# Patient Record
Sex: MALE | Race: WHITE | Employment: FULL TIME | URBAN - METROPOLITAN AREA
[De-identification: names, ages, dates, MRNs, and addresses within clinical notes are randomized per-mention and may not be internally consistent; named-entity substitution may affect disease eponyms.]

---

## 2019-01-15 ENCOUNTER — TELEPHONE (OUTPATIENT)
Dept: CARDIOLOGY CLINIC | Age: 59
End: 2019-01-15

## 2019-01-15 ENCOUNTER — OFFICE VISIT (OUTPATIENT)
Dept: CARDIOLOGY CLINIC | Age: 59
End: 2019-01-15
Payer: COMMERCIAL

## 2019-01-15 VITALS — OXYGEN SATURATION: 96 % | HEART RATE: 60 BPM | DIASTOLIC BLOOD PRESSURE: 74 MMHG | SYSTOLIC BLOOD PRESSURE: 112 MMHG

## 2019-01-15 DIAGNOSIS — R07.9 CHEST PAIN, UNSPECIFIED TYPE: Primary | ICD-10-CM

## 2019-01-15 DIAGNOSIS — R06.02 SHORTNESS OF BREATH: ICD-10-CM

## 2019-01-15 PROCEDURE — 93000 ELECTROCARDIOGRAM COMPLETE: CPT | Performed by: INTERNAL MEDICINE

## 2019-01-15 PROCEDURE — G8421 BMI NOT CALCULATED: HCPCS | Performed by: INTERNAL MEDICINE

## 2019-01-15 PROCEDURE — G8427 DOCREV CUR MEDS BY ELIG CLIN: HCPCS | Performed by: INTERNAL MEDICINE

## 2019-01-15 PROCEDURE — G8484 FLU IMMUNIZE NO ADMIN: HCPCS | Performed by: INTERNAL MEDICINE

## 2019-01-15 PROCEDURE — 3017F COLORECTAL CA SCREEN DOC REV: CPT | Performed by: INTERNAL MEDICINE

## 2019-01-15 PROCEDURE — 99245 OFF/OP CONSLTJ NEW/EST HI 55: CPT | Performed by: INTERNAL MEDICINE

## 2019-01-15 RX ORDER — ATORVASTATIN CALCIUM 40 MG/1
40 TABLET, FILM COATED ORAL DAILY
Qty: 90 TABLET | Refills: 3 | Status: SHIPPED | OUTPATIENT
Start: 2019-01-15 | End: 2019-02-11 | Stop reason: SDUPTHER

## 2019-01-15 RX ORDER — METOPROLOL SUCCINATE 25 MG/1
25 TABLET, EXTENDED RELEASE ORAL DAILY
Qty: 90 TABLET | Refills: 3 | Status: SHIPPED | OUTPATIENT
Start: 2019-01-15 | End: 2020-01-27

## 2019-01-15 RX ORDER — ASPIRIN 81 MG/1
81 TABLET ORAL DAILY
Qty: 90 TABLET | Refills: 3 | Status: SHIPPED | OUTPATIENT
Start: 2019-01-15 | End: 2020-01-27

## 2019-01-15 RX ORDER — ATORVASTATIN CALCIUM 20 MG/1
40 TABLET, FILM COATED ORAL DAILY
Qty: 90 TABLET | Refills: 3 | Status: ON HOLD | OUTPATIENT
Start: 2019-01-15 | End: 2019-01-17 | Stop reason: HOSPADM

## 2019-01-15 RX ORDER — ATORVASTATIN CALCIUM 20 MG/1
TABLET, FILM COATED ORAL
Refills: 1 | COMMUNITY
Start: 2018-12-10 | End: 2019-01-15 | Stop reason: SDUPTHER

## 2019-01-15 RX ORDER — GLIMEPIRIDE 4 MG/1
TABLET ORAL DAILY
Refills: 1 | COMMUNITY
Start: 2019-01-08

## 2019-01-15 RX ORDER — LISINOPRIL AND HYDROCHLOROTHIAZIDE 20; 12.5 MG/1; MG/1
TABLET ORAL
Refills: 1 | COMMUNITY
Start: 2019-01-08 | End: 2022-10-19 | Stop reason: ALTCHOICE

## 2019-01-15 RX ORDER — NITROGLYCERIN 0.4 MG/1
0.4 TABLET SUBLINGUAL EVERY 5 MIN PRN
Qty: 25 TABLET | Refills: 3 | Status: SHIPPED | OUTPATIENT
Start: 2019-01-15 | End: 2022-07-28

## 2019-01-17 ENCOUNTER — HOSPITAL ENCOUNTER (OUTPATIENT)
Dept: CARDIAC CATH/INVASIVE PROCEDURES | Age: 59
Discharge: HOME | End: 2019-01-17
Attending: INTERNAL MEDICINE | Admitting: INTERNAL MEDICINE
Payer: COMMERCIAL

## 2019-01-17 VITALS — WEIGHT: 220 LBS | HEIGHT: 72 IN | BODY MASS INDEX: 29.8 KG/M2

## 2019-01-17 DIAGNOSIS — R06.09 DYSPNEA ON EXERTION: ICD-10-CM

## 2019-01-17 DIAGNOSIS — R07.9 CHEST PAIN, UNSPECIFIED TYPE: Primary | ICD-10-CM

## 2019-01-17 DIAGNOSIS — E78.2 MIXED HYPERLIPIDEMIA: ICD-10-CM

## 2019-01-17 LAB
A/G RATIO: 1.7 (ref 1.1–2.2)
ALBUMIN SERPL-MCNC: 4.7 G/DL (ref 3.4–5)
ALP BLD-CCNC: 58 U/L (ref 40–129)
ALT SERPL-CCNC: 21 U/L (ref 10–40)
ANION GAP SERPL CALCULATED.3IONS-SCNC: 10 MMOL/L (ref 3–16)
AST SERPL-CCNC: 17 U/L (ref 15–37)
BILIRUB SERPL-MCNC: 0.6 MG/DL (ref 0–1)
BUN BLDV-MCNC: 21 MG/DL (ref 7–20)
CALCIUM SERPL-MCNC: 9.6 MG/DL (ref 8.3–10.6)
CHLORIDE BLD-SCNC: 100 MMOL/L (ref 99–110)
CHOLESTEROL, TOTAL: 119 MG/DL (ref 0–199)
CO2: 27 MMOL/L (ref 21–32)
CREAT SERPL-MCNC: 1 MG/DL (ref 0.9–1.3)
GFR AFRICAN AMERICAN: >60
GFR NON-AFRICAN AMERICAN: >60
GLOBULIN: 2.7 G/DL
GLUCOSE BLD-MCNC: 119 MG/DL (ref 70–99)
HCT VFR BLD CALC: 41.5 % (ref 40.5–52.5)
HDLC SERPL-MCNC: 31 MG/DL (ref 40–60)
HEMOGLOBIN: 13.7 G/DL (ref 13.5–17.5)
LDL CHOLESTEROL CALCULATED: 71 MG/DL
LEFT VENTRICULAR EJECTION FRACTION HIGH VALUE: 60 %
LEFT VENTRICULAR EJECTION FRACTION MODE: NORMAL
LV EF: 55 %
MCH RBC QN AUTO: 28.7 PG (ref 26–34)
MCHC RBC AUTO-ENTMCNC: 32.9 G/DL (ref 31–36)
MCV RBC AUTO: 87 FL (ref 80–100)
PDW BLD-RTO: 13.6 % (ref 12.4–15.4)
PLATELET # BLD: 212 K/UL (ref 135–450)
PMV BLD AUTO: 8.7 FL (ref 5–10.5)
POTASSIUM SERPL-SCNC: 4.4 MMOL/L (ref 3.5–5.1)
RBC # BLD: 4.77 M/UL (ref 4.2–5.9)
SODIUM BLD-SCNC: 137 MMOL/L (ref 136–145)
TOTAL PROTEIN: 7.4 G/DL (ref 6.4–8.2)
TRIGL SERPL-MCNC: 84 MG/DL (ref 0–150)
TSH REFLEX: 1.32 UIU/ML (ref 0.27–4.2)
VLDLC SERPL CALC-MCNC: 17 MG/DL
WBC # BLD: 5.3 K/UL (ref 4–11)

## 2019-01-17 PROCEDURE — 6360000004 HC RX CONTRAST MEDICATION: Performed by: INTERNAL MEDICINE

## 2019-01-17 PROCEDURE — 84443 ASSAY THYROID STIM HORMONE: CPT

## 2019-01-17 PROCEDURE — 80061 LIPID PANEL: CPT

## 2019-01-17 PROCEDURE — 93005 ELECTROCARDIOGRAM TRACING: CPT | Performed by: INTERNAL MEDICINE

## 2019-01-17 PROCEDURE — 93458 L HRT ARTERY/VENTRICLE ANGIO: CPT

## 2019-01-17 PROCEDURE — 2709999900 HC NON-CHARGEABLE SUPPLY

## 2019-01-17 PROCEDURE — C1894 INTRO/SHEATH, NON-LASER: HCPCS

## 2019-01-17 PROCEDURE — 99152 MOD SED SAME PHYS/QHP 5/>YRS: CPT

## 2019-01-17 PROCEDURE — C1760 CLOSURE DEV, VASC: HCPCS

## 2019-01-17 PROCEDURE — 99152 MOD SED SAME PHYS/QHP 5/>YRS: CPT | Performed by: INTERNAL MEDICINE

## 2019-01-17 PROCEDURE — 6360000002 HC RX W HCPCS

## 2019-01-17 PROCEDURE — 36415 COLL VENOUS BLD VENIPUNCTURE: CPT

## 2019-01-17 PROCEDURE — 80053 COMPREHEN METABOLIC PANEL: CPT

## 2019-01-17 PROCEDURE — 2500000003 HC RX 250 WO HCPCS

## 2019-01-17 PROCEDURE — 83036 HEMOGLOBIN GLYCOSYLATED A1C: CPT

## 2019-01-17 PROCEDURE — C1769 GUIDE WIRE: HCPCS

## 2019-01-17 PROCEDURE — 93458 L HRT ARTERY/VENTRICLE ANGIO: CPT | Performed by: INTERNAL MEDICINE

## 2019-01-17 PROCEDURE — 85027 COMPLETE CBC AUTOMATED: CPT

## 2019-01-17 PROCEDURE — 99024 POSTOP FOLLOW-UP VISIT: CPT | Performed by: INTERNAL MEDICINE

## 2019-01-17 PROCEDURE — 99153 MOD SED SAME PHYS/QHP EA: CPT

## 2019-01-17 RX ADMIN — IOPAMIDOL 88 ML: 755 INJECTION, SOLUTION INTRAVENOUS at 13:50

## 2019-01-18 LAB
EKG ATRIAL RATE: 72 BPM
EKG DIAGNOSIS: NORMAL
EKG P AXIS: 54 DEGREES
EKG P-R INTERVAL: 126 MS
EKG Q-T INTERVAL: 390 MS
EKG QRS DURATION: 96 MS
EKG QTC CALCULATION (BAZETT): 427 MS
EKG R AXIS: 81 DEGREES
EKG T AXIS: 10 DEGREES
EKG VENTRICULAR RATE: 72 BPM
ESTIMATED AVERAGE GLUCOSE: 197.3 MG/DL
HBA1C MFR BLD: 8.5 %

## 2019-02-05 ENCOUNTER — HOSPITAL ENCOUNTER (OUTPATIENT)
Dept: NON INVASIVE DIAGNOSTICS | Age: 59
Discharge: HOME | End: 2019-02-05
Payer: COMMERCIAL

## 2019-02-05 DIAGNOSIS — R06.09 DYSPNEA ON EXERTION: ICD-10-CM

## 2019-02-05 LAB
LEFT VENTRICULAR EJECTION FRACTION HIGH VALUE: 60 %
LEFT VENTRICULAR EJECTION FRACTION MODE: NORMAL
LV EF: 55 %
LV EF: 58 %
LVEF MODALITY: NORMAL

## 2019-02-05 PROCEDURE — 93306 TTE W/DOPPLER COMPLETE: CPT

## 2019-02-11 RX ORDER — ATORVASTATIN CALCIUM 40 MG/1
40 TABLET, FILM COATED ORAL DAILY
Qty: 90 TABLET | Refills: 3 | Status: SHIPPED | OUTPATIENT
Start: 2019-02-11 | End: 2022-07-28

## 2019-03-08 ENCOUNTER — HOSPITAL ENCOUNTER (OUTPATIENT)
Age: 59
Discharge: HOME | End: 2019-03-08
Payer: COMMERCIAL

## 2019-03-08 DIAGNOSIS — E78.2 MIXED HYPERLIPIDEMIA: ICD-10-CM

## 2019-03-08 PROBLEM — E78.5 HYPERLIPIDEMIA: Status: ACTIVE | Noted: 2019-03-08

## 2019-03-08 PROBLEM — Z82.49 FAMILY HISTORY OF CARDIOVASCULAR DISEASE: Status: ACTIVE | Noted: 2019-03-08

## 2019-03-08 LAB
ALBUMIN SERPL-MCNC: 4.6 G/DL (ref 3.4–5)
ALP BLD-CCNC: 59 U/L (ref 40–129)
ALT SERPL-CCNC: 24 U/L (ref 10–40)
AST SERPL-CCNC: 20 U/L (ref 15–37)
BILIRUB SERPL-MCNC: 0.4 MG/DL (ref 0–1)
BILIRUBIN DIRECT: <0.2 MG/DL (ref 0–0.3)
BILIRUBIN, INDIRECT: NORMAL MG/DL (ref 0–1)
CHOLESTEROL, TOTAL: 117 MG/DL (ref 0–199)
HDLC SERPL-MCNC: 34 MG/DL (ref 40–60)
LDL CHOLESTEROL CALCULATED: 67 MG/DL
TOTAL PROTEIN: 7.3 G/DL (ref 6.4–8.2)
TRIGL SERPL-MCNC: 80 MG/DL (ref 0–150)
VLDLC SERPL CALC-MCNC: 16 MG/DL

## 2019-03-08 PROCEDURE — 36415 COLL VENOUS BLD VENIPUNCTURE: CPT

## 2019-03-08 PROCEDURE — 80076 HEPATIC FUNCTION PANEL: CPT

## 2019-03-08 PROCEDURE — 80061 LIPID PANEL: CPT

## 2019-03-12 ENCOUNTER — OFFICE VISIT (OUTPATIENT)
Dept: CARDIOLOGY CLINIC | Age: 59
End: 2019-03-12
Payer: COMMERCIAL

## 2019-03-12 VITALS
WEIGHT: 223.4 LBS | BODY MASS INDEX: 30.26 KG/M2 | OXYGEN SATURATION: 99 % | HEART RATE: 62 BPM | SYSTOLIC BLOOD PRESSURE: 116 MMHG | DIASTOLIC BLOOD PRESSURE: 72 MMHG | HEIGHT: 72 IN

## 2019-03-12 DIAGNOSIS — Z82.49 FAMILY HISTORY OF CARDIOVASCULAR DISEASE: ICD-10-CM

## 2019-03-12 DIAGNOSIS — E78.5 HYPERLIPIDEMIA, UNSPECIFIED HYPERLIPIDEMIA TYPE: Primary | ICD-10-CM

## 2019-03-12 PROCEDURE — G8417 CALC BMI ABV UP PARAM F/U: HCPCS | Performed by: INTERNAL MEDICINE

## 2019-03-12 PROCEDURE — G8484 FLU IMMUNIZE NO ADMIN: HCPCS | Performed by: INTERNAL MEDICINE

## 2019-03-12 PROCEDURE — G8427 DOCREV CUR MEDS BY ELIG CLIN: HCPCS | Performed by: INTERNAL MEDICINE

## 2019-03-12 PROCEDURE — 99214 OFFICE O/P EST MOD 30 MIN: CPT | Performed by: INTERNAL MEDICINE

## 2019-03-12 PROCEDURE — 4004F PT TOBACCO SCREEN RCVD TLK: CPT | Performed by: INTERNAL MEDICINE

## 2019-03-12 PROCEDURE — 3017F COLORECTAL CA SCREEN DOC REV: CPT | Performed by: INTERNAL MEDICINE

## 2019-03-12 PROCEDURE — G8598 ASA/ANTIPLAT THER USED: HCPCS | Performed by: INTERNAL MEDICINE

## 2020-01-27 RX ORDER — ASPIRIN 81 MG/1
TABLET, DELAYED RELEASE ORAL
Qty: 30 TABLET | Refills: 2 | Status: SHIPPED | OUTPATIENT
Start: 2020-01-27 | End: 2020-01-30

## 2020-01-27 RX ORDER — METOPROLOL SUCCINATE 25 MG/1
25 TABLET, EXTENDED RELEASE ORAL DAILY
Qty: 30 TABLET | Refills: 2 | Status: SHIPPED | OUTPATIENT
Start: 2020-01-27 | End: 2020-01-30

## 2020-01-27 NOTE — TELEPHONE ENCOUNTER
RX APPROVAL:      Refill:   Requested Prescriptions     Pending Prescriptions Disp Refills    metoprolol succinate (TOPROL XL) 25 MG extended release tablet [Pharmacy Med Name: Metoprolol Succinate ER Oral Tablet Extended Release 24 Hour 25 MG] 30 tablet 2     Sig: TAKE 1 TABLET BY MOUTH DAILY    SM ASPIRIN ADULT LOW STRENGTH 81 MG EC tablet [Pharmacy Med Name: SM Aspirin Adult Low Strength Oral Tablet Delayed Release 81 MG] 30 tablet 2     Sig: TAKE 1 TABLET BY MOUTH ONE TIME A DAY      Last OV: 3/12/2019 next ov 4/13/20  Last EKG:   Last Labs:   Lab Results   Component Value Date    GLUCOSE 119 01/17/2019    BUN 21 01/17/2019    CREATININE 1.0 01/17/2019    LABGLOM >60 01/17/2019     01/17/2019    K 4.4 01/17/2019     01/17/2019    CO2 27 01/17/2019    CALCIUM 9.6 01/17/2019     Lab Results   Component Value Date     01/17/2019     01/17/2019    CO2 27 01/17/2019    ANIONGAP 10 01/17/2019    GLUCOSE 119 01/17/2019    BUN 21 01/17/2019    CREATININE 1.0 01/17/2019    LABGLOM >60 01/17/2019    GFRAA >60 01/17/2019    CALCIUM 9.6 01/17/2019    PROT 7.3 03/08/2019    LABALBU 4.6 03/08/2019    BILITOT 0.4 03/08/2019    ALKPHOS 59 03/08/2019    AST 20 03/08/2019    ALT 24 03/08/2019    GLOB 2.7 01/17/2019     Lab Results   Component Value Date    ALT 24 03/08/2019    AST 20 03/08/2019     Lab Results   Component Value Date    K 4.4 01/17/2019       Plan and labs reviewed

## 2020-01-30 RX ORDER — ASPIRIN 81 MG/1
TABLET, DELAYED RELEASE ORAL
Qty: 30 TABLET | Refills: 2 | Status: SHIPPED | OUTPATIENT
Start: 2020-01-30 | End: 2020-08-24

## 2020-01-30 RX ORDER — METOPROLOL SUCCINATE 25 MG/1
25 TABLET, EXTENDED RELEASE ORAL DAILY
Qty: 30 TABLET | Refills: 2 | Status: SHIPPED | OUTPATIENT
Start: 2020-01-30 | End: 2020-07-27

## 2020-04-03 ENCOUNTER — TELEPHONE (OUTPATIENT)
Dept: CARDIOLOGY CLINIC | Age: 60
End: 2020-04-03

## 2020-04-03 NOTE — TELEPHONE ENCOUNTER
Called Judi Cruz to discuss appointment with [facility] 4/13/20. Offered to change to virtual visit, but he would prefer to cancel for now and call back to reschedule after COVID precaution recommendations lifted. Denies cardiac symptoms. In fact he states \"he feels great. \" Appointment cancelled.

## 2020-07-27 ENCOUNTER — TELEPHONE (OUTPATIENT)
Dept: CARDIOLOGY CLINIC | Age: 60
End: 2020-07-27

## 2020-07-27 NOTE — TELEPHONE ENCOUNTER
Due for yearly follow up with [facility], please call and schedule next available.

## 2020-08-24 RX ORDER — ASPIRIN 81 MG/1
TABLET, DELAYED RELEASE ORAL
Qty: 30 TABLET | Refills: 0 | Status: SHIPPED | OUTPATIENT
Start: 2020-08-24 | End: 2020-09-28

## 2020-09-28 RX ORDER — ASPIRIN 81 MG/1
TABLET, DELAYED RELEASE ORAL
Qty: 30 TABLET | Refills: 0 | Status: SHIPPED | OUTPATIENT
Start: 2020-09-28 | End: 2020-11-25

## 2020-11-23 RX ORDER — ASPIRIN 81 MG/1
TABLET, DELAYED RELEASE ORAL
Qty: 30 TABLET | Refills: 0 | OUTPATIENT
Start: 2020-11-23

## 2020-11-25 RX ORDER — ASPIRIN 81 MG/1
TABLET, DELAYED RELEASE ORAL
Qty: 30 TABLET | Refills: 0 | Status: SHIPPED | OUTPATIENT
Start: 2020-11-25 | End: 2022-07-28

## 2020-11-25 NOTE — TELEPHONE ENCOUNTER
Lov 3/12/2019  Labs 3/8/2019  Lrf  9/28/2020 Disp 30+0  Appt No appt scheduled please advise thanks.

## 2021-02-23 DIAGNOSIS — R07.9 CHEST PAIN, UNSPECIFIED TYPE: ICD-10-CM

## 2021-02-23 DIAGNOSIS — R06.02 SHORTNESS OF BREATH: ICD-10-CM

## 2021-02-23 RX ORDER — ASPIRIN 81 MG/1
TABLET, DELAYED RELEASE ORAL
Qty: 30 TABLET | Refills: 0 | OUTPATIENT
Start: 2021-02-23

## 2021-07-23 DIAGNOSIS — R06.02 SHORTNESS OF BREATH: ICD-10-CM

## 2021-07-23 DIAGNOSIS — R07.9 CHEST PAIN, UNSPECIFIED TYPE: ICD-10-CM

## 2021-07-23 RX ORDER — METOPROLOL SUCCINATE 25 MG/1
TABLET, EXTENDED RELEASE ORAL
Qty: 90 TABLET | Refills: 0 | Status: SHIPPED | OUTPATIENT
Start: 2021-07-23 | End: 2021-10-25

## 2021-07-23 NOTE — TELEPHONE ENCOUNTER
Received refill request for Metoprolol succinate from [facility].      Last ov:2019 BNN    Last EK2019    Last Refill:2020 #90 tabs w/ 3 refills    Next appointment:no future appointment last six months

## 2021-10-23 DIAGNOSIS — R06.02 SHORTNESS OF BREATH: ICD-10-CM

## 2021-10-23 DIAGNOSIS — R07.9 CHEST PAIN, UNSPECIFIED TYPE: ICD-10-CM

## 2021-10-25 RX ORDER — METOPROLOL SUCCINATE 25 MG/1
TABLET, EXTENDED RELEASE ORAL
Qty: 90 TABLET | Refills: 0 | Status: SHIPPED | OUTPATIENT
Start: 2021-10-25 | End: 2022-07-28

## 2021-10-25 NOTE — TELEPHONE ENCOUNTER
Received refill request for Metoprolol succinate from [facility].     Last ov: 2019 BNN    Last EK2019    Last Refill: 2021 #90 w/ 0 refills    Next appointment: no future OV

## 2022-01-23 DIAGNOSIS — R06.02 SHORTNESS OF BREATH: ICD-10-CM

## 2022-01-23 DIAGNOSIS — R07.9 CHEST PAIN, UNSPECIFIED TYPE: ICD-10-CM

## 2022-01-24 RX ORDER — METOPROLOL SUCCINATE 25 MG/1
TABLET, EXTENDED RELEASE ORAL
Qty: 90 TABLET | Refills: 0 | OUTPATIENT
Start: 2022-01-24

## 2022-01-24 NOTE — TELEPHONE ENCOUNTER
Received refill request for Metoprolol from [facility]. Last OV: 03/12/2019 w/ BNN     Last Refill: 10/25/2021 #90 w/ 0 refills     Next Appointment: none at this time     Spoke with the patient and advised him that he needs an appt as we have not seen him since 2019. Patient states he doesn't know if he needs to take it anymore but he is going to f/u with his PCP .      Refusing rx at this time

## 2022-02-22 DIAGNOSIS — R07.9 CHEST PAIN, UNSPECIFIED TYPE: ICD-10-CM

## 2022-02-22 DIAGNOSIS — R06.02 SHORTNESS OF BREATH: ICD-10-CM

## 2022-02-22 RX ORDER — METOPROLOL SUCCINATE 25 MG/1
TABLET, EXTENDED RELEASE ORAL
Qty: 90 TABLET | Refills: 0 | OUTPATIENT
Start: 2022-02-22

## 2022-02-22 NOTE — TELEPHONE ENCOUNTER
I spoke with pt and told him, he needs an appointment or PCP can fill his med. He stated  He will contact PCP.     Refill refused

## 2022-07-26 PROBLEM — E11.59 HYPERTENSION ASSOCIATED WITH TYPE 2 DIABETES MELLITUS (HCC): Status: ACTIVE | Noted: 2022-07-26

## 2022-07-26 PROBLEM — I15.2 HYPERTENSION ASSOCIATED WITH TYPE 2 DIABETES MELLITUS (HCC): Status: ACTIVE | Noted: 2022-07-26

## 2022-07-26 PROBLEM — E11.69 TYPE 2 DIABETES MELLITUS WITH OBESITY (HCC): Status: ACTIVE | Noted: 2022-07-26

## 2022-07-26 PROBLEM — E66.9 TYPE 2 DIABETES MELLITUS WITH OBESITY (HCC): Status: ACTIVE | Noted: 2022-07-26

## 2022-07-26 PROBLEM — E11.59 CORONARY ARTERY DISEASE DUE TO TYPE 2 DIABETES MELLITUS (HCC): Status: ACTIVE | Noted: 2022-07-26

## 2022-07-26 PROBLEM — I25.10 CORONARY ARTERY DISEASE DUE TO TYPE 2 DIABETES MELLITUS (HCC): Status: ACTIVE | Noted: 2022-07-26

## 2022-07-26 PROBLEM — Z79.899 MEDICAL CANNABIS USE: Status: ACTIVE | Noted: 2022-07-26

## 2022-07-27 NOTE — PROGRESS NOTES
Coretta Lloyd   58 y.o. male   1960    This is patient's first visit with me. Coretta Lloyd is here to establish care as their new PCP. Coretta Lloyd has a PMH significant for:    Patient Active Problem List   Diagnosis    Chest pain    Shortness of breath    Unstable angina (HCC)    Family history of cardiovascular disease    Hyperlipidemia    Hypertension associated with type 2 diabetes mellitus ([facility] [address]    Coronary artery disease due to type 2 diabetes mellitus ([facility] [address]    Medical cannabis use       Reason(s) for visit:   Chief Complaint   Patient presents with    Established New Doctor       HPI:    Office visit encounter:    CAD:  -Echo Feb 2019 - EF 55-60%. Normal LV. -LHC - Jan 2019 - LAD 25-30%. -Patient stated he quit taking metoprolol because he didn't like taking meds. HTN:  BP Readings from Last 3 Encounters:   07/28/22 130/72   03/12/19 116/72   01/15/19 112/74     -Patient has not been checking BP readings regularly.   -Patient denied issues with frequent headache, chest pain, shortness of breath, palpitations, malaise, etc.  -He did note having issues of fatigue and diaphoresis easily. Type II diabetes mellitus:  -Last A1c =  6.0-6.9 - earlier this year    Lab Results   Component Value Date    LABA1C 8.5 01/17/2019     Lab Results   Component Value Date    LABMICR Not Indicated 05/26/2015    LDLCALC 67 03/08/2019    CREATININE 1.0 01/17/2019     -Glucose readings (on average): usually checks before dinner - 110-160  -Neuropathy symptoms: [] Yes [x] No  -Gastroparesis symptoms: [] Yes [x] No   -Visual disturbances: [x] Yes [] No  -Eye exam:   -Last one - [facility] 2020 - normal  [x] Wears glasses/contact lenses   -Medication adherence: [x] Yes [] No  -Other comments:     Colon cancer screening:    Other:  -Arthrits issues - repair of left hip, right knee. Will have rt hip surgery in Aug 2022. 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     PDMP monitoring:  -PDMP report was remarkable for:  [] Narcotics:  [] Benzodiazepines:  [] Stimulants:  [] Sedatives:  [] Neuropathic meds:  [x] Medical cannabis: monthly rx since as early as April 2022. [] Other:  -Last report:   Last PDMP [place] as Reviewed [facility]):  Review User Review Instant Review Result   [address] Willams, JOAQUÍN 7/26/2022 11:23 PM Reviewed PDMP [1]       No Known Allergies    Current Outpatient Medications on File Prior to Visit   Medication Sig Dispense Refill    atorvastatin (LIPITOR) 20 MG tablet TAKE 1 TABLET BY MOUTH AT BEDTIME      metFORMIN (GLUCOPHAGE) 1000 MG tablet Take 1,000 mg by mouth daily (with breakfast)  1    glimepiride (AMARYL) 4 MG tablet daily  1    lisinopril-hydrochlorothiazide (PRINZIDE;ZESTORETIC) 20-12.5 MG per tablet TAKE 1 TABLET BY MOUTH ONE TIME A DAY  1     No current facility-administered medications on file prior to visit.         Family History   Problem Relation Age of Onset    Cancer Mother     Diabetes Father     Kidney Disease Father     Heart Attack Father     Heart Attack Brother        Social History     Tobacco Use    Smoking status: Never    Smokeless tobacco: Current     Types: Snuff   Substance Use Topics    Alcohol use: Yes        Lab Results   Component Value Date    WBC 5.3 01/17/2019    HGB 13.7 01/17/2019    HCT 41.5 01/17/2019    MCV 87.0 01/17/2019     01/17/2019         Chemistry        Component Value Date/Time     01/17/2019 1140    K 4.4 01/17/2019 1140     01/17/2019 1140    CO2 27 01/17/2019 1140    BUN 21 (H) 01/17/2019 1140    CREATININE 1.0 01/17/2019 1140        Component Value Date/Time    CALCIUM 9.6 01/17/2019 1140    ALKPHOS 59 03/08/2019 0750    AST 20 03/08/2019 0750    ALT 24 03/08/2019 0750    BILITOT 0.4 03/08/2019 0750          Lab Results   Component Value Date    ALT 24 03/08/2019    AST 20 03/08/2019    ALKPHOS 59 03/08/2019    BILITOT 0.4 03/08/2019       Review of Systems   Constitutional:  Positive for activity change. Negative for appetite change, fatigue, fever and unexpected weight change. HENT:  Negative for congestion, rhinorrhea, sinus pressure and trouble swallowing. Respiratory:  Negative for cough, chest tightness, shortness of breath and wheezing. Cardiovascular:  Negative for chest pain, palpitations and leg swelling. Gastrointestinal:  Negative for abdominal distention, abdominal pain, blood in stool, constipation, diarrhea, nausea and vomiting. Genitourinary:  Negative for dysuria, frequency and hematuria. Musculoskeletal:  Positive for arthralgias. Negative for back pain. Skin:  Negative for rash. Neurological:  Negative for dizziness, weakness, light-headedness, numbness and headaches. Wt Readings from Last 3 Encounters:   07/28/22 206 lb 11.2 oz (93.8 kg)   03/12/19 223 lb 6.4 oz (101.3 kg)   01/17/19 220 lb (99.8 kg)       BP Readings from Last 3 Encounters:   07/28/22 130/72   03/12/19 116/72   01/15/19 112/74       Pulse Readings from Last 3 Encounters:   07/28/22 81   03/12/19 62   01/15/19 60       /72   Pulse 81   Temp 97.4 °F (36.3 °C)   Ht 6' (1.829 m)   Wt 206 lb 11.2 oz (93.8 kg)   SpO2 97%   BMI 28.03 kg/m²      Physical Exam  Vitals reviewed. Constitutional:       General: He is awake. He is not in acute distress. Appearance: He is overweight. He is not ill-appearing or diaphoretic. HENT:      Head: Normocephalic and atraumatic. No abrasion or masses. Hair is normal.      Right Ear: External ear normal.      Left Ear: External ear normal.      Nose: Nose normal.   Eyes:      General: Lids are normal. Gaze aligned appropriately. No scleral icterus. Right eye: No discharge. Left eye: No discharge. Extraocular Movements: Extraocular movements intact. Conjunctiva/sclera: Conjunctivae normal.   Neck:      Trachea: Phonation normal.   Cardiovascular:      Rate and Rhythm: Normal rate and regular rhythm.    Pulmonary:      Effort: Pulmonary effort is normal. No respiratory distress. Breath sounds: No wheezing, rhonchi or rales. Abdominal:      General: Abdomen is flat. There is no distension. Palpations: Abdomen is soft. Musculoskeletal:         General: No deformity. Normal range of motion. Cervical back: Normal range of motion. No erythema. Right lower leg: No edema. Left lower leg: No edema. Skin:     Coloration: Skin is not cyanotic, jaundiced or pale. Findings: No abrasion, abscess, bruising, ecchymosis, erythema, signs of injury, laceration, lesion, petechiae, rash or wound. Neurological:      General: No focal deficit present. Mental Status: He is alert. Mental status is at baseline. GCS: GCS eye subscore is 4. GCS verbal subscore is 5. GCS motor subscore is 6. Cranial Nerves: No cranial nerve deficit, dysarthria or facial asymmetry. Motor: No weakness, tremor, atrophy or seizure activity. Coordination: Coordination normal.      Gait: Gait is intact. Psychiatric:         Attention and Perception: Attention and perception normal.         Mood and Affect: Mood and affect normal.         Speech: Speech normal.         Behavior: Behavior normal. Behavior is cooperative. Thought Content: Thought content normal.     -DM foot exam-  Visual inspection:  -Deformity/amputation: absent  -Skin lesions/pre-ulcerative calluses: absent  -Nails: hypertrophy - none; onychomycosis - none  -Edema: right- negative, left- negative    Sensory exam:  -Monofilament sensation: normal  (minimum of 5 random plantar locations tested, avoiding callused areas - > 1 area with absence of sensation is + for neuropathy)    Other:  -Pulses: 2+ dorsalis pedis, 2+ posterior tibialis - bilaterally  -Proprioception: Intact  -Skin temperature: Warm to touch. Assessment/Plan:   Delores Mcgregor was seen today for established new doctor.     Diagnoses and all orders for this visit:    Encounter to establish care with new doctor  Comments: Will continue current medical regiment and may change his meds depending on his lab work. Coronary artery disease due to type 2 diabetes mellitus (HCC)  -     C-REACTIVE PROTEIN HIGH SENSITIVITY; Future    Hypertension associated with type 2 diabetes mellitus (HCC)  -     Microalbumin / Creatinine Urine Ratio; Future  -     Brain Natriuretic Peptide; Future  -     HM DIABETES FOOT EXAM  -     Hemoglobin A1C; Future    Chronic fatigue  -     C-REACTIVE PROTEIN HIGH SENSITIVITY; Future  -     Testosterone; Future  -     Ferritin; Future  -     Iron and TIBC; Future  -     Vitamin B12 & Folate; Future  -     Vitamin D 25 Hydroxy; Future  -     TSH; Future  -     T4, Free; Future  -     Magnesium; Future  -     Sedimentation Rate; Future  -     Microalbumin / Creatinine Urine Ratio; Future  -     Brain Natriuretic Peptide; Future    Medical cannabis use     I reviewed the plan of care with the patient. Patient acknowledged understanding and agreed with plan of care overall. Medications Discontinued During This Encounter   Medication Reason    atorvastatin (LIPITOR) 40 MG tablet LIST CLEANUP    nitroGLYCERIN (NITROSTAT) 0.4 MG SL tablet LIST CLEANUP    metoprolol succinate (TOPROL XL) 25 MG extended release tablet LIST CLEANUP    SM ASPIRIN ADULT LOW STRENGTH 81 MG EC tablet LIST CLEANUP        General information on medications:  -When it comes to medications, whether with starting or adding a new medication or increasing the dose of a current medication, the benefits and risks have to always be considered and weighed over, especially if one is taking other medications as well.    -There are no medications that have no side effects and that there is always a risk involved with taking a medication.    -If a side effect were to occur with starting a new medication or with increasing the dose of a current medication that either the medication can be totally discontinued altogether or simply decrease the dose of it and if this would be the case a follow-up appointment would be deemed necessary.    -The drug allergy list will then be updated with the corresponding side effect(s) if it's deemed to be a true 'drug allergy'. -The most common adverse effects of medication(s) were addressed at today's visit.    -Lastly, the coverage status of a medication may vary from insurance to insurance and the only way to verify if the medication is covered is to send an actual prescription in.    -The drug formulary of each insurance changes without any warning or notification to the healthcare provider let alone the pharmacy.  -The cost of medications vary from insurance to insurance and the cost is always subject to change just like the drug formulary. Follow-up: No follow-ups on file. .     Patient was informed that if his or her symptoms worsen to follow up with me sooner or go to the nearest ER if the symptoms are very significant and warrant higher level of care. Regarding my note:  -This note was composed (by me only and not with assistance via a scribe) to the best of my knowledge and recollection of the encounter with the patient using one of my own customized note templates utilizing a combination of typing and dictating with the [address] [place] [id number] speech recognition software. As a result, the note may possibly contain various errors (e.g. spelling, grammar, and non-sensible words/phrases/statements) despite reviewing the note prior to signing it for completion. Time spent includes some or all of the following, both face-to-face time and non face-to-face time, but is not limited to:  [x] Preparing to see the patient by reviewing medical records available (notes, labs, imaging, etc.) prior to seeing the patient. [x] Obtaining and/or reviewing the history from the patient. [x] Performing a medically appropriate examination.   [x] Ordering of relevant lab work, medications, referrals, or procedures. [x] Discussing patient's medical issues and formulating an assessment and plan. [x] Reviewing plan of care with patient. Answering any questions or concerns. [x] Documentation within the electronic health record (EHR)  [] Reviewing records of history relevant to patient's issues after seeing the patient. [] Discussion or coordination of care with other health care professionals  [x] Other: length of office visit:  minutes    Shadi Bonner M.D.   [address] [place] [address]    Electronically signed by Tiesha Douglas MD on 7/28/2022 at 3:15 PM.

## 2022-07-27 NOTE — PROGRESS NOTES
-Preoperative Consultation-    Patient name: Clint Veliz    YOB: 1960    Date of Service: 7/29/2022    Chief Complaint   Patient presents with    Pre-op Exam     8/15 [facility]     This patient presents to the office today for a preoperative consultation    Surgery type: total right hip arthroplasty  Surgeon: Dr. Bharat Leonardo   Date of operation: August 15, 2022. Location: [facility]. History: Patient stated that he has had about 6 month history of right hip pain. He has a history of total left hip arthroplasty. He also has had a total right knee replacement. Conservative therapy includes: analgesics and self PT - no relief.     Planned anesthesia: to be determined by CRNA/anesthesiologist   Known anesthesia problems: None   Bleeding risk: No recent or remote history of abnormal bleeding  Personal or FH of DVT/PE: No    Patient objection to receiving blood products: No    Patient Active Problem List   Diagnosis    Chest pain    Shortness of breath    Unstable angina (HCC)    Family history of cardiovascular disease    Hyperlipidemia    Hypertension associated with type 2 diabetes mellitus ([facility]    Coronary artery disease due to type 2 diabetes mellitus ([facility]    Medical cannabis use     Past Surgical History:   Procedure Laterality Date    JOINT REPLACEMENT Right     knee    JOINT REPLACEMENT Left     HIP     Family History   Problem Relation Age of Onset    Cancer Mother     Diabetes Father     Kidney Disease Father     Heart Attack Father     Heart Attack Brother      No Known Allergies  Current Outpatient Medications   Medication Sig Dispense Refill    atorvastatin (LIPITOR) 20 MG tablet TAKE 1 TABLET BY MOUTH AT BEDTIME      metFORMIN (GLUCOPHAGE) 1000 MG tablet Take 1,000 mg by mouth daily (with breakfast)  1    glimepiride (AMARYL) 4 MG tablet daily  1    lisinopril-hydrochlorothiazide (PRINZIDE;ZESTORETIC) 20-12.5 MG per tablet TAKE 1 TABLET BY MOUTH ONE TIME A DAY  1     No current facility-administered medications for this visit. Social History     Socioeconomic History    Marital status:      Spouse name: Not on file    Number of children: Not on file    Years of education: Not on file    Highest education level: Not on file   Occupational History    Not on file   Tobacco Use    Smoking status: Never    Smokeless tobacco: Current     Types: Snuff   Vaping Use    Vaping Use: Never used   Substance and Sexual Activity    Alcohol use: Yes    Drug use: Never    Sexual activity: Not on file   Other Topics Concern    Not on file   Social History Narrative    Not on file     Social Determinants of Health     Financial Resource Strain: Not on file   Food Insecurity: Not on file   Transportation Needs: Not on file   Physical Activity: Not on file   Stress: Not on file   Social Connections: Not on file   Intimate Partner Violence: Not on file   Housing Stability: Not on file     Review of Systems:  A comprehensive review of systems was negative except for what was noted in the HPI. Physical Exam:   Wt Readings from Last 3 Encounters:   07/29/22 210 lb (95.3 kg)   07/28/22 206 lb 11.2 oz (93.8 kg)   03/12/19 223 lb 6.4 oz (101.3 kg)     Temp Readings from Last 3 Encounters:   07/29/22 97.2 °F (36.2 °C)   07/28/22 97.4 °F (36.3 °C)     BP Readings from Last 3 Encounters:   07/29/22 136/64   07/28/22 130/72   03/12/19 116/72     Pulse Readings from Last 3 Encounters:   07/29/22 (!) 42   07/28/22 81   03/12/19 62        Constitutional: He is oriented to person, place, and time. He appears well-developed and well-nourished. No distress. Head: Normocephalic and atraumatic. Mouth/Throat: Uvula is midline, oropharynx is clear and moist and mucous membranes are normal.   Eyes: Conjunctivae and EOM are normal. Pupils are equal, round, and reactive to light. Neck: Trachea normal and normal range of motion. Neck supple. No JVD present. Carotid bruit is not present.  No mass and no thyromegaly present. Cardiovascular: Normal rate, regular rhythm, normal heart sounds and intact distal pulses. Exam reveals no gallop and no friction rub. No murmur heard. Pulmonary/Chest: Effort normal and breath sounds normal. No respiratory distress. He has no wheezes. He has no rales. Abdominal: Soft. Normal aorta and bowel sounds are normal. He exhibits no distension and no mass. There is no hepatosplenomegaly. No tenderness. Musculoskeletal: He exhibits no edema and no tenderness. Neurological: He is alert and oriented to person, place, and time. He has normal strength. No cranial nerve deficit or sensory deficit. Coordination and gait normal.   Skin: Skin is warm and dry. No rash noted. No erythema. Psychiatric: He has a normal mood and affect. His behavior is normal.    EKG Interpretation:  frequent PVC's noted, unchanged from previous tracings. No EKG's on file for comparison. Lab Review:   Lab Results   Component Value Date/Time     01/17/2019 11:40 AM    K 4.4 01/17/2019 11:40 AM    CO2 27 01/17/2019 11:40 AM    BUN 21 01/17/2019 11:40 AM    CREATININE 1.0 01/17/2019 11:40 AM    GLUCOSE 119 01/17/2019 11:40 AM    CALCIUM 9.6 01/17/2019 11:40 AM     Lab Results   Component Value Date/Time    WBC 5.3 01/17/2019 11:40 AM    HGB 13.7 01/17/2019 11:40 AM    HCT 41.5 01/17/2019 11:40 AM    MCV 87.0 01/17/2019 11:40 AM     01/17/2019 11:40 AM         Assessment:   Maricruz Ramos is a 58 y.o. patient with planned surgery as noted above.       Known risk factors for perioperative complications: Coronary artery disease, Hypertension    Current medications which may produce withdrawal symptoms if withheld perioperatively: none     Pre-Operative Risk assessment using 2014 ACC/AHA guidelines:   -Emergent procedure: NO  -Active Cardiac Condition: NO (decompensated HF, Arrhythmia, MI <3 weeks, severe valve disease)  -Risk Level of Procedure: Intermediate Risk (intraperitoneal, intrathoracic, HENT, orthopedic, or carotid endarterectomy, etc.)  -Revised Cardiac Risk Index Risk factors: None  -Exercise Tolerance before Surgery: good        HAS-BLED Score                            Risk Factors      Points   Hypertension  Uncontrolled, >555 mmHg systolic   No=0   Renal disease  Dialysis, transplant, Cr>2.26 mg/dL or >200 µmol/L   No=0   Liver disease   Cirrhosis or bilirubin >2x normal with AST/ALT/AP >3x normal   No=0   Stroke history   No=0   Prior history of major bleeding or predisposition to bleeding     No=0   Labile INR  Unstable/high INRs, time in therapeutic range <60%   No=0   Age >71   No=0   Medication usage predisposing to bleeding   Aspirin, Clopidogrel, Prasugrel, Ticagrelor, NSAIDs, warfarin, DOACs   No=0   Alcohol use   >7 drinks/week   No=0     HAS-BLED score:    0:  Risk was 0.9% in one validation study and 1.13 bleeds per 100 patient-years in another validation study. ICD-10-CM    1. Preoperative examination  Z01.[address]  [address]      2. Essential hypertension  [address]      3. Abnormal EKG  R94.31 [facility]      4. Coronary artery disease due to type 2 diabetes mellitus [facility])  E11.59 [facility]    I25.10            Plan:   1. Preoperative workup as follows: hemoglobin, hematocrit, electrolytes, creatinine, glucose, liver function studies, COVID-19 testing (3-5 days prior to procedure - depending on surgical site recommendation). 2. Change in medication regiment before surgery: none  -On anti-platelet drug(s): none.   -On anticoagulant drug: none.   -On SGLT-2 inhibitor drug: none. There are no discontinued medications. 3. Prophylaxis for cardiac events with perioperative beta-blockers: none;  Not indicated  ACC/AHA indications for pre-operative beta-blocker use:    Vascular surgery with history of positive stress test  Intermediate or high risk surgery with history of CAD   Intermediate or high risk surgery with multiple clinical predictors of CAD- 2 of the following: history of compensated or prior heart failure, history of cerebrovascular disease, DM, or renal insufficiency  Routine administration of higher-dose, long-acting metoprolol in beta-blocker-naïve patients on the day of surgery, and in the absence of dose titration is associated with an overall increase in mortality. Beta-blockers should be started days to weeks prior to surgery and titrated to pulse < 70.    4. Deep vein thrombosis prophylaxis:   -Pharmacologic prophylaxis:  [] Eliquis - 2.5 mg twice daily for: [] at least 14 days or [] at least 35 days - to be started at least 12 hours post-op  [] Xarelto - 10 mg once daily for: [] at least 14 days or [] at least 35 days - to be started at least 12 hours post-op  [] Warfarin -   [] Other:   [x] None  -Counseled patient on ambulating as much as possible and minimizing sedentary time as well as discussed about moving lower extremities around while lying down to increase circulation. 5. Fall/safety precautions discussed (if applicable)    6. Antibiotic prophylaxis: none    7. Smoking cessation and education provided (if applicable)     8. Further recommendations from consultants: cardiology    9. Scanned pre-op form to media section and faxed to number listed on form (if applicable)    Pre-op assessment: given that patient has known CAD, HTN, and significant change in his EKG compared to his last one, I recommended referral to cardiology for evaluation. Other: I discussed with patient about avoiding NSAIDS at least 7 days prior to procedure and afterwards. Counseled patient to only use Tylenol for pain and if pain persists to schedule an appointment with me. [address]    Electronically signed by Kate Collins MD on 7/29/2022 at 10:02 AM.

## 2022-07-28 ENCOUNTER — OFFICE VISIT (OUTPATIENT)
Dept: FAMILY MEDICINE CLINIC | Age: 62
End: 2022-07-28
Payer: COMMERCIAL

## 2022-07-28 VITALS
HEIGHT: 72 IN | OXYGEN SATURATION: 97 % | HEART RATE: 81 BPM | TEMPERATURE: 97.4 F | WEIGHT: 206.7 LBS | BODY MASS INDEX: 28 KG/M2 | DIASTOLIC BLOOD PRESSURE: 72 MMHG | SYSTOLIC BLOOD PRESSURE: 130 MMHG

## 2022-07-28 DIAGNOSIS — I25.10 CORONARY ARTERY DISEASE DUE TO TYPE 2 DIABETES MELLITUS (HCC): ICD-10-CM

## 2022-07-28 DIAGNOSIS — Z79.899 MEDICAL CANNABIS USE: ICD-10-CM

## 2022-07-28 DIAGNOSIS — R53.82 CHRONIC FATIGUE: ICD-10-CM

## 2022-07-28 DIAGNOSIS — E11.59 CORONARY ARTERY DISEASE DUE TO TYPE 2 DIABETES MELLITUS (HCC): ICD-10-CM

## 2022-07-28 DIAGNOSIS — Z76.89 ENCOUNTER TO ESTABLISH CARE WITH NEW DOCTOR: Primary | ICD-10-CM

## 2022-07-28 DIAGNOSIS — E11.59 HYPERTENSION ASSOCIATED WITH TYPE 2 DIABETES MELLITUS (HCC): ICD-10-CM

## 2022-07-28 DIAGNOSIS — I15.2 HYPERTENSION ASSOCIATED WITH TYPE 2 DIABETES MELLITUS (HCC): ICD-10-CM

## 2022-07-28 PROBLEM — E11.69 TYPE 2 DIABETES MELLITUS WITH OBESITY (HCC): Status: RESOLVED | Noted: 2022-07-26 | Resolved: 2022-07-28

## 2022-07-28 PROBLEM — E66.9 TYPE 2 DIABETES MELLITUS WITH OBESITY (HCC): Status: RESOLVED | Noted: 2022-07-26 | Resolved: 2022-07-28

## 2022-07-28 PROCEDURE — 99203 OFFICE O/P NEW LOW 30 MIN: CPT | Performed by: FAMILY MEDICINE

## 2022-07-28 RX ORDER — ATORVASTATIN CALCIUM 20 MG/1
TABLET, FILM COATED ORAL
COMMUNITY
Start: 2022-06-29 | End: 2022-08-23

## 2022-07-28 ASSESSMENT — ENCOUNTER SYMPTOMS
NAUSEA: 0
SINUS PRESSURE: 0
BACK PAIN: 0
CHEST TIGHTNESS: 0
TROUBLE SWALLOWING: 0
BLOOD IN STOOL: 0
COUGH: 0
VOMITING: 0
ABDOMINAL PAIN: 0
ABDOMINAL DISTENTION: 0
SHORTNESS OF BREATH: 0
CONSTIPATION: 0
RHINORRHEA: 0
DIARRHEA: 0
WHEEZING: 0

## 2022-07-28 ASSESSMENT — PATIENT HEALTH QUESTIONNAIRE - PHQ9
SUM OF ALL RESPONSES TO PHQ QUESTIONS 1-9: 0
2. FEELING DOWN, DEPRESSED OR HOPELESS: 0
SUM OF ALL RESPONSES TO PHQ QUESTIONS 1-9: 0
SUM OF ALL RESPONSES TO PHQ9 QUESTIONS 1 & 2: 0
1. LITTLE INTEREST OR PLEASURE IN DOING THINGS: 0

## 2022-07-29 ENCOUNTER — TELEPHONE (OUTPATIENT)
Dept: CARDIOLOGY CLINIC | Age: 62
End: 2022-07-29

## 2022-07-29 ENCOUNTER — OFFICE VISIT (OUTPATIENT)
Dept: FAMILY MEDICINE CLINIC | Age: 62
End: 2022-07-29
Payer: COMMERCIAL

## 2022-07-29 VITALS
WEIGHT: 210 LBS | SYSTOLIC BLOOD PRESSURE: 136 MMHG | DIASTOLIC BLOOD PRESSURE: 64 MMHG | OXYGEN SATURATION: 95 % | TEMPERATURE: 97.2 F | HEART RATE: 42 BPM | BODY MASS INDEX: 28.48 KG/M2

## 2022-07-29 DIAGNOSIS — I10 ESSENTIAL HYPERTENSION: ICD-10-CM

## 2022-07-29 DIAGNOSIS — Z01.818 PREOPERATIVE EXAMINATION: Primary | ICD-10-CM

## 2022-07-29 DIAGNOSIS — R53.82 CHRONIC FATIGUE: ICD-10-CM

## 2022-07-29 DIAGNOSIS — R94.31 ABNORMAL EKG: ICD-10-CM

## 2022-07-29 DIAGNOSIS — E11.59 CORONARY ARTERY DISEASE DUE TO TYPE 2 DIABETES MELLITUS (HCC): ICD-10-CM

## 2022-07-29 DIAGNOSIS — I25.10 CORONARY ARTERY DISEASE DUE TO TYPE 2 DIABETES MELLITUS (HCC): ICD-10-CM

## 2022-07-29 DIAGNOSIS — E11.59 HYPERTENSION ASSOCIATED WITH TYPE 2 DIABETES MELLITUS (HCC): ICD-10-CM

## 2022-07-29 DIAGNOSIS — I15.2 HYPERTENSION ASSOCIATED WITH TYPE 2 DIABETES MELLITUS (HCC): ICD-10-CM

## 2022-07-29 LAB
CREATININE URINE: 63.8 MG/DL (ref 39–259)
MICROALBUMIN UR-MCNC: 6.5 MG/DL
MICROALBUMIN/CREAT UR-RTO: 101.9 MG/G (ref 0–30)
SEDIMENTATION RATE, ERYTHROCYTE: 7 MM/HR (ref 0–20)

## 2022-07-29 PROCEDURE — 99212 OFFICE O/P EST SF 10 MIN: CPT | Performed by: FAMILY MEDICINE

## 2022-07-29 PROCEDURE — 93000 ELECTROCARDIOGRAM COMPLETE: CPT | Performed by: FAMILY MEDICINE

## 2022-07-29 NOTE — TELEPHONE ENCOUNTER
Pt called to schedule an appt with MARIA LUISAN. He had an EKG that came back abnormal.  Per Pt he is having a total hip replacement on 08/15 and needs to get an appt before that date. His EKG was at Dr. Dimitrios Roach office, [phone]. Please advise where Pt can be schedule at.   Thank you

## 2022-07-30 LAB
C-REACTIVE PROTEIN, HIGH SENSITIVITY: 3.59 MG/L (ref 0.16–3)
ESTIMATED AVERAGE GLUCOSE: 203 MG/DL
FERRITIN: 188.1 NG/ML (ref 30–400)
FOLATE: 8.06 NG/ML (ref 4.78–24.2)
HBA1C MFR BLD: 8.7 %
IRON SATURATION: 32 % (ref 20–50)
IRON: 115 UG/DL (ref 59–158)
MAGNESIUM: 1.7 MG/DL (ref 1.8–2.4)
PRO-BNP: 105 PG/ML (ref 0–124)
T4 FREE: 1.5 NG/DL (ref 0.9–1.8)
TOTAL IRON BINDING CAPACITY: 360 UG/DL (ref 260–445)
TSH SERPL DL<=0.05 MIU/L-ACNC: 1.41 UIU/ML (ref 0.27–4.2)
VITAMIN B-12: 464 PG/ML (ref 211–911)
VITAMIN D 25-HYDROXY: 37.6 NG/ML

## 2022-08-01 NOTE — PROGRESS NOTES
2022    PATIENT: Chelsea Lundberg  : 1960    Primary Care Provider:   Ramona Ramirez MD  F:[phone]  Z:[phone]    Reason for evaluation:   Chief Complaint   Patient presents with    Cardiac Clearance    Hypertension    Coronary Artery Disease    Hyperlipidemia       History of present illness:   Mr. Chelsea Lundberg is a 58 y.o. male patient, last seen in the office 3/2019 for CV risk assessment, here on a preoperative basis at the recommendation of Dr. Rajesh Pham following recent EKG. Over the past three years, Ravi Anaya reports compliance with oral agents. Hypertension, hyperlipidemia and diabetes mellitus. He states that he is hopeful to get his right hip replaced. He denies recurrent chest pain or left arm pain. He states in hindsight he may have panicked when he first felt something at that time given his family history. States that he has continued to dip, daily for 44 years. On review of systems, he reports increased fatigue and occasional sweating out of proportion to level of activity. He states that he is unsure if it is due to his decreased fitness level given limitations with his right hip and back. He is currently only golfing once a week and more recently bought on stationary exercise bike. He is hoping to retire in 4 years.     Medical History:      Diagnosis Date    Diabetes mellitus ([address]     Hyperlipidemia     Hypertension        Surgical History:      Procedure Laterality Date    JOINT REPLACEMENT Right     knee    JOINT REPLACEMENT Left     HIP       Social History:  Social History     Socioeconomic History    Marital status:      Spouse name: Not on file    Number of children: Not on file    Years of education: Not on file    Highest education level: Not on file   Occupational History    Not on file   Tobacco Use    Smoking status: Never    Smokeless tobacco: Current     Types: Snuff   Vaping Use    Vaping Use: Never used Substance and Sexual Activity    Alcohol use: Yes     Comment: occassionaly    Drug use: Never    Sexual activity: Not on file   Other Topics Concern    Not on file   Social History Narrative    Not on file     Social Determinants of Health     Financial Resource Strain: Not on file   Food Insecurity: Not on file   Transportation Needs: Not on file   Physical Activity: Not on file   Stress: Not on file   Social Connections: Not on file   Intimate Partner Violence: Not on file   Housing Stability: Not on file        Family History:  No evidence for sudden cardiac death or premature CAD. Problem Relation Age of Onset    Cancer Mother     Diabetes Father     Kidney Disease Father     Heart Attack Father     Heart Attack Brother        Medications:  [x] Medications and dosages reviewed. Prior to Admission medications    Medication Sig Start Date End Date Taking? Authorizing Provider   atorvastatin (LIPITOR) 20 MG tablet TAKE 1 TABLET BY MOUTH AT BEDTIME 6/29/22  Yes Historical Provider, MD   metFORMIN (GLUCOPHAGE) 1000 MG tablet Take 1,000 mg by mouth daily (with breakfast) 12/10/18  Yes Historical Provider, MD   glimepiride (AMARYL) 4 MG tablet daily 1/8/19  Yes Historical Provider, MD   lisinopril-hydrochlorothiazide (PRINZIDE;ZESTORETIC) 20-12.5 MG per tablet TAKE 1 TABLET BY MOUTH ONE TIME A DAY 1/8/19  Yes Historical Provider, MD       Allergies:  Patient has no known allergies.      Review of Systems:    [x]Full ROS obtained and negative except as mentioned in HPI    Physical Examination:    /86 (Site: Right Upper Arm, Position: Sitting, Cuff Size: Large Adult)   Pulse 78   Ht 6' (1.829 m)   Wt 210 lb (95.3 kg)   SpO2 97%   BMI 28.48 kg/m²   Wt Readings from Last 3 Encounters:   08/02/22 210 lb (95.3 kg)   07/29/22 210 lb (95.3 kg)   07/28/22 206 lb 11.2 oz (93.8 kg)       GENERAL: Well developed, well nourished, no acute distress  NEUROLOGICAL: Alert and oriented x3  PSYCH: Normal mood and affect   SKIN: Warm and dry, without lesions  HEENT: Normocephalic, atraumatic, Sclera non-icteric, mucous membranes moist  NECK: supple, JVP normal, thyroid not enlarged   CAROTID: Normal upstroke, no bruits  CARDIAC: Normal PMI, regular rate and rhythm, normal S1S2, no murmur, rub  RESPIRATORY: Normal respiratory effort, clear to auscultation bilaterally  EXTREMITIES: No cyanosis, clubbing or edema, palpable pulses bilaterally   MUSCULOSKELETAL: No joint swelling or tenderness, no chest wall tenderness  GASTROINTESTINAL:  soft, non-tender, no bruit    Labs:  Lab Review   Orders Only on 07/29/2022   Component Date Value    Hemoglobin A1C 07/29/2022 8.7     eAG 07/29/2022 203.0     Pro-BNP 07/29/2022 105     Microalbumin, Random Uri* 07/29/2022 6.50 (A)    Creatinine, Ur 07/29/2022 63.8     Microalbumin Creatinine * 07/29/2022 101.9 (A)    Sed Rate 07/29/2022 7     Magnesium 07/29/2022 1.70 (A)    T4 Free 07/29/2022 1.5     TSH 07/29/2022 1.41     Vit D, 25-Hydroxy 07/29/2022 37.6     Vitamin B-12 07/29/2022 464     Folate 07/29/2022 8.06     Iron 07/29/2022 115     TIBC 07/29/2022 360     Iron Saturation 07/29/2022 32     Ferritin 07/29/2022 188.1     CRP High Sensitivity 07/29/2022 3.59 (A)         Imaging:  I have reviewed the below testing personally:    ECHO 2/5/19   Summary   -Normal left ventricle size, wall thickness and systolic function with an   estimated ejection fraction of 55-60%. -Normal diastolic function. -Mild mitral regurgitation.   -Mild tricuspid regurgitation with an estimated PASP of 35-40 mmHg.     1/17/19  Left Heart Cath  Dominance : Right      LM: short, bifurcating, MLI     LAD: 25-30% eccentric pLAD mildly calcified  just before first large septal  and large first diagonal ; otherwise mild plaquing distally      LCx: large OM1 without disease; MLI     RCA: large, MLI     LVEDP: 10 mmHg   LVEF: 55%    EKG 7/29/22  Sinus  Rhythm    Frequent pvcs -ventricular bigeminy   T wave abnormality-consider inferolateral ischemia     Impression/Recommendations    Mr. Coretta Lloyd is a 58 y.o. male patient, last seen in the office in 2019, with:    CAD: mild nonobstructive (2019 [facility])  Diabetes Mellitus, noninsulin dependent, suboptimal (A1C 8.7, 7/2022)  Hyperlipidemia, due for recheck (3/8/19: , TG 80, HDL 34, LDL 67)   Hypertension, controlled on ACEI/Thiazide    Preoperative Cardiovascular Exam   Abnormal EKG- possible ischemic change  Surgery planned is right total hip replacement 8/15/22   Intermediate risk surgery  New fatigue  Functional capacity is > 4 METS  May proceed with surgery if low risk nuclear myocardial perfusion. Pre-Operative Risk assessment using 2014 ACC/AHA guidelines     Emergent procedure No  Active Cardiac Condition NO (decompensated HF, Arrhythmia, MI <3 weeks, severe valve disease)  Risk Level of Procedure Intermediate Risk (intraperitoneal, intrathoracic, HENT, orthopedic, or carotid endarterectomy, etc.)  Revised Cardiac Risk Index Risk factors: History of ischemic heart disease  Measurement of Exercise Tolerance before Surgery >4 Yes    Return for [employer]. Thank you for allowing me to participate in the care of your patient. Please do not hesitate to call. Jennifer Bravo D.O., [facility]  Interventional Cardiology     o: [phone]  [address] [place] [address], [address] [facility], [address] [place] [address]    NOTE:  This report was transcribed using voice recognition software. Every effort was made to ensure accuracy; however, inadvertent computerized transcription errors may be present. Scribe's Attestation: This note was scribed in the presence of Dr. Maureen Marino DO by August Mayberry RN.    I, Jennifer Bravo, have personally performed the services described in this documentation as scribed by Mariana Ojeda. RAMONA Neri in my presence, and it is both accurate and complete. An electronic signature was used to authenticate this note.

## 2022-08-02 ENCOUNTER — OFFICE VISIT (OUTPATIENT)
Dept: CARDIOLOGY CLINIC | Age: 62
End: 2022-08-02
Payer: COMMERCIAL

## 2022-08-02 VITALS
WEIGHT: 210 LBS | OXYGEN SATURATION: 97 % | HEART RATE: 78 BPM | DIASTOLIC BLOOD PRESSURE: 86 MMHG | SYSTOLIC BLOOD PRESSURE: 128 MMHG | BODY MASS INDEX: 28.44 KG/M2 | HEIGHT: 72 IN

## 2022-08-02 DIAGNOSIS — R94.31 ABNORMAL EKG: ICD-10-CM

## 2022-08-02 DIAGNOSIS — I15.2 HYPERTENSION ASSOCIATED WITH TYPE 2 DIABETES MELLITUS (HCC): ICD-10-CM

## 2022-08-02 DIAGNOSIS — E78.5 HYPERLIPIDEMIA, UNSPECIFIED HYPERLIPIDEMIA TYPE: ICD-10-CM

## 2022-08-02 DIAGNOSIS — I25.10 CORONARY ARTERY DISEASE DUE TO TYPE 2 DIABETES MELLITUS (HCC): Primary | ICD-10-CM

## 2022-08-02 DIAGNOSIS — E11.59 CORONARY ARTERY DISEASE DUE TO TYPE 2 DIABETES MELLITUS (HCC): Primary | ICD-10-CM

## 2022-08-02 DIAGNOSIS — E11.59 HYPERTENSION ASSOCIATED WITH TYPE 2 DIABETES MELLITUS (HCC): ICD-10-CM

## 2022-08-02 PROCEDURE — 3052F HG A1C>EQUAL 8.0%<EQUAL 9.0%: CPT | Performed by: INTERNAL MEDICINE

## 2022-08-02 PROCEDURE — 99214 OFFICE O/P EST MOD 30 MIN: CPT | Performed by: INTERNAL MEDICINE

## 2022-08-03 LAB — TESTOSTERONE TOTAL: 401 NG/DL (ref 220–1000)

## 2022-08-06 NOTE — PROGRESS NOTES
Di Raphael   58 y.o. male   1960    HPI:    Patient was last seen by me on 7/29/2022. Reason(s) for visit:   Chief Complaint   Patient presents with    Discuss Labs     Patient came to discuss his lab results. He has a history of type 2 diabetes and also reported of issues of chronic fatigue. His A1c was 8.7. His last for comparison on file was 8.5, which was done in January 2019. He stated his A1c was as high as 10-11 and as low as 6-7 on keto diet. Regarding issues of chronic fatigue, I had checked thyroid function test, iron studies, B12, folate, vitamin D, ESR, CRP, and testosterone levels and everything was within normal limits. His testosterone level was 101. I also checked a BNP level and that was normal.  His magnesium was low at 1.7. After last office visit, I had referred patient to cardiology for cardiac clearance for his total right hip arthroplasty to be done on 8/15/2022. He was seen by cardiology on 8/2/2022 and a NM cardiac stress test was done on 8/8/2022 and it showed that EF of 40% and signs of ischemia around distal LAD. Patient will have [address] [place] [address] on 8/18/2022. Patient stated that his health has remained the same. No significant changes. He denied issues of chest pain, SOB, wheezing, etc.    No Known Allergies    Current Outpatient Medications on File Prior to Visit   Medication Sig Dispense Refill    atorvastatin (LIPITOR) 20 MG tablet TAKE 1 TABLET BY MOUTH AT BEDTIME      glimepiride (AMARYL) 4 MG tablet daily  1    lisinopril-hydrochlorothiazide (PRINZIDE;ZESTORETIC) 20-12.5 MG per tablet TAKE 1 TABLET BY MOUTH ONE TIME A DAY  1     No current facility-administered medications on file prior to visit.         Family History   Problem Relation Age of Onset    Cancer Mother     Diabetes Father     Kidney Disease Father     Heart Attack Father     Heart Attack Brother        Social History     Tobacco Use    Smoking status: Never    Smokeless tobacco: Current     Types: Snuff   Substance Use Topics    Alcohol use: Yes     Comment: occassionaly        Lab Results   Component Value Date    WBC 5.6 08/03/2022    HGB 14.3 08/03/2022    HCT 41.8 08/03/2022    MCV 86.3 08/03/2022     08/03/2022         Chemistry        Component Value Date/Time     08/03/2022 1315    K 4.3 08/03/2022 1315     08/03/2022 1315    CO2 24 08/03/2022 1315    BUN 20 08/03/2022 1315    CREATININE 1.2 08/03/2022 1315        Component Value Date/Time    CALCIUM 9.8 08/03/2022 1315    ALKPHOS 59 03/08/2019 0750    AST 20 03/08/2019 0750    ALT 24 03/08/2019 0750    BILITOT 0.4 03/08/2019 0750          Lab Results   Component Value Date    ALT 24 03/08/2019    AST 20 03/08/2019    ALKPHOS 59 03/08/2019    BILITOT 0.4 03/08/2019       Review of Systems   Constitutional:  Negative for activity change, appetite change, fatigue, fever and unexpected weight change. HENT:  Negative for congestion, rhinorrhea, sinus pressure and trouble swallowing. Respiratory:  Negative for cough, chest tightness, shortness of breath and wheezing. Cardiovascular:  Negative for chest pain, palpitations and leg swelling. Gastrointestinal:  Negative for abdominal distention, abdominal pain, blood in stool, constipation, diarrhea, nausea and vomiting. Genitourinary:  Negative for dysuria, frequency and hematuria. Musculoskeletal:  Negative for arthralgias and back pain. Skin:  Negative for rash. Neurological:  Negative for dizziness, weakness, light-headedness, numbness and headaches.         Wt Readings from Last 3 Encounters:   08/10/22 212 lb 3.2 oz (96.3 kg)   08/02/22 210 lb (95.3 kg)   07/29/22 210 lb (95.3 kg)       BP Readings from Last 3 Encounters:   08/10/22 118/82   08/02/22 128/86   07/29/22 136/64       Pulse Readings from Last 3 Encounters:   08/10/22 85   08/02/22 78   07/29/22 (!) 42       /82   Pulse 85   Temp 97.2 °F (36.2 °C)   Wt 212 lb 3.2 oz (96.3 kg)   SpO2 95%   BMI 28.78 kg/m²      Physical Exam  Vitals reviewed. Constitutional:       General: He is awake. He is not in acute distress. Appearance: He is overweight. He is not ill-appearing or diaphoretic. HENT:      Head: Normocephalic and atraumatic. No abrasion or masses. Hair is normal.      Right Ear: External ear normal.      Left Ear: External ear normal.      Nose: Nose normal.   Eyes:      General: Lids are normal. Gaze aligned appropriately. No scleral icterus. Right eye: No discharge. Left eye: No discharge. Extraocular Movements: Extraocular movements intact. Conjunctiva/sclera: Conjunctivae normal.   Neck:      Trachea: Phonation normal.   Cardiovascular:      Rate and Rhythm: Normal rate and regular rhythm. Pulmonary:      Effort: Pulmonary effort is normal. No respiratory distress. Breath sounds: No wheezing, rhonchi or rales. Abdominal:      General: Abdomen is flat. There is no distension. Palpations: Abdomen is soft. Musculoskeletal:         General: No deformity. Normal range of motion. Cervical back: Normal range of motion. No erythema. Right lower leg: No edema. Left lower leg: No edema. Skin:     Coloration: Skin is not cyanotic, jaundiced or pale. Findings: No abrasion, abscess, bruising, ecchymosis, erythema, signs of injury, laceration, lesion, petechiae, rash or wound. Neurological:      General: No focal deficit present. Mental Status: He is alert. Mental status is at baseline. GCS: GCS eye subscore is 4. GCS verbal subscore is 5. GCS motor subscore is 6. Cranial Nerves: No cranial nerve deficit, dysarthria or facial asymmetry. Motor: No weakness, tremor, atrophy or seizure activity. Coordination: Coordination normal.      Gait: Gait is intact.    Psychiatric:         Attention and Perception: Attention and perception normal.         Mood and Affect: Mood and affect normal.         Speech: Speech normal. Behavior: Behavior normal. Behavior is cooperative. Thought Content: Thought content normal.     Assessment/Plan:   Ashtyn Ryan was seen today for discuss labs. Diagnoses and all orders for this visit:    Abnormal stress test  Comments: Follow up with LHC planned for next week. Coronary artery disease due to type 2 diabetes mellitus ([facility] [address]  Comments: Will continue current regiment. Will check lipids with A1c in 3 mo. Orders:  -     Dapagliflozin-metFORMIN HCl ER (XIGDUO XR) 5-1000 MG TB24; Take 1 tablet PO BID    Type 2 diabetes mellitus with microalbuminuria, without long-term current use of insulin (HCC)  Comments: Will start on SGLT-2 therapy in addition to continuing other DM meds. Hypertension associated with type 2 diabetes mellitus ([facility] [address]  Comments:  BP over controlled on current medical regiment. Chronic pain of right hip  Comments:  Surgery will have to be delayed due to abnormal stress test and need for LHC. Hypomagnesemia  -     Magnesium 400 MG CAPS; Take 400 mg by mouth daily    I reviewed the plan of care with the patient. Patient acknowledged understanding and agreed with plan of care overall. Medications Discontinued During This Encounter   Medication Reason    metFORMIN (GLUCOPHAGE) 1000 MG tablet Alternate therapy        General information on medications:  -When it comes to medications, whether with starting or adding a new medication or increasing the dose of a current medication, the benefits and risks have to always be considered and weighed over, especially if one is taking other medications as well.    -There are no medications that have no side effects and that there is always a risk involved with taking a medication.    -If a side effect were to occur with starting a new medication or with increasing the dose of a current medication that either the medication can be totally discontinued altogether or simply decrease the dose of it and if this would be the case a

## 2022-08-08 ENCOUNTER — HOSPITAL ENCOUNTER (OUTPATIENT)
Dept: NON INVASIVE DIAGNOSTICS | Age: 62
Discharge: HOME | End: 2022-08-08
Payer: COMMERCIAL

## 2022-08-08 ENCOUNTER — TELEPHONE (OUTPATIENT)
Dept: CARDIOLOGY CLINIC | Age: 62
End: 2022-08-08

## 2022-08-08 DIAGNOSIS — E78.5 HYPERLIPIDEMIA, UNSPECIFIED HYPERLIPIDEMIA TYPE: ICD-10-CM

## 2022-08-08 DIAGNOSIS — R94.31 ABNORMAL EKG: ICD-10-CM

## 2022-08-08 DIAGNOSIS — E11.59 HYPERTENSION ASSOCIATED WITH TYPE 2 DIABETES MELLITUS (HCC): ICD-10-CM

## 2022-08-08 DIAGNOSIS — E11.59 CORONARY ARTERY DISEASE DUE TO TYPE 2 DIABETES MELLITUS (HCC): ICD-10-CM

## 2022-08-08 DIAGNOSIS — R94.39 ABNORMAL CARDIOVASCULAR STRESS TEST: Primary | ICD-10-CM

## 2022-08-08 DIAGNOSIS — I25.10 CORONARY ARTERY DISEASE DUE TO TYPE 2 DIABETES MELLITUS (HCC): ICD-10-CM

## 2022-08-08 DIAGNOSIS — I15.2 HYPERTENSION ASSOCIATED WITH TYPE 2 DIABETES MELLITUS (HCC): ICD-10-CM

## 2022-08-08 DIAGNOSIS — I25.10 CORONARY ARTERY DISEASE INVOLVING NATIVE CORONARY ARTERY OF NATIVE HEART WITHOUT ANGINA PECTORIS: ICD-10-CM

## 2022-08-08 LAB
LV EF: 40 %
LVEF MODALITY: NORMAL

## 2022-08-08 PROCEDURE — 3430000000 HC RX DIAGNOSTIC RADIOPHARMACEUTICAL: Performed by: INTERNAL MEDICINE

## 2022-08-08 PROCEDURE — A9502 TC99M TETROFOSMIN: HCPCS | Performed by: INTERNAL MEDICINE

## 2022-08-08 PROCEDURE — 93017 CV STRESS TEST TRACING ONLY: CPT | Performed by: INTERNAL MEDICINE

## 2022-08-08 PROCEDURE — 78452 HT MUSCLE IMAGE SPECT MULT: CPT | Performed by: INTERNAL MEDICINE

## 2022-08-08 RX ADMIN — TETROFOSMIN 10 MILLICURIE: 1.38 INJECTION, POWDER, LYOPHILIZED, FOR SOLUTION INTRAVENOUS at 07:35

## 2022-08-08 RX ADMIN — TETROFOSMIN 30 MILLICURIE: 1.38 INJECTION, POWDER, LYOPHILIZED, FOR SOLUTION INTRAVENOUS at 09:20

## 2022-08-08 NOTE — TELEPHONE ENCOUNTER
Ronan Mccarthy and discussed stress test results- per [facility]  [place] in agreement  Will schedule

## 2022-08-08 NOTE — PROGRESS NOTES
Patient instructed on [facility]ia Protocol Stress Test Procedure including possible side effects and adverse reactions. Verbalizes knowledge and understanding and denies having any questions.

## 2022-08-10 ENCOUNTER — OFFICE VISIT (OUTPATIENT)
Dept: FAMILY MEDICINE CLINIC | Age: 62
End: 2022-08-10
Payer: COMMERCIAL

## 2022-08-10 VITALS
BODY MASS INDEX: 28.78 KG/M2 | SYSTOLIC BLOOD PRESSURE: 118 MMHG | WEIGHT: 212.2 LBS | OXYGEN SATURATION: 95 % | HEART RATE: 85 BPM | TEMPERATURE: 97.2 F | DIASTOLIC BLOOD PRESSURE: 82 MMHG

## 2022-08-10 DIAGNOSIS — E11.59 CORONARY ARTERY DISEASE DUE TO TYPE 2 DIABETES MELLITUS (HCC): ICD-10-CM

## 2022-08-10 DIAGNOSIS — R94.39 ABNORMAL STRESS TEST: Primary | ICD-10-CM

## 2022-08-10 DIAGNOSIS — E11.59 HYPERTENSION ASSOCIATED WITH TYPE 2 DIABETES MELLITUS (HCC): ICD-10-CM

## 2022-08-10 DIAGNOSIS — E83.42 HYPOMAGNESEMIA: ICD-10-CM

## 2022-08-10 DIAGNOSIS — E11.29 TYPE 2 DIABETES MELLITUS WITH MICROALBUMINURIA, WITHOUT LONG-TERM CURRENT USE OF INSULIN (HCC): ICD-10-CM

## 2022-08-10 DIAGNOSIS — R80.9 TYPE 2 DIABETES MELLITUS WITH MICROALBUMINURIA, WITHOUT LONG-TERM CURRENT USE OF INSULIN (HCC): ICD-10-CM

## 2022-08-10 DIAGNOSIS — G89.29 CHRONIC PAIN OF RIGHT HIP: ICD-10-CM

## 2022-08-10 DIAGNOSIS — I15.2 HYPERTENSION ASSOCIATED WITH TYPE 2 DIABETES MELLITUS (HCC): ICD-10-CM

## 2022-08-10 DIAGNOSIS — M25.551 CHRONIC PAIN OF RIGHT HIP: ICD-10-CM

## 2022-08-10 DIAGNOSIS — I25.10 CORONARY ARTERY DISEASE DUE TO TYPE 2 DIABETES MELLITUS (HCC): ICD-10-CM

## 2022-08-10 PROCEDURE — 99214 OFFICE O/P EST MOD 30 MIN: CPT | Performed by: FAMILY MEDICINE

## 2022-08-10 PROCEDURE — 3052F HG A1C>EQUAL 8.0%<EQUAL 9.0%: CPT | Performed by: FAMILY MEDICINE

## 2022-08-10 RX ORDER — DAPAGLIFLOZIN AND METFORMIN HYDROCHLORIDE 5; 1000 MG/1; MG/1
TABLET, FILM COATED, EXTENDED RELEASE ORAL
Qty: 180 TABLET | Refills: 1 | Status: SHIPPED | OUTPATIENT
Start: 2022-08-10

## 2022-08-10 ASSESSMENT — ENCOUNTER SYMPTOMS
DIARRHEA: 0
ABDOMINAL PAIN: 0
VOMITING: 0
CHEST TIGHTNESS: 0
TROUBLE SWALLOWING: 0
RHINORRHEA: 0
SINUS PRESSURE: 0
COUGH: 0
SHORTNESS OF BREATH: 0
ABDOMINAL DISTENTION: 0
BACK PAIN: 0
NAUSEA: 0
CONSTIPATION: 0
WHEEZING: 0
BLOOD IN STOOL: 0

## 2022-08-10 NOTE — PATIENT INSTRUCTIONS
Your B12 and folate levels are technically normal, but they are low normal. I would consider increasing your diet in these things supplementation. I would also take a magnesium supplement.

## 2022-08-10 NOTE — TELEPHONE ENCOUNTER
Pharmacy called stating that they don't have capsules but can fill it in tablets instead. Just need the okay from doc.      Please advise   Thank you

## 2022-08-11 RX ORDER — SODIUM CHLORIDE 0.9 % (FLUSH) 0.9 %
5-40 SYRINGE (ML) INJECTION PRN
Status: CANCELLED | OUTPATIENT
Start: 2022-08-11

## 2022-08-11 NOTE — TELEPHONE ENCOUNTER
I think you are referring to the magnesium? I also sent in Xigduo XR. Tablets are fine. Thanks. Shadi Alva [address]

## 2022-08-12 RX ORDER — CALCIUM CARBONATE 300MG(750)
400 TABLET,CHEWABLE ORAL DAILY
Qty: 90 TABLET | Refills: 1 | Status: SHIPPED | OUTPATIENT
Start: 2022-08-12

## 2022-08-12 NOTE — TELEPHONE ENCOUNTER
Spoke with Eulalia and she clarified she was referring to the magnesium. I pended the TABS because caps was sent in again on 8/10/22. Sherryle Moder  please advise

## 2022-08-15 ENCOUNTER — TELEPHONE (OUTPATIENT)
Dept: INTERVENTIONAL RADIOLOGY/VASCULAR | Age: 62
End: 2022-08-15

## 2022-08-15 ENCOUNTER — TELEPHONE (OUTPATIENT)
Dept: CARDIOLOGY CLINIC | Age: 62
End: 2022-08-15

## 2022-08-15 DIAGNOSIS — I25.10 CORONARY ARTERY DISEASE DUE TO TYPE 2 DIABETES MELLITUS (HCC): Primary | ICD-10-CM

## 2022-08-15 DIAGNOSIS — R94.39 ABNORMAL NUCLEAR STRESS TEST: Primary | ICD-10-CM

## 2022-08-15 DIAGNOSIS — E11.59 CORONARY ARTERY DISEASE DUE TO TYPE 2 DIABETES MELLITUS (HCC): Primary | ICD-10-CM

## 2022-08-15 RX ORDER — METOPROLOL TARTRATE 50 MG/1
TABLET, FILM COATED ORAL
Qty: 5 TABLET | Refills: 0 | Status: SHIPPED | OUTPATIENT
Start: 2022-08-15 | End: 2022-08-18 | Stop reason: ALTCHOICE

## 2022-08-15 NOTE — TELEPHONE ENCOUNTER
Called Suhail Wei- Gave instructions and scheduled coronary CTA  Per BNN take Metoprolol Tartrate 50 mg BID for 4 doses prior with the final dose being the morning of CTA. Take extra tablet to the test. He verbalized understanding.

## 2022-08-15 NOTE — TELEPHONE ENCOUNTER
Patient is scheduled to have a C on 8/18. Patient's insurance is denying the authorization request.  Please call patient's insurance for peer to peer @ [phone] and refer to Case #[id number] when calling. If authorization is obtained, please provide.  Thanks

## 2022-08-18 ENCOUNTER — HOSPITAL ENCOUNTER (OUTPATIENT)
Dept: CARDIAC CATH/INVASIVE PROCEDURES | Age: 62
Discharge: HOME | End: 2022-08-18
Payer: COMMERCIAL

## 2022-08-18 ENCOUNTER — HOSPITAL ENCOUNTER (OUTPATIENT)
Dept: CT IMAGING | Age: 62
Discharge: HOME | End: 2022-08-18
Payer: COMMERCIAL

## 2022-08-18 VITALS
TEMPERATURE: 97.8 F | WEIGHT: 212 LBS | SYSTOLIC BLOOD PRESSURE: 131 MMHG | BODY MASS INDEX: 28.71 KG/M2 | HEIGHT: 72 IN | DIASTOLIC BLOOD PRESSURE: 84 MMHG | RESPIRATION RATE: 18 BRPM | HEART RATE: 61 BPM | OXYGEN SATURATION: 97 %

## 2022-08-18 DIAGNOSIS — R94.39 ABNORMAL NUCLEAR STRESS TEST: ICD-10-CM

## 2022-08-18 PROCEDURE — 6370000000 HC RX 637 (ALT 250 FOR IP): Performed by: RADIOLOGY

## 2022-08-18 PROCEDURE — 75574 CT ANGIO HRT W/3D IMAGE: CPT

## 2022-08-18 PROCEDURE — 6360000004 HC RX CONTRAST MEDICATION: Performed by: INTERNAL MEDICINE

## 2022-08-18 RX ORDER — NITROGLYCERIN 0.4 MG/1
0.4 TABLET SUBLINGUAL ONCE
Status: COMPLETED | OUTPATIENT
Start: 2022-08-18 | End: 2022-08-18

## 2022-08-18 RX ORDER — METOPROLOL SUCCINATE 25 MG/1
25 TABLET, EXTENDED RELEASE ORAL NIGHTLY
Qty: 30 TABLET | Refills: 3 | Status: SHIPPED | OUTPATIENT
Start: 2022-08-18 | End: 2022-09-08 | Stop reason: SDUPTHER

## 2022-08-18 RX ADMIN — IOPAMIDOL 85 ML: 755 INJECTION, SOLUTION INTRAVENOUS at 08:54

## 2022-08-18 RX ADMIN — NITROGLYCERIN 0.4 MG: 0.4 TABLET, ORALLY DISINTEGRATING SUBLINGUAL at 09:05

## 2022-08-18 ASSESSMENT — PAIN - FUNCTIONAL ASSESSMENT: PAIN_FUNCTIONAL_ASSESSMENT: NONE - DENIES PAIN

## 2022-08-18 NOTE — FLOWSHEET NOTE
Pt tolerated procedure well. VSS. IV removed without difficulty. Pt given d/c instructions and stated understanding. Released in stable condition to home.   /77

## 2022-08-19 ENCOUNTER — TELEPHONE (OUTPATIENT)
Dept: CARDIOLOGY CLINIC | Age: 62
End: 2022-08-19

## 2022-08-19 DIAGNOSIS — R94.39 ABNORMAL NUCLEAR STRESS TEST: ICD-10-CM

## 2022-08-19 DIAGNOSIS — I20.9 ANGINA PECTORIS (HCC): ICD-10-CM

## 2022-08-19 DIAGNOSIS — R93.1 ABNORMAL COMPUTED TOMOGRAPHY ANGIOGRAPHY OF HEART: ICD-10-CM

## 2022-08-19 DIAGNOSIS — E11.59 CORONARY ARTERY DISEASE DUE TO TYPE 2 DIABETES MELLITUS (HCC): Primary | ICD-10-CM

## 2022-08-19 DIAGNOSIS — I25.10 CORONARY ARTERY DISEASE DUE TO TYPE 2 DIABETES MELLITUS (HCC): Primary | ICD-10-CM

## 2022-08-19 DIAGNOSIS — R94.31 ABNORMAL EKG: ICD-10-CM

## 2022-08-19 NOTE — TELEPHONE ENCOUNTER
BNN discussed coronary CTA results at length with Alexis Carvajal and his wife; Will complete LHC per [facility] for direct evaluation of LAD lesion as he is having symptoms (fatigue and diaphoresis out of proportion to his activity) and plans to have hip surgery. Alexis Carvajal is in agreement. Reviewed preprocedure instructions.   Per [facility] start ASA 81 mg daily

## 2022-08-22 ENCOUNTER — APPOINTMENT (OUTPATIENT)
Dept: GENERAL RADIOLOGY | Age: 62
DRG: 247 | End: 2022-08-22
Payer: COMMERCIAL

## 2022-08-22 ENCOUNTER — HOSPITAL ENCOUNTER (INPATIENT)
Age: 62
LOS: 1 days | Discharge: HOME | DRG: 247 | End: 2022-08-23
Attending: EMERGENCY MEDICINE | Admitting: INTERNAL MEDICINE
Payer: COMMERCIAL

## 2022-08-22 DIAGNOSIS — R07.9 ACUTE CHEST PAIN: Primary | ICD-10-CM

## 2022-08-22 LAB
ANION GAP SERPL CALCULATED.3IONS-SCNC: 12 MMOL/L (ref 3–16)
ANION GAP SERPL CALCULATED.3IONS-SCNC: 13 MMOL/L (ref 3–16)
APTT: 26.8 SEC (ref 23–34.3)
BASOPHILS ABSOLUTE: 0 K/UL (ref 0–0.2)
BASOPHILS RELATIVE PERCENT: 0.6 %
BUN BLDV-MCNC: 23 MG/DL (ref 7–20)
BUN BLDV-MCNC: 24 MG/DL (ref 7–20)
CALCIUM SERPL-MCNC: 10.2 MG/DL (ref 8.3–10.6)
CALCIUM SERPL-MCNC: 9.6 MG/DL (ref 8.3–10.6)
CHLORIDE BLD-SCNC: 101 MMOL/L (ref 99–110)
CHLORIDE BLD-SCNC: 96 MMOL/L (ref 99–110)
CO2: 24 MMOL/L (ref 21–32)
CO2: 29 MMOL/L (ref 21–32)
CREAT SERPL-MCNC: 1 MG/DL (ref 0.8–1.3)
CREAT SERPL-MCNC: 1.1 MG/DL (ref 0.8–1.3)
D DIMER: 0.33 UG/ML FEU (ref 0–0.6)
EKG ATRIAL RATE: 89 BPM
EKG DIAGNOSIS: NORMAL
EKG P AXIS: 73 DEGREES
EKG P-R INTERVAL: 124 MS
EKG Q-T INTERVAL: 362 MS
EKG QRS DURATION: 100 MS
EKG QTC CALCULATION (BAZETT): 440 MS
EKG R AXIS: 73 DEGREES
EKG T AXIS: -40 DEGREES
EKG VENTRICULAR RATE: 89 BPM
EOSINOPHILS ABSOLUTE: 0.1 K/UL (ref 0–0.6)
EOSINOPHILS RELATIVE PERCENT: 1.2 %
GFR AFRICAN AMERICAN: >60
GFR AFRICAN AMERICAN: >60
GFR NON-AFRICAN AMERICAN: >60
GFR NON-AFRICAN AMERICAN: >60
GLUCOSE BLD-MCNC: 126 MG/DL (ref 70–99)
GLUCOSE BLD-MCNC: 176 MG/DL (ref 70–99)
GLUCOSE BLD-MCNC: 213 MG/DL (ref 70–99)
HCT VFR BLD CALC: 43.9 % (ref 40.5–52.5)
HCT VFR BLD CALC: 45.2 % (ref 40.5–52.5)
HEMOGLOBIN: 14.3 G/DL (ref 13.5–17.5)
HEMOGLOBIN: 14.9 G/DL (ref 13.5–17.5)
LYMPHOCYTES ABSOLUTE: 1.2 K/UL (ref 1–5.1)
LYMPHOCYTES RELATIVE PERCENT: 15.2 %
MCH RBC QN AUTO: 28.5 PG (ref 26–34)
MCH RBC QN AUTO: 28.9 PG (ref 26–34)
MCHC RBC AUTO-ENTMCNC: 32.7 G/DL (ref 31–36)
MCHC RBC AUTO-ENTMCNC: 33 G/DL (ref 31–36)
MCV RBC AUTO: 87.2 FL (ref 80–100)
MCV RBC AUTO: 87.7 FL (ref 80–100)
MONOCYTES ABSOLUTE: 0.5 K/UL (ref 0–1.3)
MONOCYTES RELATIVE PERCENT: 6.6 %
NEUTROPHILS ABSOLUTE: 6.1 K/UL (ref 1.7–7.7)
NEUTROPHILS RELATIVE PERCENT: 76.4 %
PDW BLD-RTO: 13.8 % (ref 12.4–15.4)
PDW BLD-RTO: 14 % (ref 12.4–15.4)
PERFORMED ON: ABNORMAL
PLATELET # BLD: 229 K/UL (ref 135–450)
PLATELET # BLD: 262 K/UL (ref 135–450)
PMV BLD AUTO: 9 FL (ref 5–10.5)
PMV BLD AUTO: 9.7 FL (ref 5–10.5)
POTASSIUM SERPL-SCNC: 4.6 MMOL/L (ref 3.5–5.1)
POTASSIUM SERPL-SCNC: 4.6 MMOL/L (ref 3.5–5.1)
RBC # BLD: 5.03 M/UL (ref 4.2–5.9)
RBC # BLD: 5.15 M/UL (ref 4.2–5.9)
SODIUM BLD-SCNC: 137 MMOL/L (ref 136–145)
SODIUM BLD-SCNC: 138 MMOL/L (ref 136–145)
TROPONIN: <0.01 NG/ML
WBC # BLD: 7.5 K/UL (ref 4–11)
WBC # BLD: 8 K/UL (ref 4–11)

## 2022-08-22 PROCEDURE — 71046 X-RAY EXAM CHEST 2 VIEWS: CPT

## 2022-08-22 PROCEDURE — 6370000000 HC RX 637 (ALT 250 FOR IP): Performed by: INTERNAL MEDICINE

## 2022-08-22 PROCEDURE — 85379 FIBRIN DEGRADATION QUANT: CPT

## 2022-08-22 PROCEDURE — 99222 1ST HOSP IP/OBS MODERATE 55: CPT | Performed by: INTERNAL MEDICINE

## 2022-08-22 PROCEDURE — 83036 HEMOGLOBIN GLYCOSYLATED A1C: CPT

## 2022-08-22 PROCEDURE — 93005 ELECTROCARDIOGRAM TRACING: CPT | Performed by: EMERGENCY MEDICINE

## 2022-08-22 PROCEDURE — 93005 ELECTROCARDIOGRAM TRACING: CPT | Performed by: INTERNAL MEDICINE

## 2022-08-22 PROCEDURE — 93010 ELECTROCARDIOGRAM REPORT: CPT | Performed by: INTERNAL MEDICINE

## 2022-08-22 PROCEDURE — 2060000000 HC ICU INTERMEDIATE R&B

## 2022-08-22 PROCEDURE — 85730 THROMBOPLASTIN TIME PARTIAL: CPT

## 2022-08-22 PROCEDURE — 2580000003 HC RX 258: Performed by: INTERNAL MEDICINE

## 2022-08-22 PROCEDURE — 36415 COLL VENOUS BLD VENIPUNCTURE: CPT

## 2022-08-22 PROCEDURE — 84484 ASSAY OF TROPONIN QUANT: CPT

## 2022-08-22 PROCEDURE — 80048 BASIC METABOLIC PNL TOTAL CA: CPT

## 2022-08-22 PROCEDURE — 99285 EMERGENCY DEPT VISIT HI MDM: CPT

## 2022-08-22 PROCEDURE — 85027 COMPLETE CBC AUTOMATED: CPT

## 2022-08-22 PROCEDURE — 1200000000 HC SEMI PRIVATE

## 2022-08-22 PROCEDURE — 85025 COMPLETE CBC W/AUTO DIFF WBC: CPT

## 2022-08-22 RX ORDER — HEPARIN SODIUM 1000 [USP'U]/ML
2000 INJECTION, SOLUTION INTRAVENOUS; SUBCUTANEOUS PRN
Status: DISCONTINUED | OUTPATIENT
Start: 2022-08-22 | End: 2022-08-23 | Stop reason: HOSPADM

## 2022-08-22 RX ORDER — METOPROLOL SUCCINATE 25 MG/1
25 TABLET, EXTENDED RELEASE ORAL NIGHTLY
Status: DISCONTINUED | OUTPATIENT
Start: 2022-08-22 | End: 2022-08-23 | Stop reason: HOSPADM

## 2022-08-22 RX ORDER — ASPIRIN 81 MG/1
81 TABLET, CHEWABLE ORAL DAILY
Status: DISCONTINUED | OUTPATIENT
Start: 2022-08-22 | End: 2022-08-22 | Stop reason: SDUPTHER

## 2022-08-22 RX ORDER — CHOLECALCIFEROL (VITAMIN D3) 125 MCG
500 CAPSULE ORAL DAILY
COMMUNITY

## 2022-08-22 RX ORDER — ASPIRIN 81 MG/1
81 TABLET, CHEWABLE ORAL DAILY
COMMUNITY

## 2022-08-22 RX ORDER — GLIPIZIDE 5 MG/1
10 TABLET ORAL
Status: DISCONTINUED | OUTPATIENT
Start: 2022-08-23 | End: 2022-08-23 | Stop reason: HOSPADM

## 2022-08-22 RX ORDER — HYDROCHLOROTHIAZIDE 25 MG/1
12.5 TABLET ORAL DAILY
Status: DISCONTINUED | OUTPATIENT
Start: 2022-08-22 | End: 2022-08-23

## 2022-08-22 RX ORDER — ONDANSETRON 4 MG/1
4 TABLET, ORALLY DISINTEGRATING ORAL EVERY 8 HOURS PRN
Status: DISCONTINUED | OUTPATIENT
Start: 2022-08-22 | End: 2022-08-23 | Stop reason: HOSPADM

## 2022-08-22 RX ORDER — SODIUM CHLORIDE 0.9 % (FLUSH) 0.9 %
5-40 SYRINGE (ML) INJECTION EVERY 12 HOURS SCHEDULED
Status: DISCONTINUED | OUTPATIENT
Start: 2022-08-22 | End: 2022-08-23 | Stop reason: SDUPTHER

## 2022-08-22 RX ORDER — ATORVASTATIN CALCIUM 40 MG/1
40 TABLET, FILM COATED ORAL NIGHTLY
Status: DISCONTINUED | OUTPATIENT
Start: 2022-08-22 | End: 2022-08-22 | Stop reason: DRUGHIGH

## 2022-08-22 RX ORDER — 0.9 % SODIUM CHLORIDE 0.9 %
500 INTRAVENOUS SOLUTION INTRAVENOUS PRN
Status: DISCONTINUED | OUTPATIENT
Start: 2022-08-22 | End: 2022-08-23 | Stop reason: SDUPTHER

## 2022-08-22 RX ORDER — SODIUM CHLORIDE 0.9 % (FLUSH) 0.9 %
5-40 SYRINGE (ML) INJECTION PRN
Status: DISCONTINUED | OUTPATIENT
Start: 2022-08-22 | End: 2022-08-23 | Stop reason: SDUPTHER

## 2022-08-22 RX ORDER — LISINOPRIL 20 MG/1
20 TABLET ORAL DAILY
Status: DISCONTINUED | OUTPATIENT
Start: 2022-08-22 | End: 2022-08-23 | Stop reason: HOSPADM

## 2022-08-22 RX ORDER — NITROGLYCERIN 0.4 MG/1
0.4 TABLET SUBLINGUAL EVERY 5 MIN PRN
Status: DISCONTINUED | OUTPATIENT
Start: 2022-08-22 | End: 2022-08-23 | Stop reason: HOSPADM

## 2022-08-22 RX ORDER — LANOLIN ALCOHOL/MO/W.PET/CERES
400 CREAM (GRAM) TOPICAL DAILY
Status: DISCONTINUED | OUTPATIENT
Start: 2022-08-23 | End: 2022-08-23 | Stop reason: HOSPADM

## 2022-08-22 RX ORDER — ONDANSETRON 2 MG/ML
4 INJECTION INTRAMUSCULAR; INTRAVENOUS EVERY 6 HOURS PRN
Status: DISCONTINUED | OUTPATIENT
Start: 2022-08-22 | End: 2022-08-23 | Stop reason: HOSPADM

## 2022-08-22 RX ORDER — HYDRALAZINE HYDROCHLORIDE 20 MG/ML
10 INJECTION INTRAMUSCULAR; INTRAVENOUS EVERY 6 HOURS PRN
Status: DISCONTINUED | OUTPATIENT
Start: 2022-08-22 | End: 2022-08-23 | Stop reason: HOSPADM

## 2022-08-22 RX ORDER — LISINOPRIL AND HYDROCHLOROTHIAZIDE 20; 12.5 MG/1; MG/1
1 TABLET ORAL DAILY
Status: DISCONTINUED | OUTPATIENT
Start: 2022-08-22 | End: 2022-08-22

## 2022-08-22 RX ORDER — SODIUM CHLORIDE 0.9 % (FLUSH) 0.9 %
10 SYRINGE (ML) INJECTION PRN
Status: DISCONTINUED | OUTPATIENT
Start: 2022-08-22 | End: 2022-08-23 | Stop reason: SDUPTHER

## 2022-08-22 RX ORDER — HEPARIN SODIUM 1000 [USP'U]/ML
4000 INJECTION, SOLUTION INTRAVENOUS; SUBCUTANEOUS ONCE
Status: DISCONTINUED | OUTPATIENT
Start: 2022-08-22 | End: 2022-08-23 | Stop reason: HOSPADM

## 2022-08-22 RX ORDER — ACETAMINOPHEN 650 MG/1
650 SUPPOSITORY RECTAL EVERY 6 HOURS PRN
Status: DISCONTINUED | OUTPATIENT
Start: 2022-08-22 | End: 2022-08-23 | Stop reason: HOSPADM

## 2022-08-22 RX ORDER — HEPARIN SODIUM 10000 [USP'U]/100ML
5-30 INJECTION, SOLUTION INTRAVENOUS CONTINUOUS PRN
Status: DISCONTINUED | OUTPATIENT
Start: 2022-08-22 | End: 2022-08-23 | Stop reason: HOSPADM

## 2022-08-22 RX ORDER — HEPARIN SODIUM 1000 [USP'U]/ML
4000 INJECTION, SOLUTION INTRAVENOUS; SUBCUTANEOUS PRN
Status: DISCONTINUED | OUTPATIENT
Start: 2022-08-22 | End: 2022-08-23 | Stop reason: HOSPADM

## 2022-08-22 RX ORDER — LANOLIN ALCOHOL/MO/W.PET/CERES
500 CREAM (GRAM) TOPICAL DAILY
Status: DISCONTINUED | OUTPATIENT
Start: 2022-08-23 | End: 2022-08-23 | Stop reason: HOSPADM

## 2022-08-22 RX ORDER — ASPIRIN 81 MG/1
81 TABLET, CHEWABLE ORAL DAILY
Status: DISCONTINUED | OUTPATIENT
Start: 2022-08-23 | End: 2022-08-23

## 2022-08-22 RX ORDER — DEXTROSE MONOHYDRATE 100 MG/ML
INJECTION, SOLUTION INTRAVENOUS CONTINUOUS PRN
Status: DISCONTINUED | OUTPATIENT
Start: 2022-08-22 | End: 2022-08-23 | Stop reason: HOSPADM

## 2022-08-22 RX ORDER — POTASSIUM CHLORIDE 7.45 MG/ML
10 INJECTION INTRAVENOUS PRN
Status: DISCONTINUED | OUTPATIENT
Start: 2022-08-22 | End: 2022-08-23 | Stop reason: HOSPADM

## 2022-08-22 RX ORDER — ACETAMINOPHEN 325 MG/1
650 TABLET ORAL EVERY 6 HOURS PRN
Status: DISCONTINUED | OUTPATIENT
Start: 2022-08-22 | End: 2022-08-23 | Stop reason: SDUPTHER

## 2022-08-22 RX ORDER — HEPARIN SODIUM 10000 [USP'U]/100ML
5-30 INJECTION, SOLUTION INTRAVENOUS CONTINUOUS
Status: DISCONTINUED | OUTPATIENT
Start: 2022-08-22 | End: 2022-08-22

## 2022-08-22 RX ORDER — ATORVASTATIN CALCIUM 80 MG/1
80 TABLET, FILM COATED ORAL NIGHTLY
Status: DISCONTINUED | OUTPATIENT
Start: 2022-08-22 | End: 2022-08-23

## 2022-08-22 RX ORDER — POTASSIUM CHLORIDE 20 MEQ/1
40 TABLET, EXTENDED RELEASE ORAL PRN
Status: DISCONTINUED | OUTPATIENT
Start: 2022-08-22 | End: 2022-08-23 | Stop reason: HOSPADM

## 2022-08-22 RX ORDER — SODIUM CHLORIDE 9 MG/ML
INJECTION, SOLUTION INTRAVENOUS PRN
Status: DISCONTINUED | OUTPATIENT
Start: 2022-08-22 | End: 2022-08-23 | Stop reason: SDUPTHER

## 2022-08-22 RX ADMIN — SODIUM CHLORIDE, PRESERVATIVE FREE 10 ML: 5 INJECTION INTRAVENOUS at 20:24

## 2022-08-22 RX ADMIN — ATORVASTATIN CALCIUM 80 MG: 80 TABLET, FILM COATED ORAL at 20:23

## 2022-08-22 RX ADMIN — LISINOPRIL 20 MG: 20 TABLET ORAL at 17:57

## 2022-08-22 RX ADMIN — HYDROCHLOROTHIAZIDE 12.5 MG: 25 TABLET ORAL at 17:57

## 2022-08-22 ASSESSMENT — LIFESTYLE VARIABLES
HOW OFTEN DO YOU HAVE A DRINK CONTAINING ALCOHOL: 2-4 TIMES A MONTH
HOW MANY STANDARD DRINKS CONTAINING ALCOHOL DO YOU HAVE ON A TYPICAL DAY: 3 OR 4

## 2022-08-22 ASSESSMENT — PAIN SCALES - GENERAL: PAINLEVEL_OUTOF10: 0

## 2022-08-22 ASSESSMENT — HEART SCORE: ECG: 1

## 2022-08-22 NOTE — LETTER
LOV 6/18   [facility] [address] [facility]  [address] [address]  Phone: [phone]            August 23, 2022     Patient: Reshma Acharya   YOB: 1960   Date of Visit: 8/22/2022-9/1/2022       To Whom It May Concern: It is my medical opinion that Reshma Acharya may return to work on 9/1/2022. If you have any questions or concerns, please don't hesitate to call.     Sincerely,        Jimbo BOJORQUEZN, RN

## 2022-08-22 NOTE — ED PROVIDER NOTES
EMERGENCY DEPARTMENT PROVIDER NOTE    Patient Identification  Pt Name: Brianna Reyna  MRN: [id number]  [place] 1960  Date of evaluation: 8/22/2022  Provider: Christian Price DO  PCP: Reda Siemens, MD    Chief Complaint  Chest Pain (Patient in with complaints of sob and chest pain. States he has a known 50% block and has angiogram Wednesday had asa pta)      HPI  (History provided by patient)  This is a 58 y.o. male with pertinent past medical history of hypertension, diabetes, CAD, high cholesterol who was brought in by family for shortness of breath which began acutely about 2 hours prior to arrival.  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 Coworkers reported he appeared pale. Associated with 2 days of left-sided mild aching chest pain. Nothing clearly seem to make the symptoms better, however have been improving gradually since onset. Patient took 324 mg aspirin prior to arrival to the emergency department today. At time of my evaluation he reports feeling tired however states his other symptoms have completely resolved. He has known CAD with disease of the LAD, he is scheduled for a cardiac catheterization with Dr. Sofiya Craft on 8/24/2022.     ROS    Const:  No fevers, no chills, no generalized weakness  Skin:  No rash, no lesions  Eyes:  No visual changes, no blurry or double vision, no pain  ENT:  No sore throat, no difficulty swallowing, no ear pain, no sinus pain or congestion  Card:  +chest pain (now resolved), no palpitations, no edema  Resp:  +shortness of breath (now resolved), no cough, no wheezing  Abd:  No abdominal pain, no nausea, no vomiting, no diarrhea  Genitourinary:  No dysuria, no hematuria  MSK:  No joint pain, no myalgia  Neuro:  No focal weakness, no headache, no paresthesia    All other systems reviewed and negative unless otherwise noted in HPI      I have reviewed the following nursing documentation:  Allergies: Patient has no known allergies. Past medical history:   Past Medical History:   Diagnosis Date    Diabetes mellitus ([address]     Hyperlipidemia     Hypertension      Past surgical history:   Past Surgical History:   Procedure Laterality Date    JOINT REPLACEMENT Right     knee    JOINT REPLACEMENT Left     HIP       Home medications:   Previous Medications    ATORVASTATIN (LIPITOR) 20 MG TABLET    TAKE 1 TABLET BY MOUTH AT BEDTIME    DAPAGLIFLOZIN-METFORMIN HCL ER (XIGDUO XR) 5-1000 MG TB24    Take 1 tablet PO BID    GLIMEPIRIDE (AMARYL) 4 MG TABLET    daily    LISINOPRIL-HYDROCHLOROTHIAZIDE (PRINZIDE;ZESTORETIC) 20-12.5 MG PER TABLET    TAKE 1 TABLET BY MOUTH ONE TIME A DAY    MAGNESIUM 400 MG TABS    Take 400 mg by mouth daily    METOPROLOL SUCCINATE (TOPROL XL) 25 MG EXTENDED RELEASE TABLET    Take 1 tablet by mouth at bedtime       Social history:  reports that he has never smoked. His smokeless tobacco use includes snuff. He reports current alcohol use. He reports that he does not use drugs. Family history:    Family History   Problem Relation Age of Onset    Cancer Mother     Diabetes Father     Kidney Disease Father     Heart Attack Father     Heart Attack Brother          Exam  ED Triage Vitals [08/22/22 1200]   BP Temp Temp src Heart Rate Resp SpO2 Height Weight   (!) 154/81 98.1 °F (36.7 °C) -- 95 18 98 % -- --     Nursing note and vitals reviewed. Constitutional: Well developed, well nourished. Non-toxic in appearance. HENT:      Head: Normocephalic and atraumatic. Ears: External ears normal.      Nose: Nose normal.     Mouth: Membrane mucosa moist and pink. Eyes: Anicteric sclera. No discharge. Neck: Supple. Trachea midline. Cardiovascular: RRR; no murmurs, rubs, or gallops. Pulmonary/Chest: Effort normal. No respiratory distress. CTAB. No stridor. No wheezes. No rales. Abdominal: Soft. No distension.   Nontender to deep palpation all quadrants. Musculoskeletal: Moves all extremities. No gross deformity. Neurological: Alert and orientedx4. Face symmetric. Speech is clear. Skin: Warm and dry. No rash. Psychiatric: Normal mood and affect. Behavior is normal.    Procedures      EKG    EKG was reviewed by emergency department physician in the absence of a cardiologist    Narrow complex sinus rhythm, rate 89, normal axis, normal VA and QRS intervals, normal Qtc, no ST elevations or depressions, TWI inferior leads and V6, impression sinus rhythm with sinus arrhythmia and nonspecific T wave morphology, no STEMI, no significant change in morphology from comparison 7/29/2022      Radiology  XR CHEST (2 VW)   Final Result   No acute cardiopulmonary disease.              Labs  Results for orders placed or performed during the hospital encounter of 08/22/22   CBC with Auto Differential   Result Value Ref Range    WBC 8.0 4.0 - 11.0 K/uL    RBC 5.03 4.20 - 5.90 M/uL    Hemoglobin 14.3 13.5 - 17.5 g/dL    Hematocrit 43.9 40.5 - 52.5 %    MCV 87.2 80.0 - 100.0 fL    MCH 28.5 26.0 - 34.0 pg    MCHC 32.7 31.0 - 36.0 g/dL    RDW 13.8 12.4 - 15.4 %    Platelets 354 857 - 658 K/uL    MPV 9.0 5.0 - 10.5 fL    Neutrophils % 76.4 %    Lymphocytes % 15.2 %    Monocytes % 6.6 %    Eosinophils % 1.2 %    Basophils % 0.6 %    Neutrophils Absolute 6.1 1.7 - 7.7 K/uL    Lymphocytes Absolute 1.2 1.0 - 5.1 K/uL    Monocytes Absolute 0.5 0.0 - 1.3 K/uL    Eosinophils Absolute 0.1 0.0 - 0.6 K/uL    Basophils Absolute 0.0 0.0 - 0.2 K/uL   BMP   Result Value Ref Range    Sodium 138 136 - 145 mmol/L    Potassium 4.6 3.5 - 5.1 mmol/L    Chloride 101 99 - 110 mmol/L    CO2 24 21 - 32 mmol/L    Anion Gap 13 3 - 16    Glucose 126 (H) 70 - 99 mg/dL    BUN 24 (H) 7 - 20 mg/dL    Creatinine 1.0 0.8 - 1.3 mg/dL    GFR Non-African American >60 >60    GFR African American >60 >60    Calcium 9.6 8.3 - 10.6 mg/dL   Troponin   Result Value Ref Range    Troponin <0.01 <0.01 ng/mL   EKG 12 Lead   Result Value Ref Range    Ventricular Rate 89 BPM    Atrial Rate 89 BPM    P-R Interval 124 ms    QRS Duration 100 ms    Q-T Interval 362 ms    QTc Calculation (Bazett) 440 ms    P Axis 73 degrees    R Axis 73 degrees    T Axis -40 degrees    Diagnosis       Normal sinus rhythm with sinus arrhythmiaT wave abnormality, consider inferior ischemiaAbnormal ECG       Screenings   Edwards Coma Scale  Eye Opening: Spontaneous  Best Verbal Response: Oriented  Best Motor Response: Obeys commands  Edwards Coma Scale Score: 15 Heart Score for chest pain patients  History: Moderately Suspicious  ECG: Non-Specifc repolarization disturbance/LBTB/PM  Patient Age: > 39 and < 65 years  *Risk factors for Atherosclerotic disease: Diabetes Mellitus, Hypercholesterolemia, Hypertension, Coronary Artery Disease  Risk Factors: > 3 Risk factors or history of atherosclerotic disease*  Troponin: < 1X normal limit  Heart Score Total: 5     Is this patient to be included in the SEP-1 Core Measure due to severe sepsis or septic shock? No   Exclusion criteria - the patient is NOT to be included for SEP-1 Core Measure due to: Infection is not suspected      MDM and ED Course    Patient afebrile and nontoxic. No distress. At time of initial evaluation he is chest pain-free. EKG no STEMI, initial troponin normal, ACS is not immediately evident. Frequent PVCs noted on telemetry monitoring, however no malignant dysrhythmia. CXR without evidence of pneumonia, pneumothorax, mediastinal abnormality or other acute process. Presentation is not consistent with aortic dissection. Pulmonary embolism is considered, however this is not the most likely diagnosis. Remainder of laboratory work-up is reassuring without evidence of endorgan dysfunction or clinically significant electrolyte derangement. Patient is moderate risk by HEART score with known CAD, I am concerned that his constellation of symptoms may represent unstable angina.   I discussed case with Dr. Roseline Mccarty for cardiology who agrees with current management plan and recommends observation overnight with trending cardiac enzymes, will plan for [address] tomorrow. Patient has already received 324mg aspirin today. Case discussed with Dr. Abel Kendall for internal medicine service who will admit. Patient remained alert, hemodynamically stable and in no distress at time of admission. I Dr. Linda Levi am the primary clinician of record. Final Impression  1. Acute chest pain        Blood pressure (!) 143/99, pulse 85, temperature 98.1 °F (36.7 °C), resp. rate 24, SpO2 98 %. Disposition:  DISPOSITION Decision To Admit 08/22/2022 02:46:06 PM      Patient Referrals:  No follow-up provider specified. Discharge Medications:  New Prescriptions    No medications on file       Discontinued Medications:  Discontinued Medications    No medications on file       This chart was generated using the [address] [place] [address] dictation system. I created this record but it may contain dictation errors given the limitations of this technology.     Thad Cortes DO (electronically signed)  Attending Emergency Physician       Thad Cortes DO  08/22/22 2015

## 2022-08-22 NOTE — PROGRESS NOTES
Patient seen in ED, room 5. Admission completed except for: 4 Eyes Assessment, Rights and Responsibilities, orientation to room, Plan of Care, education, white board, height and weight, pain assessment and head to toe assessment. Patient is alert and oriented X 4. Patient lives at home in a one story home with his wife and is being admitted for Unstable Angina. All questions answered.

## 2022-08-22 NOTE — PROGRESS NOTES
Spoke with Dr. Shane Hartmann. Heparin drip to be started if pt has chest pain or if troponin elevated. Pt denies chest pain at this time. 1st troponin negative, second draw pending. Home

## 2022-08-22 NOTE — H&P
History and Physical  Dr. Luis Daniels  8/22/2022    PCP: Danielle Leger MD    Cc:   Chief Complaint   Patient presents with    Chest Pain     Patient in with complaints of sob and chest pain. States he has a known 50% block and has angiogram Wednesday had asa pta       HPI:  Adelso Golden is a 58 y.o. male who has a past medical history of Diabetes mellitus ([facility] [address] Hyperlipidemia, and Hypertension. Patient presents with Unstable angina ([address] HPI  (1-3 for expanded problem focused, ?4 for detailed/comprehensive)     Pt is a 63yo M who presented to the emergency room from work today with recent onset chest pain. Kvng's wife is at bedside and states that he had not been telling her of episodes of left-sided chest and arm sensations described as a dull ache. Kvng's preop stress test led to a follow-up coronary CTA, reporting LAD disease to have progressed to 50%. He was started on metoprolol alongside his long-term aspirin, atorvastatin and lisinopril. He has tolerated that well. Over the last 3 days he and his wife state that episodes of easy fatigability and exertional dyspnea with diaphoresis having increased in frequency, giving examples during golfing and light duty at work. Has been in process of workup per Dr Courtney Galindo cath actually scheduled later this week  Given persistent sx, to be admitted now  Dr Kristin Chiang has evaluated patient, plan is for cath in AM    Problem list of hospitalization thus far: Active Hospital Problems    Diagnosis     Coronary artery disease due to type 2 diabetes mellitus (HCC) [E11.59, I25.10]      Priority: Medium    Hypertension associated with type 2 diabetes mellitus ([address] [E11.59, I15.2]      Priority: Medium    Medical cannabis use [Z79.899]      Priority: Medium    Hyperlipidemia [E78.5]     Unstable angina ([address] [I20.0]          Review of Systems: (1 system for EPF, 2-9 for detailed, 10+ for comprehensive)  Constitutional: Negative for chills and fever.      HENT: Negative for dental problem, nosebleeds and rhinorrhea. Eyes: Negative for photophobia and visual disturbance. Respiratory: Negative for cough, chest tightness and shortness of breath. Cardiovascular: positive  for chest pain and negative for leg swelling. Gastrointestinal: Negative for diarrhea, nausea and vomiting. Endocrine: Negative for polydipsia and polyphagia. Genitourinary: Negative for frequency, hematuria and urgency. Musculoskeletal: Negative for back pain and myalgias. Skin: Negative for rash. Allergic/Immunologic: Negative for food allergies. Neurological: Negative for dizziness, seizures, syncope and facial asymmetry. Hematological: Negative for adenopathy. Psychiatric/Behavioral: Negative for dysphoric mood. The patient is not nervous/anxious. Past Medical History:   Past Medical History:   Diagnosis Date    Diabetes mellitus ([facility] [address]     Hyperlipidemia     Hypertension        Past Surgical History:   Past Surgical History:   Procedure Laterality Date    JOINT REPLACEMENT Right     knee    JOINT REPLACEMENT Left     HIP       Social History:   Social History       Tobacco History       Smoking Status  Never      Smokeless Tobacco Use  Current Smokeless Tobacco Type  Snuff              Alcohol History       Alcohol Use Status  Yes Comment  occassionaly              Drug Use       Drug Use Status  Yes Types  Marijuana (Beronica Velázquez) Comment  medical gummys for pain              Sexual Activity       Sexually Active  Yes Partners  Female                    Fam History:   Family History   Problem Relation Age of Onset    Cancer Mother     Diabetes Father     Kidney Disease Father     Heart Attack Father     Heart Attack Brother        PFSH: The above PMHx, PSHx, SocHx, FamHx has been reviewed by myself.  (1 area for detailed, 2-3 for comprehensive)      Code Status: No Order    Meds - following list of home medications fromelectronic chart has been reviewed by myself  Prior to Admission medications    Medication Sig Start Date End Date Taking?  Authorizing Provider   vitamin B-12 (CYANOCOBALAMIN) 500 MCG tablet Take 500 mcg by mouth daily   Yes Historical Provider, MD   aspirin 81 MG chewable tablet Take 81 mg by mouth daily   Yes Historical Provider, MD   metoprolol succinate (TOPROL XL) 25 MG extended release tablet Take 1 tablet by mouth at bedtime  Patient not taking: Reported on 8/22/2022 8/18/22   Ashwini Nagy DO   Magnesium 400 MG TABS Take 400 mg by mouth daily 8/12/22   Evie Ames MD   Dapagliflozin-metFORMIN HCl ER (XIGDUO XR) 5-1000 MG TB24 Take 1 tablet PO BID 8/10/22   Evie Ames MD   atorvastatin (LIPITOR) 20 MG tablet TAKE 1 TABLET BY MOUTH AT BEDTIME 6/29/22   Historical Provider, MD   glimepiride (AMARYL) 4 MG tablet daily 1/8/19   Historical Provider, MD   lisinopril-hydrochlorothiazide (PRINZIDE;ZESTORETIC) 20-12.5 MG per tablet TAKE 1 TABLET BY MOUTH ONE TIME A DAY 1/8/19   Historical Provider, MD         No Known Allergies          EXAM: (2-7 system for EPF/Detailed, ?8 for Comprehensive)  BP (!) 143/99   Pulse 85   Temp 98.1 °F (36.7 °C)   Resp 24   SpO2 98%   Constitutional: vitals as above: alert, appears stated age and cooperative    Psychiatric: normal insight and judgment, oriented to person, place, time, and general circumstances    Head: Normocephalic, without obvious abnormality, atraumatic    Eyes:lids and lashes normal, conjunctivae and sclerae normal and pupils equal, round, reactive to light and accomodation    EMNT: external ears normal, nares midline    Neck: no carotid bruit, supple, symmetrical, trachea midline and thyroid not enlarged, symmetric, no tenderness/mass/nodules     Respiratory: clear to auscultation and percussion bilaterally with normal respiratory effort    Cardiovascular: normal rate, regular rhythm, normal S1 and S2 and no murmurs    Gastrointestinal: soft, non-tender, non-distended, normal bowel sounds, no masses or organomegaly    Extremities: no clubbing, no edema    Skin:No rashes or nodules noted. Neurologic:negative         LABS:  Labs Reviewed   BASIC METABOLIC PANEL - Abnormal; Notable for the following components:       Result Value    Glucose 126 (*)     BUN 24 (*)     All other components within normal limits   CBC WITH AUTO DIFFERENTIAL   TROPONIN         IMAGING:  Imaging results from the ER have been reviewed in the computerized chart. XR CHEST (2 VW)    Result Date: 8/22/2022  EXAMINATION: TWO XRAY VIEWS OF THE CHEST 8/22/2022 12:39 pm COMPARISON: CTA cardiac, 08/18/2022 HISTORY: ORDERING SYSTEM PROVIDED HISTORY: left chest pain, shortness of breath TECHNOLOGIST PROVIDED HISTORY: Reason for exam:->left chest pain, shortness of breath Reason for Exam: Chest Pain (Patient in with complaints of sob and chest pain. States he has a known 50% block and has angiogram Wednesday had asa pta) FINDINGS: The cardiac silhouette, mediastinal and hilar contours are normal.  There are multiple calcified granuloma. No consolidation, pleural effusion or pneumothorax is identified. Mild-to-moderate multilevel spondylosis. No acute cardiopulmonary disease.      NM Cardiac Stress Test Nuclear Imaging    Result Date: 8/8/2022  Cardiac Perfusion Imaging  Demographics   Patient Name       Bea Watkins   Date of Study      08/08/2022         Gender              Male   Patient Number     [id number]         Date of Birth       1960   Visit Number       [id number]          Age                 58 year(s)   Accession Number   [id number]         Room Number         op   Corporate ID       [id number]           NM Technician       Naima Hull, RICARDOT   Nurse              Veronica Durham,  Interpreting        Roseann Ferris MD,                     RN                 Physician           [facility] Phys[facility] Sherolyn Canavan, DO, [facility]   The procedure was explained in detail to the patient. Risks,  complications and alternative treatments were reviewed. Written consent  was obtained. Procedure Procedure Type:   Nuclear Stress Test:Exercise, NM MYOCARDIAL SPECT REST EXERCISE OR RX   Study location: [facility] - Nuclear Medicine   Indications: Pre-op clearance. Hospital Status: Outpatient. Height: 72 inches Weight: 210 pounds  Risk Factors   The patient risk factors include:Current/Recent(w/in 1 year) tobacco use,  treated and controlled hypercholesterolemia, treated and controlled  hypertension, family history of premature CAD, orally-treated diabetes  mellitus and dyslipidemia. Conclusions   Summary  Small sized apical significant partial reversibility defect of moderate  intensity consistent with ischemia in the territory of the distal LAD . Left ventricular ejection fraction of 40 %. Overall findings represent a intermediate risk scan. Recommendation  Recommend cardiac catheterization depending on clinical appropriateness. Stress Protocols   Resting ECG  Normal sinus rhythm. Nonspecific T wave abnormalities. Frequent premature ventricular contractions. Resting HR:76 bpm     Resting BP:140/69 mmHg  Stress Protocol:Exercise - [place]  Peak HR:144 bpm                                  HR/BP product:[id number]  Peak BP:234/77 mmHg                              Max exercise: 7 METS  Predicted HR: 158 bpm  % of predicted HR: 91  Test duration:6 min and 16 sec  Reason for termination:Physiologic Maximum   ECG Findings  Nondiagnostic due to baseline abnormalities . Arrhythmias  Occasional premature ventricular contractions that improve with exercise. Symptoms  There was stress induced shortness of breath. Symptoms resolved with rest.  Denies any chest pain or discomfort. Complications  Procedure complication was none. Stress Interpretation  Normal EKG response with good exercise tolerance and no chest discomfort.    Imaging Protocols   - One Day   Rest Stress   Isotope:Myoview/Tetrofosmin   Isotope: Myoview/Tetrofosmin  Isotope dose:10.1 mCi         Isotope dose:31.4 mCi  Administration Route:I.V. Administration Route:I.V.  Date:08/08/2022 07:30         Date:08/08/2022 09:20                                 Technique:      Gated  Imaging Results    Stress ejection    Ejection fraction:40 %    EDV :167 ml    ESV :101 ml    Stroke volume :66 ml    LV mass :169 gr  Medical History   Additional Medical History   atorvastatin (LIPITOR) 20 MG tablet TAKE 1 TABLET BY MOUTH AT  BEDTIME  metFORMIN (GLUCOPHAGE) 1000 MG tablet Take 1,000 mg by mouth  daily (with breakfast)  glimepiride (AMARYL) 4 MG tablet daily  lisinopril-hydrochlorothiazide (PRINZIDE;ZESTORETIC) 20-12.5 MG  per tablet TAKE 1 TABLET BY MOUTH MAC [address] Medications   Signatures   ------------------------------------------------------------------  Electronically signed by Graciela Hemphill MD, Krysta Kong  (Interpreting physician) on 08/08/2022 at 11:36  ------------------------------------------------------------------      CTA CARDIAC [facility] THO RIVERA CONTRAST    Result Date: 8/18/2022  EXAMINATION: CTA OF THE CORONARY ARTERIES 8/18/2022 8:55 am TECHNIQUE: Coronary CT angiogram was performed after the bolus administration of intravenous contrast with retrospective cardiac gating. Multiplanar reformatted images were created on a separate workstation by the radiologist. Automated exposure control, iterative reconstruction, and/or weight based adjustment of the mA/kV was utilized to reduce the radiation dose to as low as reasonably achievable. COMPARISON: None.  HISTORY: ORDERING SYSTEM PROVIDED HISTORY: Abnormal nuclear stress test TECHNOLOGIST PROVIDED HISTORY: STAT Creatinine as needed:->Yes Reason for Exam: Abnormal nuclear stress test FINDINGS: Coronary arteries: Calcium score is 345, with a score of 14 in the left main, score of 184 in the LAD, score of 52 in the circumflex, score of 90 in the right coronary artery and a score of 5 in the PDA Right coronary artery: Origin of the right coronary artery is normal.  Scattered calcified and noncalcified plaque is seen throughout the right coronary artery. Right coronary artery gives rise to patent PDA branch and posterolateral branches. Scattered areas of mild narrowing are seen. Left coronary artery: Left main coronary artery is patent Scattered calcified and noncalcified plaque seen throughout the circumflex coronary artery with scattered areas of mild narrowing. Circumflex coronary artery gives rise to patent obtuse marginal branches. Circumflex coronary artery is small distally, compatible with right dominant circulation Scattered calcified and noncalcified plaque seen throughout the left anterior descending coronary artery. Estimated maximal stenosis in the LAD is in the is seen at the junction of the proximal middle 3rd, of approximately 50%   Left anterior descending coronary artery is visualized to the cardiac apex. Patent diagonal branches are seen. Mediastinum: No intimal flap seen in aorta. Ascending aorta measures 3.4 cm. Trace aortic valve calcification is seen. No mitral valve calcification is seen. No central pulmonary embolus identified. No pericardial effusion. No pericardial calcification noted. Small mediastinal and hilar nodes are noted. Lungs/Pleura: No focal consolidation is seen in the lungs. There is scarring in the right lower lobe adjacent to prominent osteophyte. .  Punctate pulmonary nodule is seen along the minor fissure on the right, likely intrapulmonary lymph node by location. Upper Abdomen: Low attenuation is seen liver, compatible with fatty infiltration. Soft Tissues/Bones: Spurring is seen in the spine     Calcified and noncalcified plaque is seen throughout the coronary arteries, with estimated maximal stenosis of approximately 50% in the LAD. Calcium score 345.   This is in the 70th percentile         EKG:   EKG from ER, reviewed by self - it shows sinus rhythm at 89. T wave abnormality-consider inferior ischemia   Old chart reviewed, EKG dated 1/17/19 is reviewed, there is  difference noted. Old study shows sinus at 67, no twi    Lab Results   Component Value Date/Time    GLUCOSE 126 08/22/2022 12:39 PM     No results found for: POCGLU  BP (!) 143/99   Pulse 85   Temp 98.1 °F (36.7 °C)   Resp 24   SpO2 98%     MEDICAL DECISION MAKING:    Principal Problem:    Unstable angina (HCC) -New Problem to me. Pt with continued angial sx  Plan: Pt to be admitted to telemetry floor. Serial cardiac enzymes to be followed. .  Will give ASA, NTG. Pt has IV morphine ordered for pain control. Iv heparin to be given. Cards consulted, they are planning on cath. Active Problems:    Hypertension associated with type 2 diabetes mellitus ([facility] [address]  Plan: Pt home BP meds reviewed and will be continued. IV Hydralazine ordered for control of extremely high blood pressures. Will monitor labs to assess Creat/K for possible complications of medications. type 2 diabetes mellitus ([facility] [address]  Plan: Patient placed on controlled carbohydrate diet. Fingerstick sugars to be checked to monitor for both hypoglycemia as well as hyperglycemia. Sliding scale insulin ordered. Glucagon and dextrose ordered for hypoglycemia. Patient will be continued on home medications. Hemoglobin a1c to be ordered to assess efficacy of therapy. Medical cannabis use    Hyperlipidemia  Plan: stay on statin        Diagnoses as listed above, designated as new or established and plan outlined for each. Data Reviewed:   (1) Lab tests were reviewed or ordered. (1) Radiology tests were reviewed or ordered. (1) Medical test (Echo, EKG, PFT/waldemar) were ordered. (1)History was not obtained from someone other than patient  (1) Old records were reviewed - see HPI/MDM for pertinent details if review done.   (2) Case was discussed with another health care provider: Dr Juan José Washington  (2) Imaging was viewed by myself. (2) EKG  was viewed by myself. The patient is being placed in inpatient status with the expectation of requiring a hospital stay spanning at least two midnights for care and treatment of the problems noted in the problem list.  This determination is also based on thepatients comorbidities and past medical history, the severity and timing of the signs and symptoms upon presentation.     (Please note that portions of this note were completed with a voice recognition program.  Efforts were made to edit the dictations but occasionally words are mis-transcribed.)      Electronically signed by: Jeane Peters MD 8/22/2022

## 2022-08-22 NOTE — CONSULTS
Cardiovascular Consultation     Attending Physician: Jasson Wheeler DO    PATIENT: Oj Prakash  : 1960  MRN: [id number]    Reason for Consultation:   Chief Complaint   Patient presents with    Chest Pain     Patient in with complaints of sob and chest pain. States he has a known 50% block and has angiogram Wednesday had asa pta       History of present illness:   Mr. Oj Prakash is a 58 y.o. male patient, seen in my office on 2022 for preoperative clearance following abnormal EKG, who presented to the emergency room from work today with recent onset chest pain. Kvng's wife is at bedside and states that he had not been telling her of episodes of left-sided chest and arm sensations described as a dull ache. Kvng's preop stress test led to a follow-up coronary CTA, reporting LAD disease to have progressed to 50%. He was started on metoprolol alongside his long-term aspirin, atorvastatin and lisinopril. He has tolerated that well. Over the last 3 days he and his wife state that episodes of easy fatigability and exertional dyspnea with diaphoresis having increased in frequency, giving examples during golfing and light duty at work.      Medical History:      Diagnosis Date    Diabetes mellitus ([address]     Hyperlipidemia     Hypertension        Surgical History:      Procedure Laterality Date    JOINT REPLACEMENT Right     knee    JOINT REPLACEMENT Left     HIP       Social History:  Social History     Socioeconomic History    Marital status:      Spouse name: Not on file    Number of children: Not on file    Years of education: Not on file    Highest education level: Not on file   Occupational History    Not on file   Tobacco Use    Smoking status: Never    Smokeless tobacco: Current     Types: Snuff   Vaping Use    Vaping Use: Never used   Substance and Sexual Activity    Alcohol use: Yes     Comment: occassionaly    Drug use: Never    Sexual activity: Not on file   Other Topics Concern    Not on file   Social History Narrative    Not on file     Social Determinants of Health     Financial Resource Strain: Not on file   Food Insecurity: Not on file   Transportation Needs: Not on file   Physical Activity: Not on file   Stress: Not on file   Social Connections: Not on file   Intimate Partner Violence: Not on file   Housing Stability: Not on file        Family History:  No evidence for sudden cardiac death or premature CAD. Problem Relation Age of Onset    Cancer Mother     Diabetes Father     Kidney Disease Father     Heart Attack Father     Heart Attack Brother      Allergies:  Patient has no known allergies.      Review of Systems:   [x]Full ROS obtained and negative except as mentioned in HPI    Physical Examination:    BP (!) 155/94   Pulse 86   Temp 98.1 °F (36.7 °C)   Resp 10   SpO2 97%   Wt Readings from Last 3 Encounters:   08/18/22 212 lb (96.2 kg)   08/10/22 212 lb 3.2 oz (96.3 kg)   08/02/22 210 lb (95.3 kg)       GENERAL: Well developed, well nourished, no acute distress  NEUROLOGICAL: Alert and oriented x3  PSYCH: Normal mood and affect   SKIN: Warm and dry, without lesions  HEENT: Normocephalic, atraumatic, Sclera non-icteric, mucous membranes moist  NECK: supple, JVP normal, thyroid not enlarged   CAROTID: Normal upstroke, no bruits  CARDIAC: Normal PMI, regular rate and rhythm, normal S1S2, no murmur, rub  RESPIRATORY: Normal respiratory effort, clear to auscultation bilaterally  EXTREMITIES: No cyanosis, clubbing or edema, palpable pulses bilaterally   MUSCULOSKELETAL: No joint swelling or tenderness, no chest wall tenderness  GASTROINTESTINAL:  soft, non-tender, no bruit    Labs:  Lab Review   Lab Results   Component Value Date/Time     08/22/2022 12:39 PM    K 4.6 08/22/2022 12:39 PM     08/22/2022 12:39 PM    CO2 24 08/22/2022 12:39 PM    BUN 24 08/22/2022 12:39 PM    CREATININE 1.0 08/22/2022 12:39 PM    GLUCOSE 126 08/22/2022 12:39 PM    CALCIUM 9.6 08/22/2022 12:39 PM     Lab Results   Component Value Date/Time    TROPONINI <0.01 08/22/2022 12:39 PM     Lab Results   Component Value Date/Time    WBC 8.0 08/22/2022 12:39 PM    HGB 14.3 08/22/2022 12:39 PM    HCT 43.9 08/22/2022 12:39 PM    MCV 87.2 08/22/2022 12:39 PM     08/22/2022 12:39 PM     Lab Results   Component Value Date/Time    CHOL 117 03/08/2019 07:50 AM    TRIG 80 03/08/2019 07:50 AM    HDL 34 03/08/2019 07:50 AM       Imaging:  I have reviewed the below testing personally:    ECHO 2/5/19   Summary   -Normal left ventricle size, wall thickness and systolic function with an   estimated ejection fraction of 55-60%. -Normal diastolic function. -Mild mitral regurgitation.   -Mild tricuspid regurgitation with an estimated PASP of 35-40 mmHg. 1/17/19  Left Heart Cath  Dominance : Right      LM: short, bifurcating, MLI     LAD: 25-30% eccentric pLAD mildly calcified  just before first large septal  and large first diagonal ; otherwise mild plaquing distally      LCx: large OM1 without disease; MLI     RCA: large, MLI     LVEDP: 10 mmHg   LVEF: 55%    8/8/22 SPECT MPI   Summary    Small sized apical significant partial reversibility defect of moderate    intensity consistent with ischemia in the territory of the distal LAD . Left ventricular ejection fraction of 40 %. Overall findings represent a intermediate risk scan. 8/18/22 CCTA  Calcified and noncalcified plaque is seen throughout the coronary arteries,   with estimated maximal stenosis of approximately 50% in the LAD. Calcium score 345.   This is in the 70th percentile     EKG   Sinus  Rhythm    T wave abnormality-consider inferior ischemia      Troponin <0.01     Impression/Recommendations    Mr. Isabel Martinez is a 58 y.o. male patient    Chest pain, concerning for new onset angina  CAD  Hypertension  Hyperlipidemia  Diabetes mellitus      Preoperative clearance ECG earlier this month led to abnormal stress test. This was followed by coronary CTA that was initially managed with the addition of beta blocker. Yoly Owens reports new onset exertional episodes of left chest/arm pain, dyspnea, & diaphoresis. Cardiac cath. Potential for FFR or IVUS of LAD. ASA, statin, beta blocker at this time. Risks, benefits, goals, and alternatives of left heart catheterization with the potential for percutaneous coronary intervention discussed with patient; including stroke, heart attack, kidney damage, death, paralysis, disability, damage to nerves/arteries/veins. All questions answered and informed consent obtained. Further recommendations pending coronary angiography and clinical course. Thank you for allowing me to participate in the care of your patient. Please do not hesitate to call. Ortiz Buckley DO, [facility]  Interventional Cardiology     o: [phone]  [facility] Case[facility] [address] [place], [address]      NOTE:  This report was transcribed using voice recognition software. Every effort was made to ensure accuracy; however, inadvertent computerized transcription errors may be present.

## 2022-08-23 VITALS
HEIGHT: 72 IN | BODY MASS INDEX: 27.32 KG/M2 | HEART RATE: 78 BPM | SYSTOLIC BLOOD PRESSURE: 104 MMHG | RESPIRATION RATE: 16 BRPM | DIASTOLIC BLOOD PRESSURE: 74 MMHG | OXYGEN SATURATION: 96 % | WEIGHT: 201.7 LBS | TEMPERATURE: 97.9 F

## 2022-08-23 LAB
A/G RATIO: 2 (ref 1.1–2.2)
ALBUMIN SERPL-MCNC: 4.7 G/DL (ref 3.4–5)
ALP BLD-CCNC: 60 U/L (ref 40–129)
ALT SERPL-CCNC: 17 U/L (ref 10–40)
ANION GAP SERPL CALCULATED.3IONS-SCNC: 9 MMOL/L (ref 3–16)
AST SERPL-CCNC: 17 U/L (ref 15–37)
BASOPHILS ABSOLUTE: 0.1 K/UL (ref 0–0.2)
BASOPHILS RELATIVE PERCENT: 0.8 %
BILIRUB SERPL-MCNC: 0.4 MG/DL (ref 0–1)
BUN BLDV-MCNC: 23 MG/DL (ref 7–20)
CALCIUM SERPL-MCNC: 9.9 MG/DL (ref 8.3–10.6)
CHLORIDE BLD-SCNC: 99 MMOL/L (ref 99–110)
CO2: 29 MMOL/L (ref 21–32)
CREAT SERPL-MCNC: 1 MG/DL (ref 0.8–1.3)
EKG ATRIAL RATE: 79 BPM
EKG ATRIAL RATE: 87 BPM
EKG DIAGNOSIS: NORMAL
EKG DIAGNOSIS: NORMAL
EKG P AXIS: 47 DEGREES
EKG P AXIS: 72 DEGREES
EKG P-R INTERVAL: 128 MS
EKG P-R INTERVAL: 130 MS
EKG Q-T INTERVAL: 382 MS
EKG Q-T INTERVAL: 398 MS
EKG QRS DURATION: 102 MS
EKG QRS DURATION: 92 MS
EKG QTC CALCULATION (BAZETT): 456 MS
EKG QTC CALCULATION (BAZETT): 459 MS
EKG R AXIS: 88 DEGREES
EKG R AXIS: 88 DEGREES
EKG T AXIS: -48 DEGREES
EKG T AXIS: -78 DEGREES
EKG VENTRICULAR RATE: 79 BPM
EKG VENTRICULAR RATE: 87 BPM
EOSINOPHILS ABSOLUTE: 0.2 K/UL (ref 0–0.6)
EOSINOPHILS RELATIVE PERCENT: 2.5 %
ESTIMATED AVERAGE GLUCOSE: 182.9 MG/DL
ESTIMATED AVERAGE GLUCOSE: 182.9 MG/DL
GFR AFRICAN AMERICAN: >60
GFR NON-AFRICAN AMERICAN: >60
GLUCOSE BLD-MCNC: 142 MG/DL (ref 70–99)
GLUCOSE BLD-MCNC: 154 MG/DL (ref 70–99)
GLUCOSE BLD-MCNC: 190 MG/DL (ref 70–99)
HBA1C MFR BLD: 8 %
HBA1C MFR BLD: 8 %
HCT VFR BLD CALC: 44 % (ref 40.5–52.5)
HEMOGLOBIN: 14.5 G/DL (ref 13.5–17.5)
LEFT VENTRICULAR EJECTION FRACTION MODE: NORMAL
LV EF: 55 %
LYMPHOCYTES ABSOLUTE: 1.7 K/UL (ref 1–5.1)
LYMPHOCYTES RELATIVE PERCENT: 24.6 %
MCH RBC QN AUTO: 28.7 PG (ref 26–34)
MCHC RBC AUTO-ENTMCNC: 33 G/DL (ref 31–36)
MCV RBC AUTO: 87.1 FL (ref 80–100)
MONOCYTES ABSOLUTE: 0.6 K/UL (ref 0–1.3)
MONOCYTES RELATIVE PERCENT: 8 %
NEUTROPHILS ABSOLUTE: 4.5 K/UL (ref 1.7–7.7)
NEUTROPHILS RELATIVE PERCENT: 64.1 %
PDW BLD-RTO: 13.9 % (ref 12.4–15.4)
PERFORMED ON: ABNORMAL
PERFORMED ON: ABNORMAL
PLATELET # BLD: 236 K/UL (ref 135–450)
PMV BLD AUTO: 8.8 FL (ref 5–10.5)
POC ACT LR: 214 SEC
POC ACT LR: 323 SEC
POTASSIUM REFLEX MAGNESIUM: 4.7 MMOL/L (ref 3.5–5.1)
RBC # BLD: 5.05 M/UL (ref 4.2–5.9)
SODIUM BLD-SCNC: 137 MMOL/L (ref 136–145)
TOTAL PROTEIN: 7.1 G/DL (ref 6.4–8.2)
WBC # BLD: 7 K/UL (ref 4–11)

## 2022-08-23 PROCEDURE — 2580000003 HC RX 258: Performed by: INTERNAL MEDICINE

## 2022-08-23 PROCEDURE — C9600 PERC DRUG-EL COR STENT SING: HCPCS

## 2022-08-23 PROCEDURE — 027034Z DILATION OF CORONARY ARTERY, ONE ARTERY WITH DRUG-ELUTING INTRALUMINAL DEVICE, PERCUTANEOUS APPROACH: ICD-10-PCS | Performed by: INTERNAL MEDICINE

## 2022-08-23 PROCEDURE — 36415 COLL VENOUS BLD VENIPUNCTURE: CPT

## 2022-08-23 PROCEDURE — 83036 HEMOGLOBIN GLYCOSYLATED A1C: CPT

## 2022-08-23 PROCEDURE — 6360000004 HC RX CONTRAST MEDICATION: Performed by: INTERNAL MEDICINE

## 2022-08-23 PROCEDURE — B2111ZZ FLUOROSCOPY OF MULTIPLE CORONARY ARTERIES USING LOW OSMOLAR CONTRAST: ICD-10-PCS | Performed by: INTERNAL MEDICINE

## 2022-08-23 PROCEDURE — 4A023N7 MEASUREMENT OF CARDIAC SAMPLING AND PRESSURE, LEFT HEART, PERCUTANEOUS APPROACH: ICD-10-PCS | Performed by: INTERNAL MEDICINE

## 2022-08-23 PROCEDURE — 99153 MOD SED SAME PHYS/QHP EA: CPT

## 2022-08-23 PROCEDURE — C1769 GUIDE WIRE: HCPCS

## 2022-08-23 PROCEDURE — 99152 MOD SED SAME PHYS/QHP 5/>YRS: CPT | Performed by: INTERNAL MEDICINE

## 2022-08-23 PROCEDURE — 6360000002 HC RX W HCPCS

## 2022-08-23 PROCEDURE — C1887 CATHETER, GUIDING: HCPCS

## 2022-08-23 PROCEDURE — 97161 PT EVAL LOW COMPLEX 20 MIN: CPT

## 2022-08-23 PROCEDURE — C1894 INTRO/SHEATH, NON-LASER: HCPCS

## 2022-08-23 PROCEDURE — 80053 COMPREHEN METABOLIC PANEL: CPT

## 2022-08-23 PROCEDURE — 93458 L HRT ARTERY/VENTRICLE ANGIO: CPT

## 2022-08-23 PROCEDURE — C1874 STENT, COATED/COV W/DEL SYS: HCPCS

## 2022-08-23 PROCEDURE — 93458 L HRT ARTERY/VENTRICLE ANGIO: CPT | Performed by: INTERNAL MEDICINE

## 2022-08-23 PROCEDURE — 6370000000 HC RX 637 (ALT 250 FOR IP): Performed by: INTERNAL MEDICINE

## 2022-08-23 PROCEDURE — 85347 COAGULATION TIME ACTIVATED: CPT

## 2022-08-23 PROCEDURE — 92928 PRQ TCAT PLMT NTRAC ST 1 LES: CPT | Performed by: INTERNAL MEDICINE

## 2022-08-23 PROCEDURE — C1725 CATH, TRANSLUMIN NON-LASER: HCPCS

## 2022-08-23 PROCEDURE — 6370000000 HC RX 637 (ALT 250 FOR IP)

## 2022-08-23 PROCEDURE — 97116 GAIT TRAINING THERAPY: CPT

## 2022-08-23 PROCEDURE — 2500000003 HC RX 250 WO HCPCS

## 2022-08-23 PROCEDURE — 2709999900 HC NON-CHARGEABLE SUPPLY

## 2022-08-23 PROCEDURE — 97165 OT EVAL LOW COMPLEX 30 MIN: CPT

## 2022-08-23 PROCEDURE — 93010 ELECTROCARDIOGRAM REPORT: CPT | Performed by: INTERNAL MEDICINE

## 2022-08-23 PROCEDURE — 99152 MOD SED SAME PHYS/QHP 5/>YRS: CPT

## 2022-08-23 PROCEDURE — 85025 COMPLETE CBC W/AUTO DIFF WBC: CPT

## 2022-08-23 PROCEDURE — 97535 SELF CARE MNGMENT TRAINING: CPT

## 2022-08-23 RX ORDER — SODIUM CHLORIDE 0.9 % (FLUSH) 0.9 %
5-40 SYRINGE (ML) INJECTION EVERY 12 HOURS SCHEDULED
Status: DISCONTINUED | OUTPATIENT
Start: 2022-08-23 | End: 2022-08-23 | Stop reason: HOSPADM

## 2022-08-23 RX ORDER — SODIUM CHLORIDE 0.9 % (FLUSH) 0.9 %
5-40 SYRINGE (ML) INJECTION PRN
Status: DISCONTINUED | OUTPATIENT
Start: 2022-08-23 | End: 2022-08-23 | Stop reason: HOSPADM

## 2022-08-23 RX ORDER — OXYCODONE HYDROCHLORIDE AND ACETAMINOPHEN 5; 325 MG/1; MG/1
2 TABLET ORAL EVERY 4 HOURS PRN
Status: DISCONTINUED | OUTPATIENT
Start: 2022-08-23 | End: 2022-08-23 | Stop reason: HOSPADM

## 2022-08-23 RX ORDER — CLOPIDOGREL BISULFATE 75 MG/1
75 TABLET ORAL DAILY
Status: DISCONTINUED | OUTPATIENT
Start: 2022-08-24 | End: 2022-08-23 | Stop reason: HOSPADM

## 2022-08-23 RX ORDER — ACETAMINOPHEN 325 MG/1
650 TABLET ORAL EVERY 4 HOURS PRN
Status: DISCONTINUED | OUTPATIENT
Start: 2022-08-23 | End: 2022-08-23 | Stop reason: HOSPADM

## 2022-08-23 RX ORDER — SODIUM CHLORIDE 9 MG/ML
INJECTION, SOLUTION INTRAVENOUS PRN
Status: DISCONTINUED | OUTPATIENT
Start: 2022-08-23 | End: 2022-08-23 | Stop reason: HOSPADM

## 2022-08-23 RX ORDER — ROSUVASTATIN CALCIUM 20 MG/1
40 TABLET, COATED ORAL NIGHTLY
Status: DISCONTINUED | OUTPATIENT
Start: 2022-08-23 | End: 2022-08-23 | Stop reason: HOSPADM

## 2022-08-23 RX ORDER — ASPIRIN 81 MG/1
81 TABLET ORAL DAILY
Status: DISCONTINUED | OUTPATIENT
Start: 2022-08-24 | End: 2022-08-23 | Stop reason: HOSPADM

## 2022-08-23 RX ORDER — SODIUM CHLORIDE 9 MG/ML
INJECTION, SOLUTION INTRAVENOUS CONTINUOUS
Status: DISCONTINUED | OUTPATIENT
Start: 2022-08-23 | End: 2022-08-23 | Stop reason: HOSPADM

## 2022-08-23 RX ORDER — OXYCODONE HYDROCHLORIDE AND ACETAMINOPHEN 5; 325 MG/1; MG/1
1 TABLET ORAL EVERY 4 HOURS PRN
Status: DISCONTINUED | OUTPATIENT
Start: 2022-08-23 | End: 2022-08-23 | Stop reason: HOSPADM

## 2022-08-23 RX ORDER — METOPROLOL SUCCINATE 25 MG/1
25 TABLET, EXTENDED RELEASE ORAL NIGHTLY
Qty: 30 TABLET | Refills: 3 | Status: SHIPPED | OUTPATIENT
Start: 2022-08-23

## 2022-08-23 RX ORDER — ROSUVASTATIN CALCIUM 40 MG/1
40 TABLET, COATED ORAL NIGHTLY
Qty: 30 TABLET | Refills: 3 | Status: SHIPPED | OUTPATIENT
Start: 2022-08-23

## 2022-08-23 RX ORDER — MORPHINE SULFATE 2 MG/ML
2 INJECTION, SOLUTION INTRAMUSCULAR; INTRAVENOUS
Status: DISCONTINUED | OUTPATIENT
Start: 2022-08-23 | End: 2022-08-23 | Stop reason: HOSPADM

## 2022-08-23 RX ADMIN — SODIUM CHLORIDE: 9 INJECTION, SOLUTION INTRAVENOUS at 11:00

## 2022-08-23 RX ADMIN — Medication 400 MG: at 09:11

## 2022-08-23 RX ADMIN — IOPAMIDOL 114 ML: 755 INJECTION, SOLUTION INTRAVENOUS at 10:40

## 2022-08-23 RX ADMIN — ASPIRIN 81 MG: 81 TABLET, CHEWABLE ORAL at 09:11

## 2022-08-23 RX ADMIN — CYANOCOBALAMIN TAB 1000 MCG 500 MCG: 1000 TAB at 09:11

## 2022-08-23 RX ADMIN — LISINOPRIL 20 MG: 20 TABLET ORAL at 09:11

## 2022-08-23 ASSESSMENT — PAIN SCALES - GENERAL
PAINLEVEL_OUTOF10: 0
PAINLEVEL_OUTOF10: 0

## 2022-08-23 NOTE — DISCHARGE INSTRUCTIONS
LEFT HEART CATHETERIZATION    Care of your puncture site:  Remove bandage 24 hours after the procedure. May shower in 24 hours but do not sit in a bathtub/pool of water for 5 days or until the wound is healed. Inspect the site daily and gently clean using soap and water while standing in the shower. Dry thoroughly and apply a Band-Aid that covers the entire site. Do not apply powder or lotion. Normal Observations:  Soreness or tenderness which may last one week. Mild oozing from the incision site. Possible bruising that could last 2 weeks. Activity:  You may resume driving 24 hours following the procedure. You may resume normal activity in 5 days or after the wound heals. Avoid lifting more than 10 pounds for 5 days or until the wound heals. Avoid strenuous exercise or activity for 1 week. You may return to work 0/4/2737, if applicable. Nutrition:  Regular diet   Drink at least 8 to 10 glasses of decaffeinated, non-alcoholic fluid for the next 24 hours to flush the x-ray dye used for your angiogram out of your body. Call your doctor immediately if your condition worsens, for any other concerns, for a follow-up appointment or if you experience any of the following:  Significant bleeding that does not stop after 10 minutes of applying firm pressure on the puncture site. Increased swelling on the groin or leg. Unusual pain, numbness, or tingling of the groin or down the leg. Any signs of infection such as: redness, yellow drainage at the site, swelling or pain. Other Instructions:  Hold Metformin or Metformin containing drugs for 48 hours after procedure.

## 2022-08-23 NOTE — PLAN OF CARE
Problem: Discharge Planning  Goal: Discharge to home or other facility with appropriate resources  Outcome: Progressing     Problem: Pain  Goal: Verbalizes/displays adequate comfort level or baseline comfort level  Outcome: Progressing     Problem: Cardiovascular - Adult  Goal: Maintains optimal cardiac output and hemodynamic stability  Outcome: Progressing  Goal: Absence of cardiac dysrhythmias or at baseline  Outcome: Progressing

## 2022-08-23 NOTE — PRE SEDATION
Brief Pre-Op Note/Sedation Assessment      Naye Ball  1960  [id number]  9:22 AM    Planned Procedure: Cardiac Catheterization Procedure  Post Procedure Plan: Return to same level of care  Consent: I have discussed with the patient and/or the patient representative the indication, alternatives, and the possible risks and/or complications of the planned procedure and the anesthesia methods. The patient and/or patient representative appear to understand and agree to proceed. Chief Complaint:   Chest Pain/Pressure  Anginal Equivalent      Indications for Cath Procedure:  Presentation:  New Onset Angina <= 2 months, Worsening Angina, and Suspected CAD  2. Anginal Classification within 2 weeks:  CCS IV - Inability to perform any activity without angina or angina at rest, i.e., severe limitation  3. Angina Symptoms Assessment:  Typical Chest Pain  4. Heart Failure Class within last 2 weeks:  No symptoms  5. Cardiovascular Instability:  Yes:  Persistent ischemic symptoms (CP, ST Elevation)    Prior Ischemic Workup/Eval:  Pre-Procedural Medications: Yes: Aspirin, Beta Blockers, and STATIN  2. Stress Test Completed? Yes:  Stress or Imaging Studies Performed (within ANY time period):   Type:  Stress Nuclear  Results:  Positive:  Myocardial Perfusion Defects (Nuclear) Extent of Ischemia:  Intermediate    Does Patient need surgery?   Cath Valve Surgery:  No    Pre-Procedure Medical History:  Vital Signs:  /77   Pulse 86   Temp 97.6 °F (36.4 °C) (Oral)   Resp 18   Ht 6' (1.829 m)   Wt 201 lb 11.2 oz (91.5 kg)   SpO2 97%   BMI 27.36 kg/m²     Allergies:  No Known Allergies  Medications:    Current Facility-Administered Medications   Medication Dose Route Frequency Provider Last Rate Last Admin    magnesium oxide (MAG-OX) tablet 400 mg  400 mg Oral Daily Estrellita Hammer MD   400 mg at 08/23/22 0911    metoprolol succinate (TOPROL XL) extended release tablet 25 mg  25 mg Oral Nightly Larry Dumont Colleen Vences MD        vitamin B-12 (CYANOCOBALAMIN) tablet 500 mcg  500 mcg Oral Daily Nestor Fischer MD   500 mcg at 08/23/22 0911    glipiZIDE (GLUCOTROL) tablet 10 mg  10 mg Oral QAM AC Nestor Fischer MD        sodium chloride flush 0.9 % injection 5-40 mL  5-40 mL IntraVENous 2 times per day Nestor Fischer MD   10 mL at 08/22/22 2024    sodium chloride flush 0.9 % injection 10 mL  10 mL IntraVENous PRN Nestor Fischer MD        0.9 % sodium chloride infusion   IntraVENous PRN Nestor Fischer MD        ondansetron (ZOFRAN-ODT) disintegrating tablet 4 mg  4 mg Oral Q8H PRN Nestor Fischer MD        Or    ondansetron [facility]) injection 4 mg  4 mg IntraVENous Q6H PRN Nestor Fischer MD        acetaminophen (TYLENOL) tablet 650 mg  650 mg Oral Q6H PRN Nestor Fischer MD        Or    acetaminophen (TYLENOL) suppository 650 mg  650 mg Rectal Q6H PRN Nestor Fischer MD        magnesium hydroxide (MILK OF MAGNESIA) 400 MG/5ML suspension 30 mL  30 mL Oral Daily PRN Nestor Fischer MD        aspirin chewable tablet 81 mg  81 mg Oral Daily Nestor Fischer MD   81 mg at 08/23/22 0911    atorvastatin (LIPITOR) tablet 80 mg  80 mg Oral Nightly Nestor Fischer MD   80 mg at 08/22/22 2023    nitroGLYCERIN (NITROSTAT) SL tablet 0.4 mg  0.4 mg SubLINGual Q5 Min PRN Nestor Fischer MD        heparin (porcine) injection 4,000 Units  4,000 Units IntraVENous Once Nestor Fischer MD        heparin (porcine) injection 4,000 Units  4,000 Units IntraVENous PRN Nestor Fischer MD        heparin (porcine) injection 2,000 Units  2,000 Units IntraVENous PRN Nestor Fischer MD        hydrALAZINE (APRESOLINE) injection 10 mg  10 mg IntraVENous Q6H PRN Nestor Fischer MD        0.9 % sodium chloride bolus  500 mL IntraVENous PRN Nestor Fischer MD        potassium chloride (KLOR-CON M) extended release tablet 40 mEq  40 mEq Oral PRN Nestor Fischer MD        Or    potassium bicarb-citric acid (EFFER-K) effervescent tablet 40 mEq 40 mEq Oral PRN Sue Herrera MD        Or    potassium chloride 10 mEq/100 mL IVPB (Peripheral Line)  10 mEq IntraVENous PRN Sue Herrera MD        glucose-vitamin C chewable tablet 4 tablet  4 tablet Oral PRN Sue Herrera MD        dextrose bolus 10% 125 mL  125 mL IntraVENous PRN Sue Herrera MD        Or    dextrose bolus 10% 250 mL  250 mL IntraVENous PRN Sue Herrera MD        glucagon (rDNA) injection 1 mg  1 mg SubCUTAneous PRN Sue Herrera MD        dextrose 10 % infusion   IntraVENous Continuous PRN Sue Herrera MD        sodium chloride flush 0.9 % injection 5-40 mL  5-40 mL IntraVENous PRN Vasquez Niru, DO        lisinopril (PRINIVIL;ZESTRIL) tablet 20 mg  20 mg Oral Daily Sue Herrera MD   20 mg at 08/23/22 9706    And    hydroCHLOROthiazide (HYDRODIURIL) tablet 12.5 mg  12.5 mg Oral Daily Sue Herrera MD   12.5 mg at 08/22/22 1757    heparin 25,000 units in dextrose 5% 250 mL (premix) infusion  5-30 Units/kg/hr IntraVENous Continuous PRN Vasquez [employer], DO           Past Medical History:    Past Medical History:   Diagnosis Date    Diabetes mellitus ([facility] [address]     Hyperlipidemia     Hypertension        Surgical History:    Past Surgical History:   Procedure Laterality Date    JOINT REPLACEMENT Right     knee    JOINT REPLACEMENT Left     HIP             Pre-Sedation:  Pre-Sedation Documentation and Exam:  I have assessed the patient and reviewed the H&P on the chart. Prior History of Anesthesia Complications:   none    Modified Mallampati:  II (soft palate, uvula, fauces visible)    ASA Classification:  Class 2 - A normal healthy patient with mild systemic disease    Radha Scale:   Activity:  2 - Able to move 4 extremities voluntarily on command  Respiration:  2 - Able to breathe deeply and cough freely  Circulation:  2 - BP+/- 20mmHg of normal  Consciousness:  2 - Fully awake  Oxygen Saturation (color):  2 - Able to maintain oxygen saturation >92% on room air    Sedation/Anesthesia Plan:  Guard the patient's safety and welfare. Minimize physical discomfort and pain. Minimize negative psychological responses to treatment by providing sedation and analgesia and maximize the potential amnesia. Patient to meet pre-procedure discharge plan.     Medication Planned:  midazolam intravenously and fentanyl intravenously    Patient is an appropriate candidate for plan of sedation:   yes      Electronically signed by Paxton Sanon MD on 8/23/2022 at 9:22 AM

## 2022-08-23 NOTE — PROGRESS NOTES
[facility], [place], operated by [facility] Daily Progress Note      Admit Date:  8/22/2022    Chief Complaint   Patient presents with    Chest Pain     Patient in with complaints of sob and chest pain. States he has a known 50% block and has angiogram Wednesday had asa pta        Subjective:  Mr. Freddie Hernandez denies exertional chest pain, SOB/DIALLO, PND, palpitations, light-headedness, or edema. No pain overnight.     Objective:   /77   Pulse 86   Temp 97.6 °F (36.4 °C) (Oral)   Resp 18   Ht 6' (1.829 m)   Wt 201 lb 11.2 oz (91.5 kg)   SpO2 97%   BMI 27.36 kg/m²     Intake/Output Summary (Last 24 hours) at 8/23/2022 1034  Last data filed at 8/22/2022 2026  Gross per 24 hour   Intake --   Output 240 ml   Net -240 ml       TELEMETRY: Sinus     Physical Exam:  General:  Awake, alert, oriented x 3, NAD  Skin:  Warm and dry  Neck:  JVD nml  Chest:  normal air entry  Cardiovascular:  RRR S1S2, no S3, no mrmr  Abdomen:  Soft, ND, NT, No HSM  Extremities:  No edema    Medications:    magnesium oxide  400 mg Oral Daily    metoprolol succinate  25 mg Oral Nightly    vitamin B-12  500 mcg Oral Daily    glipiZIDE  10 mg Oral QAM AC    sodium chloride flush  5-40 mL IntraVENous 2 times per day    heparin (porcine)  4,000 Units IntraVENous Once    lisinopril  20 mg Oral Daily    And    hydroCHLOROthiazide  12.5 mg Oral Daily      sodium chloride      dextrose      heparin (PORCINE) Infusion       sodium chloride flush, sodium chloride, ondansetron **OR** ondansetron, acetaminophen **OR** acetaminophen, magnesium hydroxide, nitroGLYCERIN, heparin (porcine), heparin (porcine), hydrALAZINE, sodium chloride, potassium chloride **OR** potassium alternative oral replacement **OR** potassium chloride, glucose, dextrose bolus **OR** dextrose bolus, glucagon (rDNA), dextrose, sodium chloride flush, heparin (PORCINE) Infusion    Lab Data:  CBC:   Recent Labs     08/22/22  1239 08/22/22  1728 08/23/22  0528   WBC 8.0 7.5 7.0   HGB 14.3 14.9 14.5   HCT

## 2022-08-23 NOTE — PROGRESS NOTES
Progress Note - Dr. Ignacio Trevino - Internal Medicine  PCP: Jyoti Cowan MD [address] [facility] / [address] [phone]    Hospital Day: 1  Code Status: Full Code  Current Diet: Diet NPO Exceptions are: Sips of Water with Meds        CC: follow up on medical issues    Subjective:   Tariq Gaona is a 58 y.o. male. Pt seen and examined  Chart reviewed since last visit, labs and imaging below      Pt denies overt chest pain today  Still c/o significant dyspnea  For cath today per dr merida's recs      Review of Systems: (1 system for EPF, 2-9 for detailed, 10+ for comprehensive)  Constitutional: Negative for chills and fever. HENT: Negative for dental problem, nosebleeds and rhinorrhea. Eyes: Negative for photophobia and visual disturbance. Respiratory: Negative for cough, chest tightness and positive for shortness of breath. Cardiovascular: Negative for chest pain and leg swelling. Gastrointestinal: Negative for diarrhea, nausea and vomiting. Endocrine: Negative for polydipsia and polyphagia. Genitourinary: Negative for frequency, hematuria and urgency. Musculoskeletal: Negative for back pain and myalgias. Skin: Negative for rash. Allergic/Immunologic: Negative for food allergies. Neurological: Negative for dizziness, seizures, syncope and facial asymmetry. Hematological: Negative for adenopathy. Psychiatric/Behavioral: Negative for dysphoric mood. The patient is not nervous/anxious. I have reviewed the patient's medical and social history in detail and updated the computerized patient record. To recap: He  has a past medical history of Diabetes mellitus ([address] Hyperlipidemia, and Hypertension. . He  has a past surgical history that includes joint replacement (Right) and joint replacement (Left). . He  reports that he has never smoked. His smokeless tobacco use includes snuff. He reports current alcohol use. He reports current drug use. Drug: Marijuana [employer]). .        Active Hospital Problems    Diagnosis Date Noted    Coronary artery disease due to type 2 diabetes mellitus ([facility] [E11.59, I25.10] 07/26/2022     Priority: Medium    Hypertension associated with type 2 diabetes mellitus ([facility] [E11.59, I15.2] 07/26/2022     Priority: Medium    Medical cannabis use [Z79.899] 07/26/2022     Priority: Medium    Hyperlipidemia [E78.5] 03/08/2019    Unstable angina (HCC) [I20.0]        Current Facility-Administered Medications: magnesium oxide (MAG-OX) tablet 400 mg, 400 mg, Oral, Daily  metoprolol succinate (TOPROL XL) extended release tablet 25 mg, 25 mg, Oral, Nightly  vitamin B-12 (CYANOCOBALAMIN) tablet 500 mcg, 500 mcg, Oral, Daily  glipiZIDE (GLUCOTROL) tablet 10 mg, 10 mg, Oral, QAM AC  sodium chloride flush 0.9 % injection 5-40 mL, 5-40 mL, IntraVENous, 2 times per day  sodium chloride flush 0.9 % injection 10 mL, 10 mL, IntraVENous, PRN  0.9 % sodium chloride infusion, , IntraVENous, PRN  ondansetron (ZOFRAN-ODT) disintegrating tablet 4 mg, 4 mg, Oral, Q8H PRN **OR** ondansetron (ZOFRAN) injection 4 mg, 4 mg, IntraVENous, Q6H PRN  acetaminophen (TYLENOL) tablet 650 mg, 650 mg, Oral, Q6H PRN **OR** acetaminophen (TYLENOL) suppository 650 mg, 650 mg, Rectal, Q6H PRN  magnesium hydroxide (MILK OF MAGNESIA) 400 MG/5ML suspension 30 mL, 30 mL, Oral, Daily PRN  aspirin chewable tablet 81 mg, 81 mg, Oral, Daily  atorvastatin (LIPITOR) tablet 80 mg, 80 mg, Oral, Nightly  nitroGLYCERIN (NITROSTAT) SL tablet 0.4 mg, 0.4 mg, SubLINGual, Q5 Min PRN  heparin (porcine) injection 4,000 Units, 4,000 Units, IntraVENous, Once  heparin (porcine) injection 4,000 Units, 4,000 Units, IntraVENous, PRN  heparin (porcine) injection 2,000 Units, 2,000 Units, IntraVENous, PRN  hydrALAZINE (APRESOLINE) injection 10 mg, 10 mg, IntraVENous, Q6H PRN  0.9 % sodium chloride bolus, 500 mL, IntraVENous, PRN  potassium chloride (KLOR-CON M) extended release tablet 40 mEq, 40 mEq, Oral, PRN **OR** potassium bicarb-citric acid (EFFER-K) effervescent tablet 40 mEq, 40 mEq, Oral, PRN **OR** potassium chloride 10 mEq/100 mL IVPB (Peripheral Line), 10 mEq, IntraVENous, PRN  glucose-vitamin C chewable tablet 4 tablet, 4 tablet, Oral, PRN  dextrose bolus 10% 125 mL, 125 mL, IntraVENous, PRN **OR** dextrose bolus 10% 250 mL, 250 mL, IntraVENous, PRN  glucagon (rDNA) injection 1 mg, 1 mg, SubCUTAneous, PRN  dextrose 10 % infusion, , IntraVENous, Continuous PRN  sodium chloride flush 0.9 % injection 5-40 mL, 5-40 mL, IntraVENous, PRN  lisinopril (PRINIVIL;ZESTRIL) tablet 20 mg, 20 mg, Oral, Daily **AND** hydroCHLOROthiazide (HYDRODIURIL) tablet 12.5 mg, 12.5 mg, Oral, Daily  heparin 25,000 units in dextrose 5% 250 mL (premix) infusion, 5-30 Units/kg/hr, IntraVENous, Continuous PRN         Objective:  /86   Pulse 76   Temp 97.8 °F (36.6 °C) (Oral)   Resp 16   Ht 6' (1.829 m)   Wt 201 lb 11.2 oz (91.5 kg)   SpO2 95%   BMI 27.36 kg/m²      Patient Vitals for the past 24 hrs:   BP Temp Temp src Pulse Resp SpO2 Height Weight   08/23/22 0521 135/86 97.8 °F (36.6 °C) Oral 76 16 95 % -- 201 lb 11.2 oz (91.5 kg)   08/22/22 2345 127/80 98 °F (36.7 °C) Oral 77 16 97 % -- --   08/22/22 2026 -- -- -- -- -- -- -- 206 lb 1.6 oz (93.5 kg)   08/22/22 2000 (!) 142/82 97.9 °F (36.6 °C) Oral 80 16 95 % -- --   08/22/22 1757 (!) 159/84 -- -- -- -- -- -- --   08/22/22 1720 -- 97.8 °F (36.6 °C) Oral -- -- -- 6' (1.829 m) 212 lb (96.2 kg)   08/22/22 1700 (!) 159/84 -- -- 86 16 98 % -- --   08/22/22 1435 (!) 143/99 -- -- 85 24 98 % -- --   08/22/22 1250 (!) 155/94 -- -- 86 10 97 % -- --   08/22/22 1200 (!) 154/81 98.1 °F (36.7 °C) -- 95 18 98 % -- --     Patient Vitals for the past 96 hrs (Last 3 readings):   Weight   08/23/22 0521 201 lb 11.2 oz (91.5 kg)   08/22/22 2026 206 lb 1.6 oz (93.5 kg)   08/22/22 1720 212 lb (96.2 kg)           Intake/Output Summary (Last 24 hours) at 8/23/2022 2160  Last data filed at 8/22/2022 2026  Gross per 24 hour   Intake --   Output 240 ml   Net -240 ml         Physical Exam: (2-7 system for EPF/Detailed, ?8 for Comprehensive)  /86   Pulse 76   Temp 97.8 °F (36.6 °C) (Oral)   Resp 16   Ht 6' (1.829 m)   Wt 201 lb 11.2 oz (91.5 kg)   SpO2 95%   BMI 27.36 kg/m²   Constitutional: vitals as above: alert, appears stated age and cooperative    Psychiatric: normal insight and judgment, oriented to person, place, time, and general circumstances    Head: Normocephalic, without obvious abnormality, atraumatic    Eyes:lids and lashes normal, conjunctivae and sclerae normal and pupils equal, round, reactive to light and accomodation    EMNT: external ears normal, nares midline    Neck: no carotid bruit, supple, symmetrical, trachea midline and thyroid not enlarged, symmetric, no tenderness/mass/nodules     Respiratory: clear to auscultation and percussion bilaterally with normal respiratory effort    Cardiovascular: normal rate, regular rhythm, normal S1 and S2 and no murmurs    Gastrointestinal: soft, non-tender, non-distended, normal bowel sounds, no masses or organomegaly    Extremities: no clubbing, no edema    Skin:No rashes or nodules noted.     Neurologic:negative         Labs:  Lab Results   Component Value Date    WBC 7.0 08/23/2022    HGB 14.5 08/23/2022    HCT 44.0 08/23/2022     08/23/2022    CHOL 117 03/08/2019    TRIG 80 03/08/2019    HDL 34 (L) 03/08/2019    ALT 17 08/23/2022    AST 17 08/23/2022     08/23/2022    K 4.7 08/23/2022    CL 99 08/23/2022    CREATININE 1.0 08/23/2022    BUN 23 (H) 08/23/2022    CO2 29 08/23/2022    TSH 1.41 07/29/2022    INR 1.02 08/03/2022    LABA1C 8.7 07/29/2022    LABMICR 6.50 (H) 07/29/2022     Lab Results   Component Value Date    TROPONINI <0.01 08/22/2022       Recent Imaging Results are Reviewed:  XR CHEST (2 VW)    Result Date: 8/22/2022  EXAMINATION: TWO XRAY VIEWS OF THE CHEST 8/22/2022 12:39 pm COMPARISON: CTA cardiac, 08/18/2022 HISTORY: ORDERING SYSTEM PROVIDED HISTORY: left chest pain, shortness of breath TECHNOLOGIST PROVIDED HISTORY: Reason for exam:->left chest pain, shortness of breath Reason for Exam: Chest Pain (Patient in with complaints of sob and chest pain. States he has a known 50% block and has angiogram Wednesday had asa pta) FINDINGS: The cardiac silhouette, mediastinal and hilar contours are normal.  There are multiple calcified granuloma. No consolidation, pleural effusion or pneumothorax is identified. Mild-to-moderate multilevel spondylosis. No acute cardiopulmonary disease. NM Cardiac Stress Test Nuclear Imaging    Result Date: 8/8/2022  Cardiac Perfusion Imaging  Demographics   Patient Name       Jeane Lyles   Date of Study      08/08/2022         Gender              Male   Patient Number     [id number]         Date of Birth       1960   Visit Number       [id number]          Age                 58 year(s)   Accession Number   [id number]         Room Number         op   Corporate ID       [id number]           NM Technician       Lori De Guzman, ARRT   Nurse              Freda Garcia,  Interpreting        Chandu Carvalho MD,                     RN                 Physician           [facility], [facility] Physician Timmy Walters DO, [facility]   The procedure was explained in detail to the patient. Risks,  complications and alternative treatments were reviewed. Written consent  was obtained. Procedure Procedure Type:   Nuclear Stress Test:Exercise, NM MYOCARDIAL SPECT REST EXERCISE OR RX   Study location: [facility] - Nuclear Medicine   Indications: Pre-op clearance. Hospital Status: Outpatient.   Height: 72 inches Weight: 210 pounds  Risk Factors   The patient risk factors include:Current/Recent(w/in 1 year) tobacco use,  treated and controlled hypercholesterolemia, treated and controlled  hypertension, family history of premature CAD, orally-treated diabetes  mellitus and dyslipidemia. Conclusions   Summary  Small sized apical significant partial reversibility defect of moderate  intensity consistent with ischemia in the territory of the distal LAD . Left ventricular ejection fraction of 40 %. Overall findings represent a intermediate risk scan. Recommendation  Recommend cardiac catheterization depending on clinical appropriateness. Stress Protocols   Resting ECG  Normal sinus rhythm. Nonspecific T wave abnormalities. Frequent premature ventricular contractions. Resting HR:76 bpm     Resting BP:140/69 mmHg  Stress Protocol:Exercise - [place]  Peak HR:144 bpm                                  HR/BP product:[id number]  Peak BP:234/77 mmHg                              Max exercise: 7 METS  Predicted HR: 158 bpm  % of predicted HR: 91  Test duration:6 min and 16 sec  Reason for termination:Physiologic Maximum   ECG Findings  Nondiagnostic due to baseline abnormalities . Arrhythmias  Occasional premature ventricular contractions that improve with exercise. Symptoms  There was stress induced shortness of breath. Symptoms resolved with rest.  Denies any chest pain or discomfort. Complications  Procedure complication was none. Stress Interpretation  Normal EKG response with good exercise tolerance and no chest discomfort. Imaging Protocols   - One Day   Rest                          Stress   Isotope:Myoview/Tetrofosmin   Isotope: Myoview/Tetrofosmin  Isotope dose:10.1 mCi         Isotope dose:31.4 mCi  Administration Route:I.V.      Administration Route:I.V.  Date:08/08/2022 07:30         Date:08/08/2022 09:20                                 Technique:      Gated  Imaging Results    Stress ejection    Ejection fraction:40 %    EDV :167 ml    ESV :101 ml    Stroke volume :66 ml    LV mass :169 gr  Medical History   Additional Medical History   atorvastatin (LIPITOR) 20 MG tablet TAKE 1 TABLET BY MOUTH AT  BEDTIME  metFORMIN (GLUCOPHAGE) 1000 MG tablet Take obtuse marginal branches. Circumflex coronary artery is small distally, compatible with right dominant circulation Scattered calcified and noncalcified plaque seen throughout the left anterior descending coronary artery. Estimated maximal stenosis in the LAD is in the is seen at the junction of the proximal middle 3rd, of approximately 50%   Left anterior descending coronary artery is visualized to the cardiac apex. Patent diagonal branches are seen. Mediastinum: No intimal flap seen in aorta. Ascending aorta measures 3.4 cm. Trace aortic valve calcification is seen. No mitral valve calcification is seen. No central pulmonary embolus identified. No pericardial effusion. No pericardial calcification noted. Small mediastinal and hilar nodes are noted. Lungs/Pleura: No focal consolidation is seen in the lungs. There is scarring in the right lower lobe adjacent to prominent osteophyte. .  Punctate pulmonary nodule is seen along the minor fissure on the right, likely intrapulmonary lymph node by location. Upper Abdomen: Low attenuation is seen liver, compatible with fatty infiltration. Soft Tissues/Bones: Spurring is seen in the spine     Calcified and noncalcified plaque is seen throughout the coronary arteries, with estimated maximal stenosis of approximately 50% in the LAD. Calcium score 345. This is in the 70th percentile       Lab Results   Component Value Date/Time    GLUCOSE 154 08/23/2022 05:28 AM     Lab Results   Component Value Date/Time    POCGLU 142 08/23/2022 07:32 AM     /86   Pulse 76   Temp 97.8 °F (36.6 °C) (Oral)   Resp 16   Ht 6' (1.829 m)   Wt 201 lb 11.2 oz (91.5 kg)   SpO2 95%   BMI 27.36 kg/m²     Assessment and Plan:  Principal Problem:    Unstable angina ([address] -Established problem. Stable. Still with symptoms though to be anginal equivalent  Plan: for cath today  Active Problems:    Hypertension associated with type 2 diabetes mellitus ([address] -Established problem. Stable. 135/86  Plan: Continue present orders/plan. type 2 diabetes mellitus ([address] -Established problem. Stable. FSBS 142  Plan: Patient placed on controlled carbohydrate diet. Fingerstick sugars to be checked to monitor for both hypoglycemia as well as hyperglycemia. Sliding scale insulin ordered. Glucagon and dextrose ordered for hypoglycemia. Patient will be continued on home medications. Hemoglobin a1c to be ordered to assess efficacy of therapy. Medical cannabis use -Established problem. Stable. Plan: Continue present orders/plan.      Hyperlipidemia  Plan: stay on same meds      (Please note that portions of this note were completed with a voice recognition program.  Efforts were made to edit the dictations but occasionally words are mis-transcribed.)        Jose Daniel Leos MD  8/23/2022

## 2022-08-23 NOTE — PROGRESS NOTES
[facility] Department   Phone: [phone]    Physical Therapy    [x] Initial Evaluation            [] Daily Treatment Note         [x] Discharge Summary      Patient: Gaurav Daniels   : 1960   MRN: [id number]   Date of Service:  2022  Admitting Diagnosis: Unstable angina [facility])  Current Admission Summary: This is a 58 y.o. male with pertinent past medical history of hypertension, diabetes, CAD, high cholesterol who was brought in by family for shortness of breath which began acutely about 2 hours prior to arrival.  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 Coworkers reported he appeared pale. Associated with 2 days of left-sided mild aching chest pain. Nothing clearly seem to make the symptoms better, however have been improving gradually since onset. Patient took 324 mg aspirin prior to arrival to the emergency department today. At time of my evaluation he reports feeling tired however states his other symptoms have completely resolved. He has known CAD with disease of the LAD, he is scheduled for a cardiac catheterization with Dr. Shane Hartmann on 2022. Past Medical History:  has a past medical history of Diabetes mellitus ([address] Hyperlipidemia, and Hypertension. Past Surgical History:  has a past surgical history that includes joint replacement (Right) and joint replacement (Left). Discharge Recommendations: Gaurav Daniels scored a 24/24 on the AM-PAC short mobility form. At this time, no further PT is recommended upon discharge due to patient at independent level. Recommend patient returns to prior setting with prior services.     DME Required For Discharge: No new DME required  Precautions/Restrictions: low fall risk    Pre-Admission Information   Lives With: spouse                  Type of Home: house  Home Layout: one level  Home Access: level entry   [address]: walker accessible, walk in shower  Bathroom Equipment: hand held shower head  Toilet Height: standard height  Home Equipment: rolling walker  Transfer Assistance: Independent without use of device  Ambulation Assistance:Independent without use of device  ADL Assistance: independent with all ADL's  IADL Assistance: independent with homemaking tasks  Active :        [x] Yes                 [] No  Hand Dominance: [] Left                 [x] Right  Current Employment: full time employment. 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  Hobbies: golfing, playing with [employer]   Recent Falls: no falls in last 6 months      Examination   Vision:   Vision Corrective Device: wears glasses for reading  Hearing:   [employer] [facility]  Observation:   General Observation:  Pt appears normal coloring with no visible abnormalities with activity despite reports of paleness and sweating at time of arrival in ED  Posture:    WNL  Sensation:   WFL  ROM:   (B) LE ROM WFL  Strength:   (B) LE gross strength WFL  Decision Making: low complexity  Clinical Presentation: stable      Subjective  General: Pt denies pain upon arrival, reports mild fatigue and some SOB after ambulation and stairs   Pain: 0/10  Pain Interventions: not applicable       Functional Mobility  Bed Mobility  Supine to Sit: Independent  Comments:  Transfers  Sit to stand transfer: Independent  Stand to sit transfer: Independent  Comments:  Ambulation  Surface:level surface  Assistance: Independent  Distance: 250'  Gait Mechanics: WNL  Comments:    Stair Mobility  Number of Steps: 15  Hand Rails: (L) ascending handrail  Assistance: modified independent  Comments:  Balance  Static Sitting Balance: good(+): independent with high level dynamic balance in unsupported position  Dynamic Sitting Balance: good(+): independent with high level dynamic balance in unsupported position  Static Standing Balance: good(+): independent with high level dynamic balance in unsupported position  Dynamic Standing Balance: good(+): independent with high level dynamic balance in unsupported position  Comments:    Other Therapeutic Interventions:     Functional Outcomes  AM-PAC Inpatient Mobility Raw Score : 24              Cognition  WFL  Orientation:    A&O x 4    Education  Barriers To Learning: none  Patient Education: patient educated on PT role and benefits, energy conservation, disease specific education, discharge recommendations, general activity recommendations for cardiovascular health   Learning Assessment:  Pt verbalized and demonstrates understanding    Assessment  Impairments Requiring Therapeutic Intervention: none - eval with same day discharge  Prognosis: good without need for therapy intervention  Clinical Assessment: Patient presenting at independent level for completion of required mobility tasks for return to home. Eval with d/c at this time. No therapy services indicated. Safety Interventions: patient left in chair, call light within reach, and nurse notified    Plan  Frequency: Eval with same day discharge. No follow up required. Current Treatment Recommendations: Not applicable, evaluation completed with same day discharge. Goals  Patient eval with same day discharge. No goals set as patient is at baseline mobility status.       Therapy Session Time      Individual Group Co-treatment   Time In     3868   Time Out     0910   Minutes     23     Timed Code Treatment Minutes:  8   Total Treatment Minutes:  23       Electronically Signed By: Waldemar Marie, PT  Catracho Blackman DPT [id number]

## 2022-08-23 NOTE — DISCHARGE SUMMARY
[facility] -- Physician Discharge Summary     Maricruz Ramos  1960  MRN: [id number]    Admit Date: 8/22/2022  Discharge Date: No discharge date for patient encounter. Attending MD: Juanita Ruby MD  Discharging MD: Juanita Ruby MD  PCP: Elsa Garcia MD [address] [facility] / [address] [phone]    Admission Diagnosis: Unstable angina [facility]) [I20.0]  Acute chest pain [R07.9]  DISCHARGE DIAGNOSIS: same    Full Hospital Problem List:  Active Hospital Problems    Diagnosis Date Noted    Coronary artery disease due to type 2 diabetes mellitus ([facility] [address] [E11.59, I25.10] 07/26/2022     Priority: Medium    Hypertension associated with type 2 diabetes mellitus ([facility] [address] [E11.59, I15.2] 07/26/2022     Priority: Medium    Medical cannabis use [Z79.899] 07/26/2022     Priority: Medium    Hyperlipidemia [E78.5] 03/08/2019    Unstable angina ([facility] [address] [I20.0]            Hospital Course:  Tommy is a 65yo M who presented to the emergency room from work today with recent onset chest pain. Kvng's wife is at bedside and states that he had not been telling her of episodes of left-sided chest and arm sensations described as a dull ache. Kvng's preop stress test led to a follow-up coronary CTA, reporting LAD disease to have progressed to 50%. He was started on metoprolol alongside his long-term aspirin, atorvastatin and lisinopril. He has tolerated that well. Over the last 3 days he and his wife state that episodes of easy fatigability and exertional dyspnea with diaphoresis having increased in frequency, giving examples during golfing and light duty at work.       Has been in process of workup per Dr Christopher Mae cath actually scheduled later this week  Given persistent sx, to be admitted now  Dr Yousif Knapp has evaluated patient, plan is for cath    Cardiac Cath LVG, PCI:  Anatomy:   LM-nml   LAD-prox 70%, mid 80%, distal 40%  Cx-nml  OM- nml  RCA-dom 10%  RPDA- nml  LVEF- 55%  LVG- nml  LVEDP- 3     Intervention  ~Successful PCI to LAD with 3.5x33 MALVIN. PD with 3.5x12 NC Excellent Result. Contrast: 114  Flouro Time: 6.6  Access: R Radial a     Impression  ~Coronary Angiography w/ severe single vessel CAD  ~LVG with LVEF of 3 and no regional wall motion abnormalities  ~Successful complex angioplasty and stenting of LAD           Recommendation  ~Aggressive medical treatment and risk factor modification  ~1. Post cath IVF. Bedrest.   2. Recommend beta blocker, high potency statin, aspirin and plavix for 6-12 months. No ace/arb required given normal LVEF. 3. Referral to outpatient cardiac rehab phase II will be deferred until patient follow-up in office and then determine patient safety and appropriateness to proceed  4. Patient has been advised on the importance of regular exercise of at least 20-30 minutes daily. 5. Patient counseled about and offered assistance for smoking cessation   6. OK to discharge home today.  Follow up in 2 weeks with cardiology      Pt is switched to po rosuvastatin per cards    Consults made during Hospitalization:  IP CONSULT TO CARDIOLOGY  IP CONSULT TO PRIMARY CARE PROVIDER  IP CONSULT TO CARDIOLOGY  IP CONSULT TO [address] [place] [address]    Treatment team at time of Discharge: Treatment Team: Attending Provider: Teo Kohler MD; Consulting Physician: Xavier Morley DO; Consulting Physician: Teo Kohler MD; Registered Nurse: Selma Willard RN; Physical Therapist: Jeanine Cheng PT; Respiratory Therapist (Day): Margaret Curiel RCP; Utilization Reviewer: Megan Garcia RN; Occupational Therapist: Lex Ocasio OT    Imaging Results:  XR CHEST (2 VW)    Result Date: 8/22/2022  EXAMINATION: TWO XRAY VIEWS OF THE CHEST 8/22/2022 12:39 pm COMPARISON: CTA cardiac, 08/18/2022 HISTORY: ORDERING SYSTEM PROVIDED HISTORY: left chest pain, shortness of breath TECHNOLOGIST PROVIDED HISTORY: Reason for exam:->left chest pain, shortness of breath Reason for Exam: Chest Pain (Patient in with complaints of sob and chest pain. States he has a known 50% block and has angiogram Wednesday had asa pta) FINDINGS: The cardiac silhouette, mediastinal and hilar contours are normal.  There are multiple calcified granuloma. No consolidation, pleural effusion or pneumothorax is identified. Mild-to-moderate multilevel spondylosis. No acute cardiopulmonary disease. NM Cardiac Stress Test Nuclear Imaging    Result Date: 8/8/2022  Cardiac Perfusion Imaging  Demographics   Patient Name       Sulma Delaney   Date of Study      08/08/2022         Gender              Male   Patient Number     [id number]         Date of Birth       1960   Visit Number       [id number]          Age                 58 year(s)   Accession Number   [id number]         Room Number         op   Corporate ID       [id number]           NM Technician       Jim Paulino, RICARDOT   Nurse              Lorraine Patton,  Interpreting        Richard Emerson MD,                     RN                 Physician           [facility], [facility] Physician Claribel Arredondo DO, [facility]   The procedure was explained in detail to the patient. Risks,  complications and alternative treatments were reviewed. Written consent  was obtained. Procedure Procedure Type:   Nuclear Stress Test:Exercise, NM MYOCARDIAL SPECT REST EXERCISE OR RX   Study location: [facility] - Nuclear Medicine   Indications: Pre-op clearance. Hospital Status: Outpatient. Height: 72 inches Weight: 210 pounds  Risk Factors   The patient risk factors include:Current/Recent(w/in 1 year) tobacco use,  treated and controlled hypercholesterolemia, treated and controlled  hypertension, family history of premature CAD, orally-treated diabetes  mellitus and dyslipidemia.    Conclusions   Summary  Small sized apical significant partial reversibility defect of moderate  intensity consistent with ischemia in the territory of the distal LAD . Left ventricular ejection fraction of 40 %. Overall findings represent a intermediate risk scan. Recommendation  Recommend cardiac catheterization depending on clinical appropriateness. Stress Protocols   Resting ECG  Normal sinus rhythm. Nonspecific T wave abnormalities. Frequent premature ventricular contractions. Resting HR:76 bpm     Resting BP:140/69 mmHg  Stress Protocol:Exercise - [place]  Peak HR:144 bpm                                  HR/BP product:[id number]  Peak BP:234/77 mmHg                              Max exercise: 7 METS  Predicted HR: 158 bpm  % of predicted HR: 91  Test duration:6 min and 16 sec  Reason for termination:Physiologic Maximum   ECG Findings  Nondiagnostic due to baseline abnormalities . Arrhythmias  Occasional premature ventricular contractions that improve with exercise. Symptoms  There was stress induced shortness of breath. Symptoms resolved with rest.  Denies any chest pain or discomfort. Complications  Procedure complication was none. Stress Interpretation  Normal EKG response with good exercise tolerance and no chest discomfort. Imaging Protocols   - One Day   Rest                          Stress   Isotope:Myoview/Tetrofosmin   Isotope: Myoview/Tetrofosmin  Isotope dose:10.1 mCi         Isotope dose:31.4 mCi  Administration Route:I.V.      Administration Route:I.V.  Date:08/08/2022 07:30         Date:08/08/2022 09:20                                 Technique:      Gated  Imaging Results    Stress ejection    Ejection fraction:40 %    EDV :167 ml    ESV :101 ml    Stroke volume :66 ml    LV mass :169 gr  Medical History   Additional Medical History   atorvastatin (LIPITOR) 20 MG tablet TAKE 1 TABLET BY MOUTH AT  BEDTIME  metFORMIN (GLUCOPHAGE) 1000 MG tablet Take 1,000 mg by mouth  daily (with breakfast)  glimepiride (AMARYL) 4 MG tablet daily  lisinopril-hydrochlorothiazide (PRINZIDE;ZESTORETIC) 20-12.5 MG  per tablet TAKE 1 TABLET BY MOUTH ONE TIME A DAY   Hospital Medications   Signatures   ------------------------------------------------------------------  Electronically signed by Eric Lo MD, [address] [facility], [address]  (Interpreting physician) on 08/08/2022 at 11:36  ------------------------------------------------------------------      CTA CARDIAC [facility] W CONTRAST    Result Date: 8/18/2022  EXAMINATION: CTA OF THE CORONARY ARTERIES 8/18/2022 8:55 am TECHNIQUE: Coronary CT angiogram was performed after the bolus administration of intravenous contrast with retrospective cardiac gating. Multiplanar reformatted images were created on a separate workstation by the radiologist. Automated exposure control, iterative reconstruction, and/or weight based adjustment of the mA/kV was utilized to reduce the radiation dose to as low as reasonably achievable. COMPARISON: None. HISTORY: ORDERING SYSTEM PROVIDED HISTORY: Abnormal nuclear stress test TECHNOLOGIST PROVIDED HISTORY: STAT Creatinine as needed:->Yes Reason for Exam: Abnormal nuclear stress test FINDINGS: Coronary arteries: Calcium score is 345, with a score of 14 in the left main, score of 184 in the LAD, score of 52 in the circumflex, score of 90 in the right coronary artery and a score of 5 in the PDA Right coronary artery: Origin of the right coronary artery is normal.  Scattered calcified and noncalcified plaque is seen throughout the right coronary artery. Right coronary artery gives rise to patent PDA branch and posterolateral branches. Scattered areas of mild narrowing are seen. Left coronary artery: Left main coronary artery is patent Scattered calcified and noncalcified plaque seen throughout the circumflex coronary artery with scattered areas of mild narrowing. Circumflex coronary artery gives rise to patent obtuse marginal branches.   Circumflex coronary artery is small distally, compatible with right dominant circulation Scattered calcified and noncalcified plaque seen throughout the left anterior descending coronary artery. Estimated maximal stenosis in the LAD is in the is seen at the junction of the proximal middle 3rd, of approximately 50%   Left anterior descending coronary artery is visualized to the cardiac apex. Patent diagonal branches are seen. Mediastinum: No intimal flap seen in aorta. Ascending aorta measures 3.4 cm. Trace aortic valve calcification is seen. No mitral valve calcification is seen. No central pulmonary embolus identified. No pericardial effusion. No pericardial calcification noted. Small mediastinal and hilar nodes are noted. Lungs/Pleura: No focal consolidation is seen in the lungs. There is scarring in the right lower lobe adjacent to prominent osteophyte. .  Punctate pulmonary nodule is seen along the minor fissure on the right, likely intrapulmonary lymph node by location. Upper Abdomen: Low attenuation is seen liver, compatible with fatty infiltration. Soft Tissues/Bones: Spurring is seen in the spine     Calcified and noncalcified plaque is seen throughout the coronary arteries, with estimated maximal stenosis of approximately 50% in the LAD. Calcium score 345.   This is in the 70th percentile         Discharge Exam:  See progress note from day of d/c    Disposition: home    Condition: stable    Discharge Medications:     Medication List        START taking these medications      rosuvastatin 40 MG tablet  Commonly known as: CRESTOR  Take 1 tablet by mouth nightly            CONTINUE taking these medications      aspirin 81 MG chewable tablet     glimepiride 4 MG tablet  Commonly known as: AMARYL     lisinopril-hydroCHLOROthiazide 20-12.5 MG per tablet  Commonly known as: PRINZIDE;ZESTORETIC     Magnesium 400 MG Tabs  Take 400 mg by mouth daily     vitamin B-12 500 MCG tablet  Commonly known as: CYANOCOBALAMIN     Xigduo XR 5-1000 MG Tb24  Generic drug: Dapagliflozin-metFORMIN HCl ER  Take 1 tablet PO BID            STOP taking

## 2022-08-23 NOTE — PROGRESS NOTES
[facility] Department   Phone: [phone]    Occupational Therapy    [x] Initial Evaluation            [] Daily Treatment Note         [x] Discharge Summary      Patient: Holly Queen   : 1960   MRN: [id number]   Date of Service:  2022    Admitting Diagnosis:  Unstable angina [facility])  Current Admission Summary: This is a 58 y.o. male with pertinent past medical history of hypertension, diabetes, CAD, high cholesterol who was brought in by family for shortness of breath which began acutely about 2 hours prior to arrival.  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 Coworkers reported he appeared pale. Associated with 2 days of left-sided mild aching chest pain. He has known CAD of LAD cardiac cath scheduled   Past Medical History:  has a past medical history of Diabetes mellitus ([address] Hyperlipidemia, and Hypertension. Past Surgical History:  has a past surgical history that includes joint replacement (Right) and joint replacement (Left). Discharge Recommendations: Holly Queen scored a  on the AM-PAC ADL Inpatient form. At this time, no further OT is recommended upon discharge due to patient at independent level. Recommend patient returns to prior setting with prior services.       DME Required For Discharge: No DME required    Precautions/Restrictions: no restrictions    Pre-Admission Information   Lives With: spouse                  Type of Home: house  Home Layout: one level  Home Access: level entry  Bathroom Layout: walker accessible, walk in shower  Bathroom Equipment: hand held shower head  Toilet Height: standard height  Home Equipment: rolling walker  Transfer Assistance: Independent without use of device  Ambulation Assistance:Independent without use of device  ADL Assistance: independent with all ADL's  IADL Assistance: independent with homemaking tasks  Active :        [x] Yes                 [] No  Hand Dominance: [] Left                 [x] Right  Current Employment: full time employment. 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 Pain: 0/10  Pain Interventions: not applicable        Activities of Daily Living  Basic Activities of Daily Living  Grooming: Independent  Upper Extremity Dressing: Independent  Lower Extremity Dressing: Independent  Toileting: Independent. Pt independent grooming standing at sink, able to retrieve all ADL items in room  Instrumental Activities of Daily Living  No IADL completed on this date. Functional Mobility  Bed Mobility  Supine to Sit: Independent  Sit to Supine: Independent  Comments:  Transfers  Sit to stand transfer:Independent  Stand to sit transfer: Independent  Bed / Chair equipment: no device  Toilet transfer equipment: standard toilet  Comments:  Functional Mobility:  Sitting Balance: Independent. Standing Balance: Independent. Functional Mobility: .   Independent  Functional Mobility Activity: retrieve items, to/from bathroom    Other Therapeutic Interventions    Functional Outcomes  AM-PAC Inpatient Daily Activity Raw Score: 24    Cognition  WFL  Orientation:    A&O x 4  Command Following:   [facility]     Education  Barriers To Learning: none  Patient Education: Patient educated on goals, OT role and benefits, plan of care, discharge recommendations  Learning Assessment:  Patient verbalized and demonstrates understanding    Assessment  Impairments Requiring Therapeutic Intervention: none - eval with same day discharge  Prognosis: good without need for therapy intervention  Clinical Assessment: Patient presenting at independent level for completion of required self care tasks for return to home. Eval with d/c at this time. No therapy services indicated. Safety Interventions: patient left in chair, call light within reach, and nurse notified    Plan  Frequency: Eval with same day discharge. No follow up required. Current Treatment Recommendations: Not applicable, evaluation completed with same day discharge. Goals  Patient eval with same day discharge. No goals set as patient is at baseline self care status.       Therapy Session Time     Individual Group Co-treatment   Time In    5645   Time Out    0910   Minutes    23        Timed Code Treatment Minutes:   8  Total Treatment Minutes:  23       Electronically Signed By: Staci Mendez OT

## 2022-08-23 NOTE — PROGRESS NOTES
CLINICAL PHARMACY NOTE: MEDS TO BEDS    Total # of Prescriptions Filled: 1   The following medications were delivered to the patient:  Rosuvastatin 40 mg    Additional Documentation:  Delivered to Patient wife=Signed  [place] CPhT

## 2022-08-23 NOTE — CARE COORDINATION
Discharge Planning Assessment  Readmission score 6%  Patient's chart reviewed  for discharge planning needs; admitted from home , A&O, independent and it appears that patient has minimal needs for discharge at this time. Discussed with patients nurse and requested that case management be notified if discharge needs are identified. Patient has active insurance with Refchanelle Owens is listed as the PCP    Case management will continue to follow progress and update discharge plan as needed.

## 2022-08-24 ENCOUNTER — HOSPITAL ENCOUNTER (OUTPATIENT)
Dept: CARDIAC CATH/INVASIVE PROCEDURES | Age: 62
Discharge: HOME | End: 2022-08-24

## 2022-08-24 ENCOUNTER — TELEPHONE (OUTPATIENT)
Dept: FAMILY MEDICINE CLINIC | Age: 62
End: 2022-08-24

## 2022-08-24 DIAGNOSIS — I25.10 CORONARY ARTERY DISEASE DUE TO TYPE 2 DIABETES MELLITUS (HCC): Primary | ICD-10-CM

## 2022-08-24 DIAGNOSIS — E11.59 CORONARY ARTERY DISEASE DUE TO TYPE 2 DIABETES MELLITUS (HCC): Primary | ICD-10-CM

## 2022-08-24 RX ORDER — CLOPIDOGREL BISULFATE 75 MG/1
75 TABLET ORAL DAILY
Qty: 90 TABLET | Refills: 3 | Status: SHIPPED | OUTPATIENT
Start: 2022-08-24

## 2022-08-24 NOTE — TELEPHONE ENCOUNTER
[facility] Transitions Initial Follow Up Call    Outreach made within 2 business days of discharge: Yes    Patient: Jacky Monroe Patient : 1960   MRN: [id number]  Reason for Admission: There are no discharge diagnoses documented for the most recent discharge. Discharge Date: 22       Spoke with: Nain Client    Discharge department/facility: [facility] Patient Contact:  Was patient able to fill all prescriptions: Yes  Was patient instructed to bring all medications to the follow-up visit: Yes  Is patient taking all medications as directed in the discharge summary? Yes  Does patient understand their discharge instructions: Yes  Does patient have questions or concerns that need addressed prior to 7-14 day follow up office visit: yes - no    Scheduled appointment with PCP within 7-14 days  patient declined appt as he will follow up with cardiology.     Follow Up  Future Appointments   Date Time Provider Les Eugene   2022 10:00 AM ESUEBIO Fisher - CNP [facility]   10/19/2022  7:30 AM Thompson Lang MD [facility] - DYD   2022  2:15 PM Vandana Falcon DO [facility]       Flako Trivedi LPN

## 2022-09-07 NOTE — PROGRESS NOTES
[address]     Outpatient Follow Up Note    CHIEF COMPLAINT / HPI: Hospital Follow Up secondary to coronary artery disease    Hospital record has been reviewed  Hospital Course progressed as follows per discharge summary:     8/22/22  Hospital Course:  Pt had scheduled LHC after abnormal stress test where he had PCI to LAD. Evelio Hurtado is 58 y.o. male who presents today for a routine follow up after a recent hospitalization related to the above mentioned issues. Subjective:   Since the time of discharge, the patient states his fatigue and lightheadedness has improved. 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 Mr Shilpi Bang says he was confused about his medications and has not been taking the aspirin since his procedure. He is not interested in cardiac rehab as he says he is too busy. He will use his exercise bike at home. Admits to using smokeless tobacco. Fasting blood sugars have been around 120, which he says is really good for him. Denies any issue with radial site, shortness of breath, palpitations, dizziness, or edema. He has noticed a new dull ache in the left side of his chest 8 times since his procedure and is not brought on by anything specific. With regard to medication therapy the patient has been compliant with prescribed regimen. They have tolerated therapy to date.      Past Medical History:   Diagnosis Date    Diabetes mellitus ([address]     Hyperlipidemia     Hypertension      Social History:    Social History     Tobacco Use   Smoking Status Never   Smokeless Tobacco Current    Types: Snuff     Current Medications:  Current Outpatient Medications   Medication Sig Dispense Refill    clopidogrel (PLAVIX) 75 MG tablet Take 1 tablet by mouth daily 90 tablet 3    rosuvastatin (CRESTOR) 40 MG tablet Take 1 tablet by mouth nightly 30 tablet 3    metoprolol succinate (TOPROL XL) 25 MG extended release tablet Take 1 tablet by mouth at bedtime 30 tablet 3    vitamin B-12 (CYANOCOBALAMIN) 500 MCG tablet Take 500 mcg by mouth daily      aspirin 81 MG chewable tablet Take 81 mg by mouth daily      metoprolol succinate (TOPROL XL) 25 MG extended release tablet Take 1 tablet by mouth at bedtime (Patient not taking: Reported on 8/22/2022) 30 tablet 3    Magnesium 400 MG TABS Take 400 mg by mouth daily 90 tablet 1    Dapagliflozin-metFORMIN HCl ER (XIGDUO XR) 5-1000 MG TB24 Take 1 tablet PO  tablet 1    glimepiride (AMARYL) 4 MG tablet daily  1    lisinopril-hydrochlorothiazide (PRINZIDE;ZESTORETIC) 20-12.5 MG per tablet TAKE 1 TABLET BY MOUTH ONE TIME A DAY  1     No current facility-administered medications for this visit. REVIEW OF SYSTEMS:   CONSTITUTIONAL: No major weight gain or loss, fatigue, weakness, night sweats or fever. There's been no change in energy level, sleep pattern, or activity level. HEENT: No new vision difficulties or ringing in the ears. RESPIRATORY: No new SOB, PND, orthopnea or cough. CARDIOVASCULAR: See HPI  GI: No nausea, vomiting, diarrhea, constipation, abdominal pain or changes in bowel habits. : No urinary frequency, urgency, incontinence hematuria or dysuria. SKIN: No cyanosis or skin lesions. MUSCULOSKELETAL: No new muscle or joint pain. NEUROLOGICAL: No syncope or TIA-like symptoms. PSYCHIATRIC: No anxiety, pain, insomnia or depression    Objective:   PHYSICAL EXAM:        VITALS:  /74 (Site: Left Upper Arm, Position: Sitting, Cuff Size: Medium Adult)   Pulse 67   Ht 6' (1.829 m)   Wt 211 lb 1.6 oz (95.8 kg)   SpO2 96%   BMI 28.63 kg/m²     CONSTITUTIONAL: Cooperative, no apparent distress, and appears well nourished / developed  NEUROLOGIC:  Awake and orientated to person, place and time. PSYCH: Calm affect.   SKIN: Warm and dry. + radial site c/d/I   HEENT: Sclera non-icteric, normocephalic, neck supple, no elevation of JVP, normal carotid pulses with no bruits and thyroid normal size.  LUNGS:  No increased work of breathing and clear to auscultation, no crackles or wheezing. CARDIOVASCULAR:  Regular rate and rhythm with no murmurs, gallops, rubs, or abnormal heart sounds, normal PMI. The apical impulses not displaced. Heart tones are crisp and normal                                                                 ABDOMEN:  Normal bowel sounds, non-distended and non-tender to palpation  EXT: No edema, no calf tenderness. Pulses are present bilaterally. DATA:    Lab Results   Component Value Date    ALT 17 08/23/2022    AST 17 08/23/2022    ALKPHOS 60 08/23/2022    BILITOT 0.4 08/23/2022     Lab Results   Component Value Date    CREATININE 1.0 08/23/2022    BUN 23 (H) 08/23/2022     08/23/2022    K 4.7 08/23/2022    CL 99 08/23/2022    CO2 29 08/23/2022     Lab Results   Component Value Date    TSH 1.41 07/29/2022     Lab Results   Component Value Date    WBC 7.0 08/23/2022    HGB 14.5 08/23/2022    HCT 44.0 08/23/2022    MCV 87.1 08/23/2022     08/23/2022     No components found for: CHLPL  Lab Results   Component Value Date    TRIG 80 03/08/2019    TRIG 84 01/17/2019     Lab Results   Component Value Date    HDL 34 (L) 03/08/2019    HDL 31 (L) 01/17/2019     Lab Results   Component Value Date    LDLCALC 67 03/08/2019    [address] 01/17/2019     Lab Results   Component Value Date    LABVLDL 16 03/08/2019    LABVLDL 17 01/17/2019     Radiology Review:  Pertinent images / reports were reviewed as a part of this visit and reveals the following:    Last Echo: 2/5/2019   Summary   -Normal left ventricle size, wall thickness and systolic function with an   estimated ejection fraction of 55-60%. -Normal diastolic function. -Mild mitral regurgitation.   -Mild tricuspid regurgitation with an estimated PASP of 35-40 mmHg.      Last Stress Test: 8/8/22   Summary    Small sized apical significant partial reversibility defect of moderate    intensity consistent with ischemia in the territory of the distal LAD . Left ventricular ejection fraction of 40 %. Overall findings represent a intermediate risk scan. Recommendation    Recommend cardiac catheterization depending on clinical appropriateness. Last Angiogram: 22  Anatomy:   LM-nml   LAD-prox 70%, mid 80%, distal 40%  Cx-nml  OM- nml  RCA-dom 10%  RPDA- nml  LVEF- 55%  LVG- nml  LVEDP- 3     Intervention  ~Successful PCI to LAD with 3.5x33 MALVIN. PD with 3.5x12 NC Excellent Result. Last EC22  Sinus rhythm with frequent Premature ventricular complexes  T wave abnormality, consider inferior ischemia    Assessment:      Diagnosis Orders   1. Coronary artery disease involving native coronary artery of native heart without angina pectoris   ~ denies angina. C/o dull ache in left chest since procedure   ~ [facility] 22 PCI to LAD  ~ GXT 22 small sized apical significant partial reversibility defect c/w ischemia in territory of distal LAD. EF 40%  ~ [facility] 2019 mild nonobstructive CAD        2. Mixed hyperlipidemia   ~ rosuvastatin 40 initiated in the hospital LIPID PANEL / AST / ALT       3. Essential hypertension   ~ Controlled; 136/74 in office today        4. LV Dysfunction          ~ appears compensated          ~ EF 55% per [facility] 22         ~ EF 40% on GXT 22         ~ EF 55-60% on echo 2019    5. DM         ~ managed by PCP    6. Tobacco abuse          ~ admits to using smokeless tobacco     Patient  is stable since hospital discharge. Plan:   Chest fasting lipids end of October   Call if you change your mind and you'd like cardiac rehab   Appointment scheduled with Dr Ramez Alva   Continue to monitor ache and call if it intensifies / does not resolve. Start taking baby aspirin in addition to plavix     I have addressed the patient's cardiac risk factors and adjusted pharmacologic treatment as needed.  In addition, I have reinforced the need for patient directed risk factor modification. Further evaluation will be based upon the patient's clinical course and testing results. All questions and concerns were addressed to the patient    The patient currently  is not smoking (uses smokeless tobacco). The risks related to smoking were reviewed with the patient. Products available for smoking cessation were discussed. Dual Antiplatelet therapy has been recommended / prescribed for this patient. Education conducted on adverse reactions including bleeding was discussed. The patient verbalizes understanding. Pt is on a BB  Pt is on an ace-i/ARB  Pt is on a statin      Saturated fat diet discussed  Exercise program discussed    Thank you for allowing to us to participate in the care of Williamshaka Aquino.       [address]  Documentation of today's visit sent to PCP

## 2022-09-08 ENCOUNTER — OFFICE VISIT (OUTPATIENT)
Dept: CARDIOLOGY CLINIC | Age: 62
End: 2022-09-08
Payer: COMMERCIAL

## 2022-09-08 VITALS
OXYGEN SATURATION: 96 % | BODY MASS INDEX: 28.59 KG/M2 | SYSTOLIC BLOOD PRESSURE: 136 MMHG | WEIGHT: 211.1 LBS | HEIGHT: 72 IN | DIASTOLIC BLOOD PRESSURE: 74 MMHG | HEART RATE: 67 BPM

## 2022-09-08 DIAGNOSIS — I25.10 CORONARY ARTERY DISEASE INVOLVING NATIVE CORONARY ARTERY OF NATIVE HEART WITHOUT ANGINA PECTORIS: Primary | ICD-10-CM

## 2022-09-08 DIAGNOSIS — I10 ESSENTIAL HYPERTENSION: ICD-10-CM

## 2022-09-08 DIAGNOSIS — E78.2 MIXED HYPERLIPIDEMIA: ICD-10-CM

## 2022-09-08 PROCEDURE — 99214 OFFICE O/P EST MOD 30 MIN: CPT | Performed by: NURSE PRACTITIONER

## 2022-10-16 NOTE — PROGRESS NOTES
Blanca Barton   58 y.o. male   1960    HPI:    Patient was last seen by me on 8/10/2022. Reason(s) for visit:   Chief Complaint   Patient presents with    Diabetes    Follow-up     CAD:  -S/P PCI of LAD on 2022. Updates:  -Patient stated that he feels \"much better\" after having had PCI. -He denied issues with DIALLO, chest pain.     Type II diabetes mellitus:  -Last A1c =      Lab Results   Component Value Date    LABA1C 8.0 2022    LABA1C 8.0 2022    LABA1C 8.7 2022       Labs Renal Latest Ref Rng & Units 10/19/2022 2022 2022 2022 8/3/2022   BUN 7 - 20 mg/dL 20 23(H) 23(H) 24(H) 20   Cr 0.8 - 1.3 mg/dL 1.1 1.0 1.1 1.0 1.2   K 3.5 - 5.1 mmol/L 5.2(H) 4.7 4.6 4.6 4.3   Na 136 - 145 mmol/L 140 137 137 138 143       Lab Results   Component Value Date/Time    LABMICR 6.50 2022 09:49 AM    LABMICR Not Indicated 2015 11:45 AM    LABCREA 63.8 2022 09:49 AM    MALBCR 101.9 2022 09:49 AM       Lab Results   Component Value Date    LDLCALC 57 10/19/2022     -Glucose readings (on average):   [x] Fastin-107  [] Lunchtime:   [] Dinnertime:   [] Hasn't checked glucose readings  -Glucose reading  [] Low:  [] Max:  -Neuropathy symptoms: [] Yes [x] No  -Gastroparesis symptoms: [] Yes [x] No   -Visual disturbances: [] Yes [x] No  -Eye exam:   -Last one -  [] Wears glasses/contact lenses   -Medication adherence: [] Yes [] No  -Other comments:     HTN:  BP Readings from Last 3 Encounters:   10/19/22 138/82   22 136/74   22 104/74     -Patient has not been checking BP readings regularly.   -Patient denied issues with frequent headache, chest pain, shortness of breath, palpitations, malaise, etc.    No Known Allergies    Current Outpatient Medications on File Prior to Visit   Medication Sig Dispense Refill    metoprolol succinate (TOPROL XL) 25 MG extended release tablet Take 1 tablet by mouth at bedtime 90 tablet 1    clopidogrel (PLAVIX) 75 MG tablet Take 1 tablet by mouth daily 90 tablet 3    rosuvastatin (CRESTOR) 40 MG tablet Take 1 tablet by mouth nightly 30 tablet 3    metoprolol succinate (TOPROL XL) 25 MG extended release tablet Take 1 tablet by mouth at bedtime 30 tablet 3    vitamin B-12 (CYANOCOBALAMIN) 500 MCG tablet Take 500 mcg by mouth daily      aspirin 81 MG chewable tablet Take 81 mg by mouth daily      Magnesium 400 MG TABS Take 400 mg by mouth daily 90 tablet 1    Dapagliflozin-metFORMIN HCl ER (XIGDUO XR) 5-1000 MG TB24 Take 1 tablet PO  tablet 1           glimepiride (AMARYL) 4 MG tablet daily  1     No current facility-administered medications on file prior to visit.         Family History   Problem Relation Age of Onset    Cancer Mother     Diabetes Father     Kidney Disease Father     Heart Attack Father     Heart Attack Brother        Social History     Tobacco Use    Smoking status: Never    Smokeless tobacco: Current     Types: Snuff   Substance Use Topics    Alcohol use: Yes     Comment: occassionaly        Lab Results   Component Value Date    WBC 7.0 08/23/2022    HGB 14.5 08/23/2022    HCT 44.0 08/23/2022    MCV 87.1 08/23/2022     08/23/2022         Chemistry        Component Value Date/Time     10/19/2022 1931    K 5.2 (H) 10/19/2022 1931    K 4.7 08/23/2022 0528    CL 99 10/19/2022 1931    CO2 27 10/19/2022 1931    BUN 20 10/19/2022 1931    CREATININE 1.1 10/19/2022 1931        Component Value Date/Time    CALCIUM 10.1 10/19/2022 1931    ALKPHOS 52 10/19/2022 1931    AST 21 10/19/2022 1931    ALT 19 10/19/2022 1931    BILITOT 0.3 10/19/2022 1931          Lab Results   Component Value Date    ALT 19 10/19/2022    AST 21 10/19/2022    ALKPHOS 52 10/19/2022    BILITOT 0.3 10/19/2022       Lab Results   Component Value Date    CHOL 115 10/19/2022    CHOL 117 03/08/2019    CHOL 119 01/17/2019     Lab Results   Component Value Date    TRIG 73 10/19/2022    TRIG 80 03/08/2019    TRIG 84 01/17/2019     Lab Results   Component Value Date    HDL 43 10/19/2022    HDL 34 (L) 03/08/2019    HDL 31 (L) 01/17/2019     Lab Results   Component Value Date    LDLCALC 57 10/19/2022    LDLCALC 67 03/08/2019    LDLCALC 71 01/17/2019     Lab Results   Component Value Date    LABVLDL 15 10/19/2022    LABVLDL 16 03/08/2019    LABVLDL 17 01/17/2019     No results found for: CHOLHDLRATIO      Review of Systems   Constitutional:  Negative for activity change, appetite change, fatigue, fever and unexpected weight change. HENT:  Negative for congestion, rhinorrhea, sinus pressure and trouble swallowing. Respiratory:  Negative for cough, chest tightness, shortness of breath and wheezing. Cardiovascular:  Negative for chest pain, palpitations and leg swelling. Gastrointestinal:  Negative for abdominal distention, abdominal pain, blood in stool, constipation, diarrhea, nausea and vomiting. Genitourinary:  Negative for dysuria, frequency and hematuria. Musculoskeletal:  Negative for arthralgias and back pain. Skin:  Negative for rash. Neurological:  Negative for dizziness, weakness, light-headedness, numbness and headaches. Wt Readings from Last 3 Encounters:   10/19/22 209 lb 3.2 oz (94.9 kg)   09/08/22 211 lb 1.6 oz (95.8 kg)   08/23/22 201 lb 11.2 oz (91.5 kg)       BP Readings from Last 3 Encounters:   10/19/22 138/82   09/08/22 136/74   08/23/22 104/74       Pulse Readings from Last 3 Encounters:   10/19/22 52   09/08/22 67   08/23/22 78       /82   Pulse 52   Temp 97.3 °F (36.3 °C)   Wt 209 lb 3.2 oz (94.9 kg)   SpO2 98%   BMI 28.37 kg/m²      Physical Exam  Vitals reviewed. Constitutional:       General: He is awake. He is not in acute distress. Appearance: He is overweight. He is not ill-appearing or diaphoretic. HENT:      Head: Normocephalic and atraumatic. No abrasion or masses.  Hair is normal.      Right Ear: External ear normal.      Left Ear: External ear normal.      Nose: Nose normal. Eyes:      General: Lids are normal. Gaze aligned appropriately. No scleral icterus. Right eye: No discharge. Left eye: No discharge. Extraocular Movements: Extraocular movements intact. Conjunctiva/sclera: Conjunctivae normal.   Neck:      Trachea: Phonation normal.   Cardiovascular:      Rate and Rhythm: Normal rate and regular rhythm. Pulmonary:      Effort: Pulmonary effort is normal. No respiratory distress. Breath sounds: No wheezing, rhonchi or rales. Abdominal:      General: Abdomen is flat. There is no distension. Palpations: Abdomen is soft. Musculoskeletal:         General: No deformity. Normal range of motion. Cervical back: Normal range of motion. No erythema. Right lower leg: No edema. Left lower leg: No edema. Skin:     Coloration: Skin is not cyanotic, jaundiced or pale. Findings: No abrasion, abscess, bruising, ecchymosis, erythema, signs of injury, laceration, lesion, petechiae, rash or wound. Neurological:      General: No focal deficit present. Mental Status: He is alert. Mental status is at baseline. GCS: GCS eye subscore is 4. GCS verbal subscore is 5. GCS motor subscore is 6. Cranial Nerves: No cranial nerve deficit, dysarthria or facial asymmetry. Motor: No weakness, tremor, atrophy or seizure activity. Coordination: Coordination normal.      Gait: Gait is intact. Psychiatric:         Attention and Perception: Attention and perception normal.         Mood and Affect: Mood and affect normal.         Speech: Speech normal.         Behavior: Behavior normal. Behavior is cooperative. Thought Content: Thought content normal.       Assessment/Plan:   Lex Frank was seen today for diabetes and follow-up. Diagnoses and all orders for this visit:    Coronary artery disease due to type 2 diabetes mellitus ([facility] [address]  Comments:  Continue current regiment. LDL goal <70.   Orders:  -     Lipid Panel; Future    Status post insertion of drug-eluting stent into left anterior descending (LAD) artery    Hypertension associated with type 2 diabetes mellitus ([facility] [address]  Comments: Will switch to telmisartan b/c this can more effectively control your BP and help with glycemic control. Orders:  -     telmisartan (MICARDIS) 80 MG tablet; Take 1 tablet by mouth nightly    Type 2 diabetes mellitus with microalbuminuria, without long-term current use of insulin (HCC)  Comments:  Continue current DM regiment. Will re-check A1c in 1-2 mo. Orders:  -     Comprehensive Metabolic Panel; Future  -     telmisartan (MICARDIS) 80 MG tablet; Take 1 tablet by mouth nightly    Hyperlipidemia associated with type 2 diabetes mellitus ([facility] [address]  -     Lipid Panel; Future    I reviewed the plan of care with the patient. Patient acknowledged understanding and agreed with plan of care overall. Medications Discontinued During This Encounter   Medication Reason    lisinopril-hydrochlorothiazide (PRINZIDE;ZESTORETIC) 20-12.5 MG per tablet Alternate therapy        General information on medications:  -When it comes to medications, whether with starting or adding a new medication or increasing the dose of a current medication, the benefits and risks have to always be considered and weighed over, especially if one is taking other medications as well. -There are no medications that have no side effects and that there is always a risk involved with taking a medication.    -If a side effect were to occur with starting a new medication or with increasing the dose of a current medication that either the medication can be totally discontinued altogether or simply decrease the dose of it and if this would be the case a follow-up appointment would be deemed necessary.    -The drug allergy list will then be updated with the corresponding side effect(s) if it's deemed to be a true 'drug allergy'.     -The most common adverse effects of medication(s) were addressed at today's visit.    -Lastly, the coverage status of a medication may vary from insurance to insurance and the only way to verify if the medication is covered is to send an actual prescription in.    -The drug formulary of each insurance changes without any warning or notification to the healthcare provider let alone the pharmacy.  -The cost of medications vary from insurance to insurance and the cost is always subject to change just like the drug formulary. Follow-up: Return in about 8 weeks (around 12/14/2022) for A1c check - diabetes. .     Patient was informed that if his or her symptoms worsen to follow up with me sooner or go to the nearest ER if the symptoms are very significant and warrant higher level of care. Regarding my note:  -This note was composed (by me only and not with assistance via a scribe) to the best of my knowledge and recollection of the encounter with the patient using one of my own customized note templates utilizing a combination of typing and dictating with the [address] [place] [address] speech recognition software. As a result, the note may possibly contain various errors (e.g. spelling, grammar, and non-sensible words/phrases/statements) despite reviewing the note prior to signing it for completion. Time spent includes some or all of the following, both face-to-face time and non face-to-face time, but is not limited to:  [x] Preparing to see the patient by reviewing medical records available (notes, labs, imaging, etc.) prior to seeing the patient. [x] Obtaining and/or reviewing the history from the patient. [x] Performing a medically appropriate examination. [x] Ordering of relevant lab work, medications, referrals, or procedures. [x] Discussing patient's medical issues and formulating an assessment and plan. [x] Reviewing plan of care with patient. Answering any questions or concerns.    [x] Documentation within the electronic health record (EHR)  [] Reviewing records of history relevant to patient's issues after seeing the patient. [] Discussion or coordination of care with other health care professionals  [x] Other: length office visit - 30 minutes.  -I spent a significant amount of time discussing various issues as noted above and also with formulating a treatment plan for each specific issue. Patient was given the opportunity to ask me any questions and address any concerns/issues. I also reviewed lab work (if available) as well as prior notes from PCP and/or other specialists if available. Shadi Hodgson M.D.   [address] [place] [address]    Electronically signed by Drew Reyes MD M.D. on 10/19/2022 at 9:48 PM.

## 2022-10-18 RX ORDER — METOPROLOL SUCCINATE 25 MG/1
25 TABLET, EXTENDED RELEASE ORAL NIGHTLY
Qty: 90 TABLET | Refills: 1 | Status: SHIPPED | OUTPATIENT
Start: 2022-10-18

## 2022-10-18 NOTE — TELEPHONE ENCOUNTER
Last OV: 8/22/22  Last labs:    Beta-Blockers Protocol Passed 10/18/2022 06:43 AM   Protocol Details  Last Pulse reading greater than 50 recorded within past year    Visit with authorizing provider in past 9 months or upcoming 90 days        Appt scheduled :  11/8/22    Changed to 25 mg    PSC started at some point  25 mg HS

## 2022-10-19 ENCOUNTER — OFFICE VISIT (OUTPATIENT)
Dept: FAMILY MEDICINE CLINIC | Age: 62
End: 2022-10-19
Payer: COMMERCIAL

## 2022-10-19 VITALS
OXYGEN SATURATION: 98 % | DIASTOLIC BLOOD PRESSURE: 82 MMHG | WEIGHT: 209.2 LBS | BODY MASS INDEX: 28.37 KG/M2 | HEART RATE: 52 BPM | SYSTOLIC BLOOD PRESSURE: 138 MMHG | TEMPERATURE: 97.3 F

## 2022-10-19 DIAGNOSIS — E11.29 TYPE 2 DIABETES MELLITUS WITH MICROALBUMINURIA, WITHOUT LONG-TERM CURRENT USE OF INSULIN (HCC): ICD-10-CM

## 2022-10-19 DIAGNOSIS — I25.10 CORONARY ARTERY DISEASE DUE TO TYPE 2 DIABETES MELLITUS (HCC): Primary | ICD-10-CM

## 2022-10-19 DIAGNOSIS — E11.59 CORONARY ARTERY DISEASE DUE TO TYPE 2 DIABETES MELLITUS (HCC): Primary | ICD-10-CM

## 2022-10-19 DIAGNOSIS — E11.69 HYPERLIPIDEMIA ASSOCIATED WITH TYPE 2 DIABETES MELLITUS (HCC): ICD-10-CM

## 2022-10-19 DIAGNOSIS — Z95.5 STATUS POST INSERTION OF DRUG-ELUTING STENT INTO LEFT ANTERIOR DESCENDING (LAD) ARTERY: ICD-10-CM

## 2022-10-19 DIAGNOSIS — E78.5 HYPERLIPIDEMIA ASSOCIATED WITH TYPE 2 DIABETES MELLITUS (HCC): ICD-10-CM

## 2022-10-19 DIAGNOSIS — E11.59 HYPERTENSION ASSOCIATED WITH TYPE 2 DIABETES MELLITUS (HCC): ICD-10-CM

## 2022-10-19 DIAGNOSIS — I15.2 HYPERTENSION ASSOCIATED WITH TYPE 2 DIABETES MELLITUS (HCC): ICD-10-CM

## 2022-10-19 DIAGNOSIS — I25.10 CORONARY ARTERY DISEASE DUE TO TYPE 2 DIABETES MELLITUS (HCC): ICD-10-CM

## 2022-10-19 DIAGNOSIS — R80.9 TYPE 2 DIABETES MELLITUS WITH MICROALBUMINURIA, WITHOUT LONG-TERM CURRENT USE OF INSULIN (HCC): ICD-10-CM

## 2022-10-19 DIAGNOSIS — E11.59 CORONARY ARTERY DISEASE DUE TO TYPE 2 DIABETES MELLITUS (HCC): ICD-10-CM

## 2022-10-19 LAB
A/G RATIO: 2.5 (ref 1.1–2.2)
ALBUMIN SERPL-MCNC: 5 G/DL (ref 3.4–5)
ALP BLD-CCNC: 52 U/L (ref 40–129)
ALT SERPL-CCNC: 19 U/L (ref 10–40)
ANION GAP SERPL CALCULATED.3IONS-SCNC: 14 MMOL/L (ref 3–16)
AST SERPL-CCNC: 21 U/L (ref 15–37)
BILIRUB SERPL-MCNC: 0.3 MG/DL (ref 0–1)
BUN BLDV-MCNC: 20 MG/DL (ref 7–20)
CALCIUM SERPL-MCNC: 10.1 MG/DL (ref 8.3–10.6)
CHLORIDE BLD-SCNC: 99 MMOL/L (ref 99–110)
CHOLESTEROL, TOTAL: 115 MG/DL (ref 0–199)
CO2: 27 MMOL/L (ref 21–32)
CREAT SERPL-MCNC: 1.1 MG/DL (ref 0.8–1.3)
GFR SERPL CREATININE-BSD FRML MDRD: >60 ML/MIN/{1.73_M2}
GLUCOSE BLD-MCNC: 127 MG/DL (ref 70–99)
HDLC SERPL-MCNC: 43 MG/DL (ref 40–60)
LDL CHOLESTEROL CALCULATED: 57 MG/DL
POTASSIUM SERPL-SCNC: 5.2 MMOL/L (ref 3.5–5.1)
SODIUM BLD-SCNC: 140 MMOL/L (ref 136–145)
TOTAL PROTEIN: 7 G/DL (ref 6.4–8.2)
TRIGL SERPL-MCNC: 73 MG/DL (ref 0–150)
VLDLC SERPL CALC-MCNC: 15 MG/DL

## 2022-10-19 PROCEDURE — 99214 OFFICE O/P EST MOD 30 MIN: CPT | Performed by: FAMILY MEDICINE

## 2022-10-19 PROCEDURE — 3052F HG A1C>EQUAL 8.0%<EQUAL 9.0%: CPT | Performed by: FAMILY MEDICINE

## 2022-10-19 RX ORDER — TELMISARTAN 80 MG/1
80 TABLET ORAL NIGHTLY
Qty: 90 TABLET | Refills: 0 | Status: SHIPPED | OUTPATIENT
Start: 2022-10-19

## 2022-10-19 ASSESSMENT — ENCOUNTER SYMPTOMS
NAUSEA: 0
CHEST TIGHTNESS: 0
COUGH: 0
TROUBLE SWALLOWING: 0
DIARRHEA: 0
RHINORRHEA: 0
BLOOD IN STOOL: 0
SINUS PRESSURE: 0
WHEEZING: 0
BACK PAIN: 0
ABDOMINAL PAIN: 0
ABDOMINAL DISTENTION: 0
CONSTIPATION: 0
SHORTNESS OF BREATH: 0
VOMITING: 0

## 2022-11-07 PROBLEM — Z72.0 TOBACCO USE: Status: ACTIVE | Noted: 2022-11-07

## 2022-11-07 NOTE — PROGRESS NOTES
2022    PATIENT: Blanca Barton  : 1960    Primary Care Provider:   Sherly Bush MD  V:[phone]  O:[phone]    Reason for evaluation:   Chief Complaint   Patient presents with    Hyperlipidemia    Coronary Artery Disease     History of present illness:   Mr. Blanca Barton is a 58 y.o. male patient, here for cardiovascular follow up regarding 2022 PCI to LAD. He was last seen by me in the office on a preoperative basis and stress test led to a follow-up coronary CTA, reporting LAD disease to have progressed to 50%. Ultimately, he proceeded to ER reporting episodes of left-sided chest and arm sensations described as a dull ache. He denies anginal concern since intervention and is able to work without symptoms. 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 No persistent concerns. He states he has dip tobacco since age of 23 and been able to quit for several months at a time. He cites his grandchildren as reason to want to quit now. He has had no bleeding on dual antiplatelet therapy. He has noticed increased urination since starting Xigduo. Reports some positional lightheadedness. No palpitations or syncope.     Medical History:      Diagnosis Date    Diabetes mellitus ([address]     Hyperlipidemia     Hypertension        Surgical History:      Procedure Laterality Date    JOINT REPLACEMENT Right     knee    JOINT REPLACEMENT Left     HIP       Social History:  Social History     Socioeconomic History    Marital status:      Spouse name: Chyna    Number of children: 4    Years of education: 12    Highest education level: Not on file   Occupational History    Occupation: street maintenance   Tobacco Use    Smoking status: Never    Smokeless tobacco: Current     Types: Snuff   Vaping Use    Vaping Use: Never used   Substance and Sexual Activity    Alcohol use: Yes     Comment: occassionaly    Drug use: Yes     Types: Marijuana Dietra Due)     Comment: medical gummys for pain    Sexual activity: Yes     Partners: Female   Other Topics Concern    Not on file   Social History Narrative    Not on file     Social Determinants of Health     Financial Resource Strain: Not on file   Food Insecurity: Not on file   Transportation Needs: Not on file   Physical Activity: Not on file   Stress: Not on file   Social Connections: Not on file   Intimate Partner Violence: Not on file   Housing Stability: Not on file        Family History:  No evidence for sudden cardiac death or premature CAD. Problem Relation Age of Onset    Cancer Mother     Diabetes Father     Kidney Disease Father     Heart Attack Father     Heart Attack Brother        Medications:  [x] Medications and dosages reviewed. Prior to Admission medications    Medication Sig Start Date End Date Taking? Authorizing Provider   telmisartan (MICARDIS) 80 MG tablet Take 1 tablet by mouth nightly 10/19/22  Yes Mayela Light MD   metoprolol succinate (TOPROL XL) 25 MG extended release tablet Take 1 tablet by mouth at bedtime 10/18/22  Yes Renelle Stage, DO   clopidogrel (PLAVIX) 75 MG tablet Take 1 tablet by mouth daily 8/24/22  Yes Renelle Stage, DO   rosuvastatin (CRESTOR) 40 MG tablet Take 1 tablet by mouth nightly 8/23/22  Yes Kenneth Acharya MD   vitamin B-12 (CYANOCOBALAMIN) 500 MCG tablet Take 500 mcg by mouth daily   Yes Historical Provider, MD   aspirin 81 MG chewable tablet Take 81 mg by mouth daily   Yes Historical Provider, MD   Magnesium 400 MG TABS Take 400 mg by mouth daily 8/12/22  Yes Mayela Light MD   Dapagliflozin-metFORMIN HCl ER (XIGDUO XR) 5-1000 MG TB24 Take 1 tablet PO BID 8/10/22  Yes Mayela Light MD   glimepiride (AMARYL) 4 MG tablet daily 1/8/19  Yes Historical Provider, MD   atorvastatin (LIPITOR) 20 MG tablet TAKE 1 TABLET BY MOUTH AT BEDTIME 6/29/22 8/23/22  Historical Provider, MD       Allergies:  Patient has no known allergies.      Review of Systems:    [x]Full ROS obtained and negative except as mentioned in HPI    Physical Examination:    /62 (Site: Right Upper Arm, Position: Sitting, Cuff Size: Medium Adult)   Pulse 93   Ht 6' (1.829 m)   Wt 213 lb 11.2 oz (96.9 kg)   SpO2 97%   BMI 28.98 kg/m²   Wt Readings from Last 3 Encounters:   11/08/22 213 lb 11.2 oz (96.9 kg)   10/19/22 209 lb 3.2 oz (94.9 kg)   09/08/22 211 lb 1.6 oz (95.8 kg)       GENERAL: Well developed, well nourished, no acute distress  NEUROLOGICAL: Alert and oriented x3  PSYCH: Normal mood and affect   SKIN: Warm and dry, without lesions  HEENT: Normocephalic, atraumatic, Sclera non-icteric, mucous membranes moist  NECK: supple, JVP normal, thyroid not enlarged   CAROTID: Normal upstroke, no bruits  CARDIAC: Normal PMI, regular rate and rhythm, normal S1S2, no murmur, rub  RESPIRATORY: Normal respiratory effort, clear to auscultation bilaterally  EXTREMITIES: No cyanosis, clubbing or edema, palpable pulses bilaterally   MUSCULOSKELETAL: No joint swelling or tenderness, no chest wall tenderness  GASTROINTESTINAL:  soft, non-tender, no bruit    Labs:  Lab Review   Orders Only on 10/19/2022   Component Date Value    Cholesterol, Total 10/19/2022 115     Triglycerides 10/19/2022 73     HDL 10/19/2022 43     LDL Calculated 10/19/2022 57     VLDL Cholesterol Calcula* 10/19/2022 15     Sodium 10/19/2022 140     Potassium 10/19/2022 5.2 (A)     Chloride 10/19/2022 99     CO2 10/19/2022 27     Anion Gap 10/19/2022 14     Glucose 10/19/2022 127 (A)     BUN 10/19/2022 20     Creatinine 10/19/2022 1.1     Est, Glom Filt Rate 10/19/2022 >60     Calcium 10/19/2022 10.1     Total Protein 10/19/2022 7.0     Albumin 10/19/2022 5.0     Albumin/Globulin Ratio 10/19/2022 2.5 (A)     Total Bilirubin 10/19/2022 0.3     Alkaline Phosphatase 10/19/2022 52     ALT 10/19/2022 19     AST 10/19/2022 21      Imaging:  I have reviewed the below testing personally:    Stress Test: 8/8/22   Summary Small sized apical significant partial reversibility defect of moderate    intensity consistent with ischemia in the territory of the distal LAD . Left ventricular ejection fraction of 40 %. Overall findings represent a intermediate risk scan. Recommendation    Recommend cardiac catheterization depending on clinical appropriateness. 1/17/19  Left Heart Cath  Dominance : Right      LM: short, bifurcating, MLI     LAD: 25-30% eccentric pLAD mildly calcified  just before first large septal  and large first diagonal ; otherwise mild plaquing distally      LCx: large OM1 without disease; MLI     RCA: large, MLI     LVEDP: 10 mmHg   LVEF: 55%      Cardiac Cath LVG, PCI 8/23/22 [facility])  Anatomy:   LM-nml   LAD-prox 70%, mid 80%, distal 40%  Cx-nml  OM- nml  RCA-dom 10%  RPDA- nml  LVEF- 55%  LVG- nml  LVEDP- 3     Intervention  ~Successful PCI to LAD with 3.5x33 MALVIN. PD with 3.5x12 NC Excellent Result. ECHO 2/5/19   Summary   -Normal left ventricle size, wall thickness and systolic function with an   estimated ejection fraction of 55-60%. -Normal diastolic function. -Mild mitral regurgitation.   -Mild tricuspid regurgitation with an estimated PASP of 35-40 mmHg. Impression/Recommendations    Mr. Tressa Holley is a 58 y.o. male patient    CAD: MALVIN - proximal to mid LAD 8/2022; mild residual disease   Diabetes Mellitus, noninsulin dependent, suboptimal (A1C 8.0, 8/2022)  Hyperlipidemia, controlled (10/2022:  TG 73 HDL 43 LDL 57)   Hypertension, controlled    Tobacco abuse      [facility] has returned to baseline, without angina, since proximal LAD PCI three months ago. Recommend longer term DAPT at this time. Stressed the importance of quitting all tobacco products. He is agreeable to Vascular screen. Continue high intensity statin, beta blocker, and ARB. Follow up August 2023.        Orders Placed This Encounter   Procedures    US ABDOMINAL AORTA LIMITED     This procedure can be scheduled via [employer]. Access your [facility] account by visiting [email or web address]. Standing Status:   Future     Standing Expiration Date:   11/8/2023     Order Specific Question:   Reason for exam:     Answer:   tobacco use, hld, dm    VL DUP CAROTID BILATERAL     Standing Status:   Future     Standing Expiration Date:   11/8/2023     Order Specific Question:   Reason for exam:     Answer:   hld, tobacco use, cad       Thank you for allowing me to participate in the care of your patient. Please do not hesitate to call. Melanie Zavala D.O., [facility]  Interventional Cardiology     o: [phone]  [address] [facility] [address], [address] [facility]    NOTE:  This report was transcribed using voice recognition software. Every effort was made to ensure accuracy; however, inadvertent computerized transcription errors may be present. Scribe's Attestation: This note was scribed in the presence of Dr. Candice Atkinson DO by Ita Hayden, RN.    I, Melanie Zavala, have personally performed the services described in this documentation as scribed by Quentin Neri RN in my presence, and it is both accurate and complete. An electronic signature was used to authenticate this note.

## 2022-11-08 ENCOUNTER — OFFICE VISIT (OUTPATIENT)
Dept: CARDIOLOGY CLINIC | Age: 62
End: 2022-11-08
Payer: COMMERCIAL

## 2022-11-08 VITALS
HEART RATE: 93 BPM | DIASTOLIC BLOOD PRESSURE: 62 MMHG | BODY MASS INDEX: 28.94 KG/M2 | WEIGHT: 213.7 LBS | HEIGHT: 72 IN | SYSTOLIC BLOOD PRESSURE: 138 MMHG | OXYGEN SATURATION: 97 %

## 2022-11-08 DIAGNOSIS — I25.10 CORONARY ARTERY DISEASE INVOLVING NATIVE CORONARY ARTERY OF NATIVE HEART WITHOUT ANGINA PECTORIS: Primary | ICD-10-CM

## 2022-11-08 DIAGNOSIS — Z72.0 TOBACCO USE: ICD-10-CM

## 2022-11-08 DIAGNOSIS — E78.5 HYPERLIPIDEMIA, UNSPECIFIED HYPERLIPIDEMIA TYPE: ICD-10-CM

## 2022-11-08 DIAGNOSIS — Z82.49 FAMILY HISTORY OF CARDIOVASCULAR DISEASE: ICD-10-CM

## 2022-11-08 DIAGNOSIS — E11.59 HYPERTENSION ASSOCIATED WITH TYPE 2 DIABETES MELLITUS (HCC): ICD-10-CM

## 2022-11-08 DIAGNOSIS — I15.2 HYPERTENSION ASSOCIATED WITH TYPE 2 DIABETES MELLITUS (HCC): ICD-10-CM

## 2022-11-08 PROCEDURE — 99214 OFFICE O/P EST MOD 30 MIN: CPT | Performed by: INTERNAL MEDICINE

## 2022-11-08 PROCEDURE — 3078F DIAST BP <80 MM HG: CPT | Performed by: INTERNAL MEDICINE

## 2022-11-08 PROCEDURE — 3052F HG A1C>EQUAL 8.0%<EQUAL 9.0%: CPT | Performed by: INTERNAL MEDICINE

## 2022-11-08 PROCEDURE — 3074F SYST BP LT 130 MM HG: CPT | Performed by: INTERNAL MEDICINE

## 2022-11-08 NOTE — PATIENT INSTRUCTIONS
Make sure you are staying hydrated, drinking water as your primary source of fluid intake  Goal is to eliminate tobacco completely     Recommend vascular screening at some point in the future (neck arteries and abdominal aorta)    Follow up in August 2023    Call with questions or concerns

## 2022-12-07 ENCOUNTER — HOSPITAL ENCOUNTER (OUTPATIENT)
Dept: ULTRASOUND IMAGING | Age: 62
Discharge: HOME | End: 2022-12-07
Payer: COMMERCIAL

## 2022-12-07 ENCOUNTER — HOSPITAL ENCOUNTER (OUTPATIENT)
Dept: VASCULAR LAB | Age: 62
Discharge: HOME | End: 2022-12-07
Payer: COMMERCIAL

## 2022-12-07 DIAGNOSIS — Z72.0 TOBACCO USE: ICD-10-CM

## 2022-12-07 DIAGNOSIS — I25.10 CORONARY ARTERY DISEASE INVOLVING NATIVE CORONARY ARTERY OF NATIVE HEART WITHOUT ANGINA PECTORIS: ICD-10-CM

## 2022-12-07 DIAGNOSIS — E78.5 HYPERLIPIDEMIA, UNSPECIFIED HYPERLIPIDEMIA TYPE: ICD-10-CM

## 2022-12-07 PROCEDURE — 76775 US EXAM ABDO BACK WALL LIM: CPT

## 2022-12-07 PROCEDURE — 93880 EXTRACRANIAL BILAT STUDY: CPT

## 2022-12-12 NOTE — RESULT ENCOUNTER NOTE
Fauzia Paredes with carotid US results  Discussed tobacco cessation and informed him LDL controlled to target <70

## 2022-12-17 NOTE — PROGRESS NOTES
emre Gaston   58 y.o. male   1960    HPI:    Patient was last seen by me on 10/19/2022. During our last office visit:   -I switched his BP med from lisinopril-hctz to telmisartan more so for better glycemic control. Reason(s) for visit:   Chief Complaint   Patient presents with    Diabetes    Medication Refill    Follow-up     Pt would like to have labs drawn today. Sinus Problem     Pt reported that he feels like he has a sinus infection. CAD:  -S/P PCI of LAD on 8/23/2022. Updates:  -Patient stated that he feels overall \"much better\" after having had PCI. -He denied issues with DIALLO, chest pain.  -Patient chews tobacco.    Type II diabetes mellitus:  -Last A1c =    Lab Results   Component Value Date    LABA1C 8.0 08/23/2022    LABA1C 8.0 08/22/2022    LABA1C 8.7 07/29/2022       Labs Renal Latest Ref Rng & Units 10/19/2022 8/23/2022 8/22/2022 8/22/2022 8/3/2022   BUN 7 - 20 mg/dL 20 23(H) 23(H) 24(H) 20   Cr 0.8 - 1.3 mg/dL 1.1 1.0 1.1 1.0 1.2   K 3.5 - 5.1 mmol/L 5.2(H) 4.7 4.6 4.6 4.3   Na 136 - 145 mmol/L 140 137 137 138 143       Lab Results   Component Value Date/Time    LABMICR 6.50 07/29/2022 09:49 AM    LABMICR Not Indicated 05/26/2015 11:45 AM    LABCREA 63.8 07/29/2022 09:49 AM    MALBCR 101.9 07/29/2022 09:49 AM       Lab Results   Component Value Date    LDLCALC 57 10/19/2022     -Glucose readings (on average):   [] Fasting:   [] Lunchtime:   [] Dinnertime:   [x] Hasn't checked glucose readings  -Glucose reading  [] Low:  [] Max:  -Neuropathy symptoms: [] Yes [x] No  -Gastroparesis symptoms: [] Yes [x] No   -Visual disturbances: [] Yes [x] No  -Eye exam:   -Last one -  [] Wears glasses/contact lenses   -Medication adherence: [] Yes [] No  -Other comments:     URI:  -6 day history of post-nasal drip. He reported of difficulty expectorating. He reported that he's overall getting better. He works outdoors. Of note, he went to the [employer] recently.     No Known Allergies    Current Outpatient Medications on File Prior to Visit   Medication Sig Dispense Refill    magnesium oxide (MAG-OX) 400 MG tablet TAKE 1 TABLET BY MOUTH EVERY DAY      metoprolol succinate (TOPROL XL) 25 MG extended release tablet Take 1 tablet by mouth at bedtime 90 tablet 1    clopidogrel (PLAVIX) 75 MG tablet Take 1 tablet by mouth daily 90 tablet 3    rosuvastatin (CRESTOR) 40 MG tablet Take 1 tablet by mouth nightly 30 tablet 3    vitamin B-12 (CYANOCOBALAMIN) 500 MCG tablet Take 500 mcg by mouth daily      aspirin 81 MG chewable tablet Take 81 mg by mouth daily      [DISCONTINUED] atorvastatin (LIPITOR) 20 MG tablet TAKE 1 TABLET BY MOUTH AT BEDTIME      glimepiride (AMARYL) 4 MG tablet daily  1     No current facility-administered medications on file prior to visit.         Family History   Problem Relation Age of Onset    Cancer Mother     Diabetes Father     Kidney Disease Father     Heart Attack Father     Heart Attack Brother        Social History     Tobacco Use    Smoking status: Never    Smokeless tobacco: Current     Types: Snuff   Substance Use Topics    Alcohol use: Yes     Comment: occassionaly        Lab Results   Component Value Date    WBC 7.0 08/23/2022    HGB 14.5 08/23/2022    HCT 44.0 08/23/2022    MCV 87.1 08/23/2022     08/23/2022         Chemistry        Component Value Date/Time     10/19/2022 1931    K 5.2 (H) 10/19/2022 1931    K 4.7 08/23/2022 0528    CL 99 10/19/2022 1931    CO2 27 10/19/2022 1931    BUN 20 10/19/2022 1931    CREATININE 1.1 10/19/2022 1931        Component Value Date/Time    CALCIUM 10.1 10/19/2022 1931    ALKPHOS 52 10/19/2022 1931    AST 21 10/19/2022 1931    ALT 19 10/19/2022 1931    BILITOT 0.3 10/19/2022 1931          Lab Results   Component Value Date    ALT 19 10/19/2022    AST 21 10/19/2022    ALKPHOS 52 10/19/2022    BILITOT 0.3 10/19/2022       Lab Results   Component Value Date    CHOL 115 10/19/2022    CHOL 117 03/08/2019    CHOL 119 01/17/2019     Lab Results   Component Value Date    TRIG 73 10/19/2022    TRIG 80 03/08/2019    TRIG 84 01/17/2019     Lab Results   Component Value Date    HDL 43 10/19/2022    HDL 34 (L) 03/08/2019    HDL 31 (L) 01/17/2019     Lab Results   Component Value Date    LDLCALC 57 10/19/2022    LDLCALC 67 03/08/2019    LDLCALC 71 01/17/2019     Lab Results   Component Value Date    LABVLDL 15 10/19/2022    LABVLDL 16 03/08/2019    LABVLDL 17 01/17/2019     No results found for: CHOLHDLRATIO      Review of Systems   Constitutional:  Positive for activity change and fatigue. Negative for appetite change, fever and unexpected weight change. HENT:  Positive for congestion, postnasal drip, rhinorrhea and sinus pressure. Negative for trouble swallowing. Respiratory:  Positive for cough. Negative for chest tightness, shortness of breath and wheezing. Cardiovascular:  Negative for chest pain, palpitations and leg swelling. Gastrointestinal:  Negative for abdominal distention, abdominal pain, blood in stool, constipation, diarrhea, nausea and vomiting. Genitourinary:  Negative for dysuria, frequency and hematuria. Musculoskeletal:  Negative for arthralgias and back pain. Skin:  Negative for rash. Neurological:  Negative for dizziness, weakness, light-headedness, numbness and headaches. Psychiatric/Behavioral:  Negative for agitation, decreased concentration, dysphoric mood, sleep disturbance and suicidal ideas. The patient is not nervous/anxious. Wt Readings from Last 3 Encounters:   12/19/22 208 lb 9.6 oz (94.6 kg)   11/08/22 213 lb 11.2 oz (96.9 kg)   10/19/22 209 lb 3.2 oz (94.9 kg)       BP Readings from Last 3 Encounters:   12/19/22 118/80   11/08/22 138/62   10/19/22 138/82       Pulse Readings from Last 3 Encounters:   11/08/22 93   10/19/22 52   09/08/22 67       /80   Temp 98 °F (36.7 °C)   Wt 208 lb 9.6 oz (94.6 kg)   BMI 28.29 kg/m²      Physical Exam  Vitals reviewed. Constitutional:       General: He is awake. He is not in acute distress. Appearance: He is overweight. He is not ill-appearing or diaphoretic. HENT:      Head: Normocephalic and atraumatic. No abrasion or masses. Hair is normal.      Right Ear: External ear normal.      Left Ear: External ear normal.      Nose: Nose normal.   Eyes:      General: Lids are normal. Gaze aligned appropriately. No scleral icterus. Right eye: No discharge. Left eye: No discharge. Extraocular Movements: Extraocular movements intact. Conjunctiva/sclera: Conjunctivae normal.   Neck:      Trachea: Phonation normal.   Cardiovascular:      Rate and Rhythm: Normal rate and regular rhythm. Pulmonary:      Effort: Pulmonary effort is normal. No respiratory distress. Breath sounds: No wheezing, rhonchi or rales. Abdominal:      General: Abdomen is flat. There is no distension. Palpations: Abdomen is soft. Musculoskeletal:         General: No deformity. Normal range of motion. Cervical back: Normal range of motion. No erythema. Right lower leg: No edema. Left lower leg: No edema. Skin:     Coloration: Skin is not cyanotic, jaundiced or pale. Findings: No abrasion, abscess, bruising, ecchymosis, erythema, signs of injury, laceration, lesion, petechiae, rash or wound. Neurological:      General: No focal deficit present. Mental Status: He is alert. Mental status is at baseline. GCS: GCS eye subscore is 4. GCS verbal subscore is 5. GCS motor subscore is 6. Cranial Nerves: No cranial nerve deficit, dysarthria or facial asymmetry. Motor: No weakness, tremor, atrophy or seizure activity. Coordination: Coordination normal.      Gait: Gait is intact.    Psychiatric:         Attention and Perception: Attention and perception normal.         Mood and Affect: Mood and affect normal.         Speech: Speech normal.         Behavior: Behavior normal. Behavior is cooperative. Thought Content: Thought content normal.        Assessment/Plan:   Keshawn Armendariz was seen today for diabetes, medication refill, follow-up and sinus problem. Diagnoses and all orders for this visit:    Hypertension associated with type 2 diabetes mellitus ([facility]  Comments:  BP is controlled. DM is not at goal. Advised to check glucose more often. Discussed about adding GLP-1 or something else if repeat A1c > 7.0. Orders:  -     telmisartan (MICARDIS) 80 MG tablet; Take 1 tablet by mouth nightly  -     Comprehensive Metabolic Panel; Future  -     Hemoglobin A1C; Future  -     Hemoglobin A1C  -     Comprehensive Metabolic Panel    Type 2 diabetes mellitus with microalbuminuria, without long-term current use of insulin (HCC)  Comments:  Continue current DM regiment. Will re-check A1c in 1-2 mo. Orders:  -     telmisartan (MICARDIS) 80 MG tablet; Take 1 tablet by mouth nightly  -     Comprehensive Metabolic Panel; Future  -     Comprehensive Metabolic Panel    Coronary artery disease due to type 2 diabetes mellitus ([facility]  Comments: Will continue current regiment. Will check lipids with A1c in 3 mo. Orders:  -     Dapagliflozin-metFORMIN HCl ER (XIGDUO XR) 5-1000 MG TB24; Take 1 tablet PO BID    Chronic nasal congestion  Comments: Will do a trial of nasal spray. Orders:  -     azelastine (ASTELIN) 0.1 % nasal spray; 2 sprays by Nasal route 2 times daily Use in each nostril as directed    Tobacco chew use  Comments:  Advised to quit. Hypomagnesemia  -     Magnesium 400 MG TABS; Take 400 mg by mouth daily    Other: declined flu and pneumonia vaccine. Patient education provided. I reviewed the plan of care with the patient. Patient acknowledged understanding and agreed with plan of care overall.     Medications Discontinued During This Encounter   Medication Reason    Dapagliflozin-metFORMIN HCl ER (XIGDUO XR) 5-1000 MG TB24 REORDER    Magnesium 400 MG TABS REORDER    telmisartan (MICARDIS) 80 MG tablet REORDER        General information on medications:  -When it comes to medications, whether with starting or adding a new medication or increasing the dose of a current medication, the benefits and risks have to always be considered and weighed over, especially if one is taking other medications as well. -There are no medications that have no side effects and that there is always a risk involved with taking a medication.    -If a side effect were to occur with starting a new medication or with increasing the dose of a current medication that either the medication can be totally discontinued altogether or simply decrease the dose of it and if this would be the case a follow-up appointment would be deemed necessary.    -The drug allergy list will then be updated with the corresponding side effect(s) if it's deemed to be a true 'drug allergy'. -The most common adverse effects of medication(s) were addressed at today's visit.    -Lastly, the coverage status of a medication may vary from insurance to insurance and the only way to verify if the medication is covered is to send an actual prescription in.    -The drug formulary of each insurance changes without any warning or notification to the healthcare provider let alone the pharmacy.  -The cost of medications vary from insurance to insurance and the cost is always subject to change just like the drug formulary. Follow-up: Return in about 11 weeks (around 3/6/2023) for A1c check - diabetes. .     Patient was informed that if his or her symptoms worsen to follow up with me sooner or go to the nearest ER if the symptoms are very significant and warrant higher level of care.     Regarding my note:  -This note was composed (by me only and not with assistance via a scribe) to the best of my knowledge and recollection of the encounter with the patient using one of my own customized note templates utilizing a combination of typing and dictating with the [address] [place], [place] [address]  [employer] medical speech recognition software. As a result, the note may possibly contain various errors (e.g. spelling, grammar, and non-sensible words/phrases/statements) despite reviewing the note prior to signing it for completion. Time spent includes some or all of the following, both face-to-face time and non face-to-face time, but is not limited to:  [x] Preparing to see the patient by reviewing medical records available (notes, labs, imaging, etc.) prior to seeing the patient. [x] Obtaining and/or reviewing the history from the patient. [x] Performing a medically appropriate examination. [x] Ordering of relevant lab work, medications, referrals, or procedures. [x] Discussing patient's medical issues and formulating an assessment and plan. [x] Reviewing plan of care with patient. Answering any questions or concerns. [x] Documentation within the electronic health record (EHR)  [] Reviewing records of history relevant to patient's issues after seeing the patient. [] Discussion or coordination of care with other health care professionals  [x] Other: length office visit - 15 minutes.  -I spent a significant amount of time discussing various issues as noted above and also with formulating a treatment plan for each specific issue. Patient was given the opportunity to ask me any questions and address any concerns/issues. I also reviewed lab work (if available) as well as prior notes from PCP and/or other specialists if available. Shadi Sainz M.D.   [address] [place] [address]    Electronically signed by Rhonda Jane MD M.D. on 12/19/2022 at 8:53 AM.

## 2022-12-19 ENCOUNTER — OFFICE VISIT (OUTPATIENT)
Dept: FAMILY MEDICINE CLINIC | Age: 62
End: 2022-12-19
Payer: COMMERCIAL

## 2022-12-19 VITALS
SYSTOLIC BLOOD PRESSURE: 118 MMHG | DIASTOLIC BLOOD PRESSURE: 80 MMHG | BODY MASS INDEX: 28.29 KG/M2 | WEIGHT: 208.6 LBS | TEMPERATURE: 98 F

## 2022-12-19 DIAGNOSIS — Z72.0 TOBACCO CHEW USE: ICD-10-CM

## 2022-12-19 DIAGNOSIS — I25.10 CORONARY ARTERY DISEASE DUE TO TYPE 2 DIABETES MELLITUS (HCC): ICD-10-CM

## 2022-12-19 DIAGNOSIS — E11.29 TYPE 2 DIABETES MELLITUS WITH MICROALBUMINURIA, WITHOUT LONG-TERM CURRENT USE OF INSULIN (HCC): ICD-10-CM

## 2022-12-19 DIAGNOSIS — E11.59 HYPERTENSION ASSOCIATED WITH TYPE 2 DIABETES MELLITUS (HCC): Primary | ICD-10-CM

## 2022-12-19 DIAGNOSIS — I15.2 HYPERTENSION ASSOCIATED WITH TYPE 2 DIABETES MELLITUS (HCC): Primary | ICD-10-CM

## 2022-12-19 DIAGNOSIS — R80.9 TYPE 2 DIABETES MELLITUS WITH MICROALBUMINURIA, WITHOUT LONG-TERM CURRENT USE OF INSULIN (HCC): ICD-10-CM

## 2022-12-19 DIAGNOSIS — E11.59 CORONARY ARTERY DISEASE DUE TO TYPE 2 DIABETES MELLITUS (HCC): ICD-10-CM

## 2022-12-19 DIAGNOSIS — R09.81 CHRONIC NASAL CONGESTION: ICD-10-CM

## 2022-12-19 DIAGNOSIS — E83.42 HYPOMAGNESEMIA: ICD-10-CM

## 2022-12-19 LAB
A/G RATIO: 2.3 (ref 1.1–2.2)
ALBUMIN SERPL-MCNC: 4.6 G/DL (ref 3.4–5)
ALP BLD-CCNC: 72 U/L (ref 40–129)
ALT SERPL-CCNC: 16 U/L (ref 10–40)
ANION GAP SERPL CALCULATED.3IONS-SCNC: 10 MMOL/L (ref 3–16)
AST SERPL-CCNC: 18 U/L (ref 15–37)
BILIRUB SERPL-MCNC: 0.4 MG/DL (ref 0–1)
BUN BLDV-MCNC: 16 MG/DL (ref 7–20)
CALCIUM SERPL-MCNC: 9.7 MG/DL (ref 8.3–10.6)
CHLORIDE BLD-SCNC: 101 MMOL/L (ref 99–110)
CO2: 27 MMOL/L (ref 21–32)
CREAT SERPL-MCNC: 1.1 MG/DL (ref 0.8–1.3)
GFR SERPL CREATININE-BSD FRML MDRD: >60 ML/MIN/{1.73_M2}
GLUCOSE BLD-MCNC: 120 MG/DL (ref 70–99)
POTASSIUM SERPL-SCNC: 5.1 MMOL/L (ref 3.5–5.1)
SODIUM BLD-SCNC: 138 MMOL/L (ref 136–145)
TOTAL PROTEIN: 6.6 G/DL (ref 6.4–8.2)

## 2022-12-19 PROCEDURE — 3052F HG A1C>EQUAL 8.0%<EQUAL 9.0%: CPT | Performed by: FAMILY MEDICINE

## 2022-12-19 PROCEDURE — 99213 OFFICE O/P EST LOW 20 MIN: CPT | Performed by: FAMILY MEDICINE

## 2022-12-19 PROCEDURE — 3078F DIAST BP <80 MM HG: CPT | Performed by: FAMILY MEDICINE

## 2022-12-19 PROCEDURE — 3074F SYST BP LT 130 MM HG: CPT | Performed by: FAMILY MEDICINE

## 2022-12-19 RX ORDER — DAPAGLIFLOZIN AND METFORMIN HYDROCHLORIDE 5; 1000 MG/1; MG/1
TABLET, FILM COATED, EXTENDED RELEASE ORAL
Qty: 180 TABLET | Refills: 1 | Status: SHIPPED | OUTPATIENT
Start: 2022-12-19

## 2022-12-19 RX ORDER — CALCIUM CARBONATE 300MG(750)
400 TABLET,CHEWABLE ORAL DAILY
Qty: 90 TABLET | Refills: 1 | Status: SHIPPED | OUTPATIENT
Start: 2022-12-19

## 2022-12-19 RX ORDER — AZELASTINE 1 MG/ML
2 SPRAY, METERED NASAL 2 TIMES DAILY
Qty: 120 ML | Refills: 1 | Status: SHIPPED | OUTPATIENT
Start: 2022-12-19

## 2022-12-19 RX ORDER — MAGNESIUM OXIDE 400 MG/1
TABLET ORAL
COMMUNITY
Start: 2022-11-12

## 2022-12-19 RX ORDER — TELMISARTAN 80 MG/1
80 TABLET ORAL NIGHTLY
Qty: 90 TABLET | Refills: 1 | Status: SHIPPED | OUTPATIENT
Start: 2022-12-19

## 2022-12-19 ASSESSMENT — ENCOUNTER SYMPTOMS
RHINORRHEA: 1
NAUSEA: 0
ABDOMINAL DISTENTION: 0
DIARRHEA: 0
BLOOD IN STOOL: 0
VOMITING: 0
TROUBLE SWALLOWING: 0
WHEEZING: 0
ABDOMINAL PAIN: 0
SINUS PRESSURE: 1
COUGH: 1
BACK PAIN: 0
SHORTNESS OF BREATH: 0
CHEST TIGHTNESS: 0
CONSTIPATION: 0

## 2022-12-20 LAB
ESTIMATED AVERAGE GLUCOSE: 142.7 MG/DL
HBA1C MFR BLD: 6.6 %

## 2023-01-05 RX ORDER — GLIMEPIRIDE 4 MG/1
4 TABLET ORAL
Qty: 30 TABLET | Refills: 5 | Status: SHIPPED | OUTPATIENT
Start: 2023-01-05

## 2023-01-05 NOTE — TELEPHONE ENCOUNTER
Pt calling med refill pt is  out   glimepiride (AMARYL) 4 MG tablet [[id number]]   Dose: -- Route: -- Frequency: twice DAILY   Dispense Quantity: -- Refills: 1          Sig: daily         Start Date: 01/08/19 End Date: --   Lov:12/19/2022  Lrx:01/08/2019  Nov unknown   Pharmacy [address] [place] [address] [address] [place], [address] [facility]

## 2023-02-06 DIAGNOSIS — E11.59 CORONARY ARTERY DISEASE DUE TO TYPE 2 DIABETES MELLITUS (HCC): ICD-10-CM

## 2023-02-06 DIAGNOSIS — I25.10 CORONARY ARTERY DISEASE DUE TO TYPE 2 DIABETES MELLITUS (HCC): ICD-10-CM

## 2023-02-06 PROBLEM — I15.2 HYPERTENSION ASSOCIATED WITH TYPE 2 DIABETES MELLITUS (HCC): Chronic | Status: ACTIVE | Noted: 2022-07-26

## 2023-02-06 PROBLEM — E11.69 HYPERLIPIDEMIA DUE TO TYPE 2 DIABETES MELLITUS (HCC): Status: ACTIVE | Noted: 2019-03-08

## 2023-02-06 PROBLEM — E78.5 HYPERLIPIDEMIA DUE TO TYPE 2 DIABETES MELLITUS (HCC): Chronic | Status: ACTIVE | Noted: 2019-03-08

## 2023-02-06 PROBLEM — E11.69 HYPERLIPIDEMIA DUE TO TYPE 2 DIABETES MELLITUS (HCC): Chronic | Status: ACTIVE | Noted: 2019-03-08

## 2023-02-06 RX ORDER — DAPAGLIFLOZIN AND METFORMIN HYDROCHLORIDE 5; 1000 MG/1; MG/1
TABLET, FILM COATED, EXTENDED RELEASE ORAL
Qty: 180 TABLET | Refills: 1 | Status: SHIPPED | OUTPATIENT
Start: 2023-02-06

## 2023-02-06 NOTE — TELEPHONE ENCOUNTER
Pt returned call for clarification. Pt stated that [facility] only gave him a 1mo supply as they do not accept his insurance. Pt would like rx sent to [facility] Rx pending.

## 2023-02-06 NOTE — TELEPHONE ENCOUNTER
Pt should not be out of this medication. .. LRX: 12/19/22 180 tabs BID (90 day supply) with 1 refill that was sent to [facility]. LM for pt to call the office to advise of this.

## 2023-02-06 NOTE — TELEPHONE ENCOUNTER
Pt requesting a medication refill.   Dapagliflozin-metFORMIN HCl ER (XIGDUO XR) 5-1000 MG TB24 180 tablet 1 12/19/2022     Sig: Take 1 tablet PO BID    [facility] 12/19/22  No future appt scheduled

## 2023-06-01 ENCOUNTER — TELEPHONE (OUTPATIENT)
Dept: FAMILY MEDICINE CLINIC | Age: 63
End: 2023-06-01

## 2023-06-01 NOTE — TELEPHONE ENCOUNTER
Patient called stating he was cleaning medicine cabinet and accidentally threw out Olmesartan. Spoke with [facility] pharmacy and patient will be able to get Rx refilled as did not go through insurance. Patient notified Rx will be ready later today.

## 2023-06-15 DIAGNOSIS — J01.90 ACUTE BACTERIAL SINUSITIS: ICD-10-CM

## 2023-06-15 DIAGNOSIS — B96.89 ACUTE BACTERIAL SINUSITIS: ICD-10-CM

## 2023-06-19 RX ORDER — AMOXICILLIN AND CLAVULANATE POTASSIUM 875; 125 MG/1; MG/1
TABLET, FILM COATED ORAL
Qty: 10 TABLET | Refills: 0 | Status: SHIPPED | OUTPATIENT
Start: 2023-06-19

## 2023-06-30 RX ORDER — GLIMEPIRIDE 4 MG/1
TABLET ORAL
Qty: 90 TABLET | Refills: 1 | Status: SHIPPED | OUTPATIENT
Start: 2023-06-30

## 2023-10-08 DIAGNOSIS — E11.69 TYPE 2 DIABETES MELLITUS WITH HYPERLIPIDEMIA (HCC): ICD-10-CM

## 2023-10-08 DIAGNOSIS — E11.59 CORONARY ARTERY DISEASE DUE TO TYPE 2 DIABETES MELLITUS (HCC): ICD-10-CM

## 2023-10-08 DIAGNOSIS — I25.10 CORONARY ARTERY DISEASE DUE TO TYPE 2 DIABETES MELLITUS (HCC): ICD-10-CM

## 2023-10-08 DIAGNOSIS — E78.5 TYPE 2 DIABETES MELLITUS WITH HYPERLIPIDEMIA (HCC): ICD-10-CM

## 2023-10-08 DIAGNOSIS — E11.59 HYPERTENSION ASSOCIATED WITH TYPE 2 DIABETES MELLITUS (HCC): ICD-10-CM

## 2023-10-08 DIAGNOSIS — I15.2 HYPERTENSION ASSOCIATED WITH TYPE 2 DIABETES MELLITUS (HCC): ICD-10-CM

## 2023-10-08 NOTE — PROGRESS NOTES
as well. -There are no medications that have no side effects and that there is always a risk involved with taking a medication.    -If a side effect were to occur with starting a new medication or with increasing the dose of a current medication that either the medication can be totally discontinued altogether or simply decrease the dose of it and if this would be the case a follow-up appointment would be deemed necessary.    -The drug allergy list will then be updated with the corresponding side effect(s) if it's deemed to be a true 'drug allergy'. -The most common adverse effects of medication(s) were addressed at today's visit.    -Lastly, the coverage status of a medication may vary from insurance to insurance and the only way to verify if the medication is covered is to send an actual prescription in.    -The drug formulary of each insurance changes without any warning or notification to the healthcare provider let alone the pharmacy.  -The cost of medications vary from insurance to insurance and the cost is always subject to change just like the drug formulary. Follow-up: Return in about 3 months (around 1/13/2024) for Diabetes follow-up. .     Patient was informed that if his or her symptoms worsen to follow up with me sooner or go to the nearest ER if the symptoms are very significant and warrant higher level of care. Regarding my note:  -This note was composed (by me only and not with assistance via a scribe) to the best of my knowledge and recollection of the encounter with the patient using one of my own customized note templates utilizing a combination of typing and dictating with the [employer] speech recognition software. As a result, the note may possibly contain various errors (e.g. spelling, grammar, and non-sensible words/phrases/statements) despite reviewing the note prior to signing it for completion.       Time spent includes some or all of the following, both

## 2023-10-09 RX ORDER — OLMESARTAN MEDOXOMIL 40 MG/1
40 TABLET ORAL
Qty: 90 TABLET | Refills: 1 | OUTPATIENT
Start: 2023-10-09

## 2023-10-13 ENCOUNTER — OFFICE VISIT (OUTPATIENT)
Dept: FAMILY MEDICINE CLINIC | Age: 63
End: 2023-10-13
Payer: COMMERCIAL

## 2023-10-13 VITALS
BODY MASS INDEX: 29.51 KG/M2 | SYSTOLIC BLOOD PRESSURE: 122 MMHG | HEART RATE: 71 BPM | TEMPERATURE: 97.2 F | OXYGEN SATURATION: 98 % | WEIGHT: 217.6 LBS | DIASTOLIC BLOOD PRESSURE: 82 MMHG

## 2023-10-13 DIAGNOSIS — E66.3 OVERWEIGHT WITH BODY MASS INDEX (BMI) OF 29 TO 29.9 IN ADULT: ICD-10-CM

## 2023-10-13 DIAGNOSIS — Z91.199 MEDICAL NON-COMPLIANCE: ICD-10-CM

## 2023-10-13 DIAGNOSIS — E78.5 TYPE 2 DIABETES MELLITUS WITH HYPERLIPIDEMIA (HCC): ICD-10-CM

## 2023-10-13 DIAGNOSIS — E11.59 CORONARY ARTERY DISEASE DUE TO TYPE 2 DIABETES MELLITUS (HCC): Primary | ICD-10-CM

## 2023-10-13 DIAGNOSIS — M25.551 CHRONIC RIGHT HIP PAIN: ICD-10-CM

## 2023-10-13 DIAGNOSIS — I15.2 HYPERTENSION ASSOCIATED WITH TYPE 2 DIABETES MELLITUS (HCC): ICD-10-CM

## 2023-10-13 DIAGNOSIS — E83.42 HYPOMAGNESEMIA: ICD-10-CM

## 2023-10-13 DIAGNOSIS — G89.29 CHRONIC RIGHT HIP PAIN: ICD-10-CM

## 2023-10-13 DIAGNOSIS — I25.10 CORONARY ARTERY DISEASE DUE TO TYPE 2 DIABETES MELLITUS (HCC): Primary | ICD-10-CM

## 2023-10-13 DIAGNOSIS — E11.59 HYPERTENSION ASSOCIATED WITH TYPE 2 DIABETES MELLITUS (HCC): ICD-10-CM

## 2023-10-13 DIAGNOSIS — E11.69 TYPE 2 DIABETES MELLITUS WITH HYPERLIPIDEMIA (HCC): ICD-10-CM

## 2023-10-13 LAB
ALBUMIN SERPL-MCNC: 4.9 G/DL (ref 3.4–5)
ALBUMIN/GLOB SERPL: 2.5 {RATIO} (ref 1.1–2.2)
ALP SERPL-CCNC: 67 U/L (ref 40–129)
ALT SERPL-CCNC: 16 U/L (ref 10–40)
ANION GAP SERPL CALCULATED.3IONS-SCNC: 11 MMOL/L (ref 3–16)
AST SERPL-CCNC: 18 U/L (ref 15–37)
BILIRUB SERPL-MCNC: 0.5 MG/DL (ref 0–1)
BUN SERPL-MCNC: 23 MG/DL (ref 7–20)
CALCIUM SERPL-MCNC: 9.9 MG/DL (ref 8.3–10.6)
CHLORIDE SERPL-SCNC: 99 MMOL/L (ref 99–110)
CHOLEST SERPL-MCNC: 106 MG/DL (ref 0–199)
CO2 SERPL-SCNC: 28 MMOL/L (ref 21–32)
CREAT SERPL-MCNC: 1.2 MG/DL (ref 0.8–1.3)
GFR SERPLBLD CREATININE-BSD FMLA CKD-EPI: >60 ML/MIN/{1.73_M2}
GLUCOSE SERPL-MCNC: 150 MG/DL (ref 70–99)
HDLC SERPL-MCNC: 38 MG/DL (ref 40–60)
LDLC SERPL CALC-MCNC: 51 MG/DL
MAGNESIUM SERPL-MCNC: 1.9 MG/DL (ref 1.8–2.4)
POTASSIUM SERPL-SCNC: 5.6 MMOL/L (ref 3.5–5.1)
PROT SERPL-MCNC: 6.9 G/DL (ref 6.4–8.2)
SODIUM SERPL-SCNC: 138 MMOL/L (ref 136–145)
TRIGL SERPL-MCNC: 86 MG/DL (ref 0–150)
VLDLC SERPL CALC-MCNC: 17 MG/DL

## 2023-10-13 PROCEDURE — 99214 OFFICE O/P EST MOD 30 MIN: CPT | Performed by: FAMILY MEDICINE

## 2023-10-13 PROCEDURE — 3051F HG A1C>EQUAL 7.0%<8.0%: CPT | Performed by: FAMILY MEDICINE

## 2023-10-13 PROCEDURE — 3074F SYST BP LT 130 MM HG: CPT | Performed by: FAMILY MEDICINE

## 2023-10-13 PROCEDURE — 3079F DIAST BP 80-89 MM HG: CPT | Performed by: FAMILY MEDICINE

## 2023-10-13 RX ORDER — AZELASTINE 1 MG/ML
2 SPRAY, METERED NASAL 2 TIMES DAILY
Qty: 120 ML | Refills: 1 | Status: CANCELLED | OUTPATIENT
Start: 2023-10-13

## 2023-10-13 RX ORDER — DAPAGLIFLOZIN AND METFORMIN HYDROCHLORIDE 5; 1000 MG/1; MG/1
TABLET, FILM COATED, EXTENDED RELEASE ORAL
Qty: 180 TABLET | Refills: 2 | Status: SHIPPED | OUTPATIENT
Start: 2023-10-13

## 2023-10-13 RX ORDER — CLOPIDOGREL BISULFATE 75 MG/1
75 TABLET ORAL DAILY
Qty: 90 TABLET | Refills: 3 | Status: CANCELLED | OUTPATIENT
Start: 2023-10-13

## 2023-10-13 RX ORDER — METOPROLOL SUCCINATE 25 MG/1
25 TABLET, EXTENDED RELEASE ORAL NIGHTLY
Qty: 90 TABLET | Refills: 1 | Status: CANCELLED | OUTPATIENT
Start: 2023-10-13

## 2023-10-13 RX ORDER — OLMESARTAN MEDOXOMIL 40 MG/1
40 TABLET ORAL NIGHTLY
Qty: 90 TABLET | Refills: 2 | Status: SHIPPED | OUTPATIENT
Start: 2023-10-13

## 2023-10-13 RX ORDER — CALCIUM CARBONATE 300MG(750)
400 TABLET,CHEWABLE ORAL DAILY
Qty: 90 TABLET | Refills: 1 | Status: CANCELLED | OUTPATIENT
Start: 2023-10-13

## 2023-10-13 ASSESSMENT — ENCOUNTER SYMPTOMS
CHEST TIGHTNESS: 0
NAUSEA: 0
ABDOMINAL DISTENTION: 0
WHEEZING: 0
DIARRHEA: 0
RHINORRHEA: 0
SINUS PRESSURE: 0
BACK PAIN: 0
ABDOMINAL PAIN: 0
TROUBLE SWALLOWING: 0
SHORTNESS OF BREATH: 0
CONSTIPATION: 0
BLOOD IN STOOL: 0
VOMITING: 0
COUGH: 0

## 2023-10-14 LAB
EST. AVERAGE GLUCOSE BLD GHB EST-MCNC: 142.7 MG/DL
HBA1C MFR BLD: 6.6 %

## 2023-10-15 DIAGNOSIS — E87.5 HYPERKALEMIA: Primary | ICD-10-CM

## 2023-11-01 ENCOUNTER — TELEPHONE (OUTPATIENT)
Dept: FAMILY MEDICINE CLINIC | Age: 63
End: 2023-11-01

## 2023-11-01 NOTE — TELEPHONE ENCOUNTER
Spoke with patient. He stated that [facility] said we would have to request his recent lab results, and he wanted us to do that so he could discuss with Dr. Simon Arguello. I called [facility] ([phone]). They will fax over lab results. They stated he is on testosterone and his PSA has been rising, and they are wondering if it is okay for him to continue the testosterone treatment. Will scan in lab results once received from [facility].

## 2023-11-01 NOTE — TELEPHONE ENCOUNTER
Spoke with pt and advised to have him contact [facility] to have results faxed to us for PCP to review. Pt verbalized understanding and fax number was given.

## 2023-11-01 NOTE — TELEPHONE ENCOUNTER
Pt called stating he did some labs at [address] and stated his Testerone level went from 3.7 to 4.1 and is wanting to know why it has went up. Pt stated he has been doing Testerone therapy.

## 2023-11-03 NOTE — TELEPHONE ENCOUNTER
Please contact this place about these lab results. I do not see them in the media section or in my bin. Shadi CASTANEDA  [facility] [address]

## 2023-11-06 NOTE — TELEPHONE ENCOUNTER
Results are still not scanned into media. Contacted number below who stated that they will need a formal release faxed to them or that pt can request them himself. Contacted pt and advised of this. Pt stated that he will contact them to have the labs sent to us.

## 2023-11-21 ENCOUNTER — TELEPHONE (OUTPATIENT)
Dept: FAMILY MEDICINE CLINIC | Age: 63
End: 2023-11-21

## 2023-11-21 DIAGNOSIS — R53.82 CHRONIC FATIGUE: ICD-10-CM

## 2023-11-21 DIAGNOSIS — R97.20 ELEVATED PSA, LESS THAN 10 NG/ML: Primary | ICD-10-CM

## 2023-11-21 NOTE — TELEPHONE ENCOUNTER
Pt states he sent My Chart message several days ago about his testosterone treatment and has not heard back yet. Please give patient a call when able regarding continuing testosterone replacement or not.

## 2024-01-22 NOTE — PROGRESS NOTES
of the following, both face-to-face time and non face-to-face time, but is not limited to:  [x] Preparing to see the patient by reviewing medical records available (notes, labs, imaging, etc.) prior to seeing the patient.  [x] Obtaining and/or reviewing the history from the patient during the visit.  [x] Performing a medically appropriate examination.  [x] Ordering of relevant lab work, medications, referrals, or procedures when indicated.  [x] Discussing patient's medical issues and formulating an assessment and plan.   [x] Reviewing plan of care with patient.  Answering any questions or concerns.   [x] Documentation of the visit within the electronic health record (EHR).  [x] Reviewing records of history relevant to patient's issues after seeing the patient.  [] Discussion or coordination of care with other health care professionals.  [x] Other: length office visit - 20 minutes. Estimate additional time spent on other things outside of the office visit - 8 minutes.  -I spent a significant amount of time discussing various issues as noted above and also with formulating a treatment plan for each specific issue. Patient was given the opportunity to ask me any questions and address any concerns/issues. I also reviewed lab work (if available) as well as prior notes from PCP and/or other specialists if available.   [] An  was used during today's visit.  Care and attention was made to make a conscious effort to speak in short, comprehensible phrases.  I had to (at times) repeat or rephrase what I had said to the patient and effort was made to allow ample time for both patient and  to speak without interruption.      Shadi Zamora M.D.  Family Medicine  [facility]    Electronically signed by Shadi Zamora MD M.D. on 1/24/2024 at 6:39 PM.

## 2024-01-24 ENCOUNTER — OFFICE VISIT (OUTPATIENT)
Dept: FAMILY MEDICINE CLINIC | Age: 64
End: 2024-01-24
Payer: COMMERCIAL

## 2024-01-24 VITALS
TEMPERATURE: 97.4 F | BODY MASS INDEX: 28.4 KG/M2 | DIASTOLIC BLOOD PRESSURE: 82 MMHG | WEIGHT: 209.4 LBS | SYSTOLIC BLOOD PRESSURE: 142 MMHG

## 2024-01-24 DIAGNOSIS — R39.12 WEAK URINARY STREAM: ICD-10-CM

## 2024-01-24 DIAGNOSIS — M25.551 CHRONIC RIGHT HIP PAIN: ICD-10-CM

## 2024-01-24 DIAGNOSIS — R53.82 CHRONIC FATIGUE: ICD-10-CM

## 2024-01-24 DIAGNOSIS — Z86.39 HISTORY OF HYPERKALEMIA: ICD-10-CM

## 2024-01-24 DIAGNOSIS — E78.5 TYPE 2 DIABETES MELLITUS WITH HYPERLIPIDEMIA (HCC): ICD-10-CM

## 2024-01-24 DIAGNOSIS — G89.29 CHRONIC RIGHT HIP PAIN: ICD-10-CM

## 2024-01-24 DIAGNOSIS — Z95.5 STATUS POST INSERTION OF DRUG-ELUTING STENT INTO LEFT ANTERIOR DESCENDING (LAD) ARTERY: ICD-10-CM

## 2024-01-24 DIAGNOSIS — R97.20 ELEVATED PSA, LESS THAN 10 NG/ML: ICD-10-CM

## 2024-01-24 DIAGNOSIS — E11.59 CORONARY ARTERY DISEASE DUE TO TYPE 2 DIABETES MELLITUS (HCC): Primary | ICD-10-CM

## 2024-01-24 DIAGNOSIS — I15.2 HYPERTENSION ASSOCIATED WITH TYPE 2 DIABETES MELLITUS (HCC): ICD-10-CM

## 2024-01-24 DIAGNOSIS — E11.59 HYPERTENSION ASSOCIATED WITH TYPE 2 DIABETES MELLITUS (HCC): ICD-10-CM

## 2024-01-24 DIAGNOSIS — E29.1 HYPOGONADISM IN MALE: ICD-10-CM

## 2024-01-24 DIAGNOSIS — E11.69 TYPE 2 DIABETES MELLITUS WITH HYPERLIPIDEMIA (HCC): ICD-10-CM

## 2024-01-24 DIAGNOSIS — I25.10 CORONARY ARTERY DISEASE DUE TO TYPE 2 DIABETES MELLITUS (HCC): Primary | ICD-10-CM

## 2024-01-24 LAB
25(OH)D3 SERPL-MCNC: 28.3 NG/ML
ALBUMIN SERPL-MCNC: 5.1 G/DL (ref 3.4–5)
ALBUMIN/GLOB SERPL: 2.6 {RATIO} (ref 1.1–2.2)
ALP SERPL-CCNC: 62 U/L (ref 40–129)
ALT SERPL-CCNC: 21 U/L (ref 10–40)
ANION GAP SERPL CALCULATED.3IONS-SCNC: 14 MMOL/L (ref 3–16)
AST SERPL-CCNC: 20 U/L (ref 15–37)
BASOPHILS # BLD: 0 K/UL (ref 0–0.2)
BASOPHILS NFR BLD: 0.5 %
BILIRUB SERPL-MCNC: 0.6 MG/DL (ref 0–1)
BUN SERPL-MCNC: 21 MG/DL (ref 7–20)
CALCIUM SERPL-MCNC: 9.8 MG/DL (ref 8.3–10.6)
CHLORIDE SERPL-SCNC: 99 MMOL/L (ref 99–110)
CHOLEST SERPL-MCNC: 132 MG/DL (ref 0–199)
CK SERPL-CCNC: 84 U/L (ref 39–308)
CO2 SERPL-SCNC: 28 MMOL/L (ref 21–32)
CREAT SERPL-MCNC: 0.9 MG/DL (ref 0.8–1.3)
DEPRECATED RDW RBC AUTO: 15.2 % (ref 12.4–15.4)
EOSINOPHIL # BLD: 0.2 K/UL (ref 0–0.6)
EOSINOPHIL NFR BLD: 2.9 %
FERRITIN SERPL IA-MCNC: 182.9 NG/ML (ref 30–400)
FOLATE SERPL-MCNC: 8.48 NG/ML (ref 4.78–24.2)
GFR SERPLBLD CREATININE-BSD FMLA CKD-EPI: >60 ML/MIN/{1.73_M2}
GLUCOSE SERPL-MCNC: 156 MG/DL (ref 70–99)
HCT VFR BLD AUTO: 45.8 % (ref 40.5–52.5)
HDLC SERPL-MCNC: 46 MG/DL (ref 40–60)
HGB BLD-MCNC: 15.1 G/DL (ref 13.5–17.5)
IRON SATN MFR SERPL: 35 % (ref 20–50)
IRON SERPL-MCNC: 130 UG/DL (ref 59–158)
LDLC SERPL CALC-MCNC: 67 MG/DL
LYMPHOCYTES # BLD: 1.1 K/UL (ref 1–5.1)
LYMPHOCYTES NFR BLD: 19.3 %
MAGNESIUM SERPL-MCNC: 1.7 MG/DL (ref 1.8–2.4)
MCH RBC QN AUTO: 27.7 PG (ref 26–34)
MCHC RBC AUTO-ENTMCNC: 32.9 G/DL (ref 31–36)
MCV RBC AUTO: 84.3 FL (ref 80–100)
MONOCYTES # BLD: 0.5 K/UL (ref 0–1.3)
MONOCYTES NFR BLD: 8.7 %
NEUTROPHILS # BLD: 4 K/UL (ref 1.7–7.7)
NEUTROPHILS NFR BLD: 68.6 %
PLATELET # BLD AUTO: 192 K/UL (ref 135–450)
PMV BLD AUTO: 9.7 FL (ref 5–10.5)
POTASSIUM SERPL-SCNC: 5 MMOL/L (ref 3.5–5.1)
PROT SERPL-MCNC: 7.1 G/DL (ref 6.4–8.2)
PSA SERPL DL<=0.01 NG/ML-MCNC: 4.09 NG/ML (ref 0–4)
RBC # BLD AUTO: 5.44 M/UL (ref 4.2–5.9)
SODIUM SERPL-SCNC: 141 MMOL/L (ref 136–145)
TIBC SERPL-MCNC: 368 UG/DL (ref 260–445)
TRIGL SERPL-MCNC: 94 MG/DL (ref 0–150)
VIT B12 SERPL-MCNC: >2000 PG/ML (ref 211–911)
VLDLC SERPL CALC-MCNC: 19 MG/DL
WBC # BLD AUTO: 5.8 K/UL (ref 4–11)

## 2024-01-24 PROCEDURE — 3079F DIAST BP 80-89 MM HG: CPT | Performed by: FAMILY MEDICINE

## 2024-01-24 PROCEDURE — 3077F SYST BP >= 140 MM HG: CPT | Performed by: FAMILY MEDICINE

## 2024-01-24 PROCEDURE — 99213 OFFICE O/P EST LOW 20 MIN: CPT | Performed by: FAMILY MEDICINE

## 2024-01-24 ASSESSMENT — ENCOUNTER SYMPTOMS
TROUBLE SWALLOWING: 0
BACK PAIN: 0
ABDOMINAL DISTENTION: 0
COUGH: 0
BLOOD IN STOOL: 0
CONSTIPATION: 0
SHORTNESS OF BREATH: 0
SINUS PRESSURE: 0
VOMITING: 0
DIARRHEA: 0
WHEEZING: 0
NAUSEA: 0
RHINORRHEA: 0
CHEST TIGHTNESS: 0
ABDOMINAL PAIN: 0

## 2024-01-24 ASSESSMENT — PATIENT HEALTH QUESTIONNAIRE - PHQ9
SUM OF ALL RESPONSES TO PHQ QUESTIONS 1-9: 0
SUM OF ALL RESPONSES TO PHQ QUESTIONS 1-9: 0
1. LITTLE INTEREST OR PLEASURE IN DOING THINGS: 0
SUM OF ALL RESPONSES TO PHQ QUESTIONS 1-9: 0
2. FEELING DOWN, DEPRESSED OR HOPELESS: 0
SUM OF ALL RESPONSES TO PHQ9 QUESTIONS 1 & 2: 0
SUM OF ALL RESPONSES TO PHQ QUESTIONS 1-9: 0

## 2024-01-25 LAB
EST. AVERAGE GLUCOSE BLD GHB EST-MCNC: 231.7 MG/DL
HBA1C MFR BLD: 9.7 %

## 2024-01-26 LAB — TESTOST SERPL-MCNC: 408 NG/DL (ref 220–1000)

## 2024-02-14 ENCOUNTER — TELEPHONE (OUTPATIENT)
Dept: CARDIOLOGY CLINIC | Age: 64
End: 2024-02-14

## 2024-02-14 NOTE — TELEPHONE ENCOUNTER
Pt called to schedule appt with [facility], Pt would like to stay at [facility].  Scheduled w/[facility]    Pls advise thank you

## 2024-02-14 NOTE — PROGRESS NOTES
applicable)    Pre-op assessment:     [x] Patient's condition is overall stable from cardiovascular perspective.  However, his last A1c was >9%.  We discussed that an A1c > 9% indicates poor diabetes control.  We discussed the various complications of diabetes including delayed wound recovery, increase risk for infection, neuropathy, etc.  I recommended pt to help reduce the chance of post-op complications, to achieve better glycemic control (A1c < 8.0 % if not < 7.0%) would be more ideal.  I will defer to his orthopedist if it's okay to move fwd despite msot recent A1c > 9%.  Will obtain another A1c for comparison.           Other: I discussed with patient about avoiding NSAIDS at least 7 days prior to procedure and afterwards. Counseled patient to only use Tylenol for pain and if pain persists to schedule an appointment with me.    Shadi Zamora MD  Family Medicine  [facility]    Electronically signed by Shadi Zamora MD on 2/16/2024 at 5:48 PM.

## 2024-02-15 ENCOUNTER — OFFICE VISIT (OUTPATIENT)
Dept: FAMILY MEDICINE CLINIC | Age: 64
End: 2024-02-15
Payer: COMMERCIAL

## 2024-02-15 VITALS
DIASTOLIC BLOOD PRESSURE: 70 MMHG | HEIGHT: 72 IN | TEMPERATURE: 97.5 F | WEIGHT: 210.8 LBS | HEART RATE: 49 BPM | SYSTOLIC BLOOD PRESSURE: 118 MMHG | RESPIRATION RATE: 16 BRPM | BODY MASS INDEX: 28.55 KG/M2 | OXYGEN SATURATION: 99 %

## 2024-02-15 DIAGNOSIS — I15.2 HYPERTENSION ASSOCIATED WITH TYPE 2 DIABETES MELLITUS (HCC): ICD-10-CM

## 2024-02-15 DIAGNOSIS — I25.10 CORONARY ARTERY DISEASE DUE TO TYPE 2 DIABETES MELLITUS (HCC): ICD-10-CM

## 2024-02-15 DIAGNOSIS — Z01.818 PREOPERATIVE CLEARANCE: Primary | ICD-10-CM

## 2024-02-15 DIAGNOSIS — E66.3 OVERWEIGHT WITH BODY MASS INDEX (BMI) OF 28 TO 28.9 IN ADULT: ICD-10-CM

## 2024-02-15 DIAGNOSIS — Z95.5 STATUS POST INSERTION OF DRUG-ELUTING STENT INTO LEFT ANTERIOR DESCENDING (LAD) ARTERY: ICD-10-CM

## 2024-02-15 DIAGNOSIS — R73.09 HEMOGLOBIN A1C GREATER THAN 9%, INDICATING POOR DIABETIC CONTROL: ICD-10-CM

## 2024-02-15 DIAGNOSIS — E11.59 HYPERTENSION ASSOCIATED WITH TYPE 2 DIABETES MELLITUS (HCC): ICD-10-CM

## 2024-02-15 DIAGNOSIS — E11.59 CORONARY ARTERY DISEASE DUE TO TYPE 2 DIABETES MELLITUS (HCC): ICD-10-CM

## 2024-02-15 LAB
APTT BLD: 26.6 SEC (ref 22.7–35.9)
INR PPP: 0.88 (ref 0.84–1.16)
PROTHROMBIN TIME: 11.9 SEC (ref 11.5–14.8)

## 2024-02-15 PROCEDURE — 99213 OFFICE O/P EST LOW 20 MIN: CPT | Performed by: FAMILY MEDICINE

## 2024-02-15 PROCEDURE — 3078F DIAST BP <80 MM HG: CPT | Performed by: FAMILY MEDICINE

## 2024-02-15 PROCEDURE — 3074F SYST BP LT 130 MM HG: CPT | Performed by: FAMILY MEDICINE

## 2024-02-15 PROCEDURE — 3046F HEMOGLOBIN A1C LEVEL >9.0%: CPT | Performed by: FAMILY MEDICINE

## 2024-02-15 PROCEDURE — 93000 ELECTROCARDIOGRAM COMPLETE: CPT | Performed by: FAMILY MEDICINE

## 2024-02-16 LAB
ALBUMIN SERPL-MCNC: 5.1 G/DL (ref 3.4–5)
ALP SERPL-CCNC: 61 U/L (ref 40–129)
ALT SERPL-CCNC: 18 U/L (ref 10–40)
ANION GAP SERPL CALCULATED.3IONS-SCNC: 13 MMOL/L (ref 3–16)
AST SERPL-CCNC: 19 U/L (ref 15–37)
BASOPHILS # BLD: 0 K/UL (ref 0–0.2)
BASOPHILS NFR BLD: 0.7 %
BILIRUB DIRECT SERPL-MCNC: <0.2 MG/DL (ref 0–0.3)
BILIRUB INDIRECT SERPL-MCNC: ABNORMAL MG/DL (ref 0–1)
BILIRUB SERPL-MCNC: 0.5 MG/DL (ref 0–1)
BUN SERPL-MCNC: 18 MG/DL (ref 7–20)
CALCIUM SERPL-MCNC: 10.4 MG/DL (ref 8.3–10.6)
CHLORIDE SERPL-SCNC: 101 MMOL/L (ref 99–110)
CO2 SERPL-SCNC: 28 MMOL/L (ref 21–32)
CREAT SERPL-MCNC: 1 MG/DL (ref 0.8–1.3)
DEPRECATED RDW RBC AUTO: 17.2 % (ref 12.4–15.4)
EOSINOPHIL # BLD: 0.1 K/UL (ref 0–0.6)
EOSINOPHIL NFR BLD: 2 %
EST. AVERAGE GLUCOSE BLD GHB EST-MCNC: 217.3 MG/DL
GFR SERPLBLD CREATININE-BSD FMLA CKD-EPI: >60 ML/MIN/{1.73_M2}
GLUCOSE SERPL-MCNC: 168 MG/DL (ref 70–99)
HAV IGM SERPL QL IA: NORMAL
HBA1C MFR BLD: 9.2 %
HBV CORE IGM SERPL QL IA: NORMAL
HBV SURFACE AG SERPL QL IA: NORMAL
HCT VFR BLD AUTO: 46.5 % (ref 40.5–52.5)
HCV AB SERPL QL IA: NORMAL
HGB BLD-MCNC: 15.1 G/DL (ref 13.5–17.5)
HIV 1+2 AB+HIV1 P24 AG SERPL QL IA: NORMAL
HIV 2 AB SERPL QL IA: NORMAL
HIV1 AB SERPL QL IA: NORMAL
HIV1 P24 AG SERPL QL IA: NORMAL
LYMPHOCYTES # BLD: 1.2 K/UL (ref 1–5.1)
LYMPHOCYTES NFR BLD: 20.2 %
MCH RBC QN AUTO: 28.2 PG (ref 26–34)
MCHC RBC AUTO-ENTMCNC: 32.4 G/DL (ref 31–36)
MCV RBC AUTO: 86.9 FL (ref 80–100)
MONOCYTES # BLD: 0.5 K/UL (ref 0–1.3)
MONOCYTES NFR BLD: 7.7 %
NEUTROPHILS # BLD: 4.2 K/UL (ref 1.7–7.7)
NEUTROPHILS NFR BLD: 69.4 %
PHOSPHATE SERPL-MCNC: 3.4 MG/DL (ref 2.5–4.9)
PLATELET # BLD AUTO: 218 K/UL (ref 135–450)
PMV BLD AUTO: 9.5 FL (ref 5–10.5)
POTASSIUM SERPL-SCNC: 5.1 MMOL/L (ref 3.5–5.1)
PROT SERPL-MCNC: 7.4 G/DL (ref 6.4–8.2)
RBC # BLD AUTO: 5.36 M/UL (ref 4.2–5.9)
SODIUM SERPL-SCNC: 142 MMOL/L (ref 136–145)
WBC # BLD AUTO: 6 K/UL (ref 4–11)

## 2024-02-16 NOTE — PROGRESS NOTES
regurgitation.   -Mild tricuspid regurgitation with an estimated PASP of 35-40 mmHg.    NM STRESS 08/08/2022  Small sized apical significant partial reversibility defect of moderate   intensity consistent with ischemia in the territory of the distal LAD .   Left ventricular ejection fraction of 40 %.   Overall findings represent a intermediate risk scan.    Recommendation   Recommend cardiac catheterization depending on clinical appropriateness.    Assessment/Plan:   1.CAD  EF 40% per last Echo 08/2022, Denies any Chest Pain, Sob or leg swelling denies any unusual bleeding or bruising.    2.Diabetes: Managed by PCP last A1C elevated but normally controled , Reports not as active this winter with his Hip pain    3. Essential HTN currently on Benicar 40mg daily blood pressure well controlled    4.Hyperlipidemia : LAST LDL 67mg/dl on 01/24/24 on Crestor 40 mg daily    5.  Pre-op Clearance: He is okay to proceed with surgery, He is able to accomplish >4 Metabolic Equivalents of exercise without symptoms of angina or dyspnea.         Follow up:1 year    Thank you very much for allowing me to participate in the care of your patient. Please do not hesitate to contact me if you have any questions.    Sincerely,    Luis Garay MD  Interventional Cardiology  2/19/2024  2:43 PM    [facility]  [address], [address]   [place] [address]  Ph: [phone]  Fax: [phone]

## 2024-02-19 ENCOUNTER — TELEPHONE (OUTPATIENT)
Dept: CARDIOLOGY CLINIC | Age: 64
End: 2024-02-19

## 2024-02-19 ENCOUNTER — OFFICE VISIT (OUTPATIENT)
Dept: CARDIOLOGY CLINIC | Age: 64
End: 2024-02-19
Payer: COMMERCIAL

## 2024-02-19 VITALS
HEART RATE: 97 BPM | WEIGHT: 204.6 LBS | BODY MASS INDEX: 27.71 KG/M2 | HEIGHT: 72 IN | SYSTOLIC BLOOD PRESSURE: 118 MMHG | OXYGEN SATURATION: 95 % | DIASTOLIC BLOOD PRESSURE: 70 MMHG

## 2024-02-19 DIAGNOSIS — I25.10 CORONARY ARTERY DISEASE INVOLVING NATIVE CORONARY ARTERY OF NATIVE HEART WITHOUT ANGINA PECTORIS: Primary | ICD-10-CM

## 2024-02-19 DIAGNOSIS — I10 ESSENTIAL HYPERTENSION: ICD-10-CM

## 2024-02-19 DIAGNOSIS — E78.5 HYPERLIPIDEMIA LDL GOAL <70: ICD-10-CM

## 2024-02-19 DIAGNOSIS — E13.9 DIABETES 1.5, MANAGED AS TYPE 1 (HCC): ICD-10-CM

## 2024-02-19 PROCEDURE — 3046F HEMOGLOBIN A1C LEVEL >9.0%: CPT | Performed by: STUDENT IN AN ORGANIZED HEALTH CARE EDUCATION/TRAINING PROGRAM

## 2024-02-19 PROCEDURE — 3074F SYST BP LT 130 MM HG: CPT | Performed by: STUDENT IN AN ORGANIZED HEALTH CARE EDUCATION/TRAINING PROGRAM

## 2024-02-19 PROCEDURE — 99214 OFFICE O/P EST MOD 30 MIN: CPT | Performed by: STUDENT IN AN ORGANIZED HEALTH CARE EDUCATION/TRAINING PROGRAM

## 2024-02-19 PROCEDURE — 3078F DIAST BP <80 MM HG: CPT | Performed by: STUDENT IN AN ORGANIZED HEALTH CARE EDUCATION/TRAINING PROGRAM

## 2024-02-19 NOTE — TELEPHONE ENCOUNTER
CARDIAC CLEARANCE     What type of procedure are you having?  Right total hip  Which physician is performing your procedure?  Dr. Misael Gates  When is your procedure scheduled for?  2/26/24  Where are you having this procedure?  [facility]  Are you taking Blood Thinners?   If so what? (Name/dose/frequesncy)  Asprin   Does the surgeon want you to stop your blood thinner?  If so for how long?  5 day prior  Phone Number and Contact Name for Physicians office:  [place] [phone] Opt#3  Fax number to send information:  [phone]

## 2024-02-21 ENCOUNTER — COMMUNITY OUTREACH (OUTPATIENT)
Dept: FAMILY MEDICINE CLINIC | Age: 64
End: 2024-02-21

## 2024-02-21 NOTE — PROGRESS NOTES
Patient's HM shows they are overdue for Colonoscopy   CareEverywhere and  files searched with success.  Results attached to order and HM updated

## 2024-03-07 ENCOUNTER — TELEPHONE (OUTPATIENT)
Dept: FAMILY MEDICINE CLINIC | Age: 64
End: 2024-03-07

## 2024-03-07 DIAGNOSIS — E11.59 HYPERTENSION ASSOCIATED WITH TYPE 2 DIABETES MELLITUS (HCC): Primary | ICD-10-CM

## 2024-03-07 DIAGNOSIS — I15.2 HYPERTENSION ASSOCIATED WITH TYPE 2 DIABETES MELLITUS (HCC): Primary | ICD-10-CM

## 2024-03-07 RX ORDER — GLIMEPIRIDE 2 MG/1
2 TABLET ORAL
Qty: 90 TABLET | Refills: 1 | Status: SHIPPED | OUTPATIENT
Start: 2024-03-07

## 2024-03-08 NOTE — TELEPHONE ENCOUNTER
Please inform pt that rx of 2 mg QD of Glimepiride was sent in. He told me he was taking 1/2 tab of 4 mg of Glimepiride.  Please encourage pt to schedule 3 mo follow-up appointment to check A1c. He should've been encouraged to schedule an appointment since he has none. I can place labs to be done at least 3 days prior to his visit with me. Thank you.    Shadi Zamora MD  Family Medicine  [facility]

## 2024-04-26 NOTE — TELEPHONE ENCOUNTER
Pt is requesting refill.  He states he saw Dr. Zamora in January, who advised him to start taking glimepiride again.  He still had some left at that time.  He states his sugar has drastically improved on it.    glimepiride (AMARYL) 4 MG tablet (Discontinued) 30 tablet 5 1/5/2023 6/30/2023    Sig - Route: Take 1 tablet by mouth every morning (before breakfast) - Oral      Last ordered 1/5/23    LOV:  1/24/24  NOV:  Not scheduled    [facility], [address] [place] [address]  P:  [phone]  F:  [phone]   normal...

## 2024-04-28 DIAGNOSIS — E11.69 TYPE 2 DIABETES MELLITUS WITH HYPERLIPIDEMIA (HCC): ICD-10-CM

## 2024-04-28 DIAGNOSIS — I25.10 CORONARY ARTERY DISEASE DUE TO TYPE 2 DIABETES MELLITUS (HCC): ICD-10-CM

## 2024-04-28 DIAGNOSIS — E11.59 CORONARY ARTERY DISEASE DUE TO TYPE 2 DIABETES MELLITUS (HCC): ICD-10-CM

## 2024-04-28 DIAGNOSIS — E78.5 TYPE 2 DIABETES MELLITUS WITH HYPERLIPIDEMIA (HCC): ICD-10-CM

## 2024-04-29 RX ORDER — ROSUVASTATIN CALCIUM 40 MG/1
40 TABLET, COATED ORAL NIGHTLY
Qty: 90 TABLET | Refills: 3 | Status: SHIPPED | OUTPATIENT
Start: 2024-04-29

## 2024-04-29 NOTE — TELEPHONE ENCOUNTER
Continued Stay SW/CM Assessment/Plan of Care Note       Active Substitute Decision Maker (SDM)    There are no active Substitute Decision Maker (SDM) on file.       Progress note:  residential facilities are still pending for private pay in the event pts son chooses hospice. Otherwise, if appropriate for yamil, will need updated PT/OT notes for ins auth. Restraints will need to be off for 24 hrs prior to dc. Sw to fu  
LRX: 4/13/23  LOV: 2/15/24 pre-op  NOV: 5/15/24  Last lab: 2/15/24    1mo pending.  
Detail Level: Zone
Detail Level: Generalized

## 2024-05-08 NOTE — PROGRESS NOTES
Kvng Garza   63 y.o. male   1960    HPI:    Patient was last seen by me on 2/15/2024.  During our last office visit, the main issue(s) that we focused on were:   -Pre-op - update: hip surgery not done b/c A1c was too high (9.2%).    Reason(s) for visit:   Chief Complaint   Patient presents with    Diabetes    3 Month Follow-Up     -Interval history-    [x] No new significant health event(s)/problem(s) occurred since our last office visit.    [x] No new health issues/concerns discussed during today's office visit.    [] New health issue(s)/concern(s):    [] Other noteworthy comment(s):     -Chronic health issue(s)-    CAD:  -S/P PCI of LAD on 8/23/2022.     Updates:  -He has been of both Aspirin and Clopidogrel since Aug 2023. He insisted not restart either one. He expressed issue of easy bruising and bleeding.  -Patient denied issues with frequent headache, chest pain, shortness of breath, palpitations, malaise, etc at home or at work.  -Has chronic right hip pain and was set for hip replacement, but was delayed due to his MI issue.  He plans to have surgery on this later in 2024.    Type II diabetes mellitus:  -Last A1c =      Lab Results   Component Value Date    LABA1C 6.5 05/15/2024    LABA1C 9.2 02/15/2024    LABA1C 9.7 01/24/2024           Latest Ref Rng & Units 2/15/2024     9:16 AM 1/24/2024     9:37 AM 10/13/2023     8:06 AM 4/13/2023     9:48 PM 12/19/2022     7:17 PM   Labs Renal   BUN 7 - 20 mg/dL 18  21  23  23  16    Cr 0.8 - 1.3 mg/dL 1.0  0.9  1.2  1.2  1.1    K 3.5 - 5.1 mmol/L 5.1  5.0  5.6  5.9  5.1    Na 136 - 145 mmol/L 142  141  138  142  138      Lab Results   Component Value Date/Time    MALBCR 101.9 07/29/2022 09:49 AM     Lab Results   Component Value Date    CHOL 132 01/24/2024    TRIG 94 01/24/2024    HDL 46 01/24/2024    LDL 67 01/24/2024    VLDL 19 01/24/2024     -Glucose readings (on average):   [] Fasting:   [] Lunchtime:   [] Dinnertime:   [] Hasn't checked glucose readings

## 2024-05-15 ENCOUNTER — OFFICE VISIT (OUTPATIENT)
Dept: FAMILY MEDICINE CLINIC | Age: 64
End: 2024-05-15
Payer: COMMERCIAL

## 2024-05-15 VITALS
SYSTOLIC BLOOD PRESSURE: 118 MMHG | TEMPERATURE: 93.3 F | BODY MASS INDEX: 28.13 KG/M2 | DIASTOLIC BLOOD PRESSURE: 74 MMHG | OXYGEN SATURATION: 98 % | HEART RATE: 66 BPM | WEIGHT: 207.4 LBS

## 2024-05-15 DIAGNOSIS — E11.69 TYPE 2 DIABETES MELLITUS WITH HYPERLIPIDEMIA (HCC): ICD-10-CM

## 2024-05-15 DIAGNOSIS — I15.2 HYPERTENSION ASSOCIATED WITH TYPE 2 DIABETES MELLITUS (HCC): Primary | ICD-10-CM

## 2024-05-15 DIAGNOSIS — E11.59 HYPERTENSION ASSOCIATED WITH TYPE 2 DIABETES MELLITUS (HCC): Primary | ICD-10-CM

## 2024-05-15 DIAGNOSIS — E11.59 CORONARY ARTERY DISEASE DUE TO TYPE 2 DIABETES MELLITUS (HCC): ICD-10-CM

## 2024-05-15 DIAGNOSIS — I25.10 CORONARY ARTERY DISEASE DUE TO TYPE 2 DIABETES MELLITUS (HCC): ICD-10-CM

## 2024-05-15 DIAGNOSIS — E78.5 TYPE 2 DIABETES MELLITUS WITH HYPERLIPIDEMIA (HCC): ICD-10-CM

## 2024-05-15 LAB — HBA1C MFR BLD: 6.5 %

## 2024-05-15 PROCEDURE — 3078F DIAST BP <80 MM HG: CPT | Performed by: FAMILY MEDICINE

## 2024-05-15 PROCEDURE — 3044F HG A1C LEVEL LT 7.0%: CPT | Performed by: FAMILY MEDICINE

## 2024-05-15 PROCEDURE — 83036 HEMOGLOBIN GLYCOSYLATED A1C: CPT | Performed by: FAMILY MEDICINE

## 2024-05-15 PROCEDURE — 99213 OFFICE O/P EST LOW 20 MIN: CPT | Performed by: FAMILY MEDICINE

## 2024-05-15 PROCEDURE — 3074F SYST BP LT 130 MM HG: CPT | Performed by: FAMILY MEDICINE

## 2024-05-15 RX ORDER — OLMESARTAN MEDOXOMIL 40 MG/1
40 TABLET ORAL NIGHTLY
Qty: 90 TABLET | Refills: 3 | Status: SHIPPED | OUTPATIENT
Start: 2024-05-15

## 2024-05-15 RX ORDER — DAPAGLIFLOZIN AND METFORMIN HYDROCHLORIDE 5; 1000 MG/1; MG/1
1000 TABLET, FILM COATED, EXTENDED RELEASE ORAL 2 TIMES DAILY
Qty: 180 TABLET | Refills: 3 | Status: SHIPPED | OUTPATIENT
Start: 2024-05-15

## 2024-05-15 ASSESSMENT — ENCOUNTER SYMPTOMS
ABDOMINAL PAIN: 0
VOMITING: 0
CONSTIPATION: 0
DIARRHEA: 0
CHEST TIGHTNESS: 0
BLOOD IN STOOL: 0
RHINORRHEA: 0
ABDOMINAL DISTENTION: 0
BACK PAIN: 0
NAUSEA: 0
WHEEZING: 0
SHORTNESS OF BREATH: 0
COUGH: 0
TROUBLE SWALLOWING: 0

## 2024-05-16 NOTE — PROGRESS NOTES
after procedure.    There are no discontinued medications.    3. Prophylaxis for cardiac events with perioperative beta-blockers: none; Not indicated  ACC/AHA indications for pre-operative beta-blocker use:    Vascular surgery with history of positive stress test  Intermediate or high risk surgery with history of CAD   Intermediate or high risk surgery with multiple clinical predictors of CAD- 2 of the following: history of compensated or prior heart failure, history of cerebrovascular disease, DM, or renal insufficiency  Routine administration of higher-dose, long-acting metoprolol in beta-blocker-naïve patients on the day of surgery, and in the absence of dose titration is associated with an overall increase in mortality.  Beta-blockers should be started days to weeks prior to surgery and titrated to pulse < 70.    4. Deep vein thrombosis prophylaxis:   -Pharmacologic prophylaxis:  [] Eliquis - 2.5 mg twice daily for: [] at least 14 days or [x] at least 35 days - to be started at least 12 hours post-op    -or-    [] Xarelto - 10 mg once daily for: [] at least 14 days or [x] at least 35 days - to be started at least 12 hours post-op    Other options:  [] Warfarin -   [x] Other: Aspirin  [] None  -Counseled patient on ambulating as much as possible and minimizing sedentary time as well as discussed about moving lower extremities around while lying down to increase circulation.    5. Fall/safety precautions discussed (if applicable)    6. Antibiotic prophylaxis: none    7. Smoking cessation and education provided (if applicable)     8. Scanned pre-op form to media section and faxed to number listed on form (if applicable)    Pre-op assessment:     [x] Patient's condition is overall stable from cardiovascular perspective.  He otherwise has no new health issues of concern.  He saw cardiologist ([facility] - Dr. Garay) on 2/19/2024 and was given clearance to move forward with surgery.    Other: I discussed with

## 2024-05-20 ENCOUNTER — OFFICE VISIT (OUTPATIENT)
Dept: FAMILY MEDICINE CLINIC | Age: 64
End: 2024-05-20
Payer: COMMERCIAL

## 2024-05-20 VITALS
TEMPERATURE: 96.3 F | BODY MASS INDEX: 27.69 KG/M2 | SYSTOLIC BLOOD PRESSURE: 130 MMHG | OXYGEN SATURATION: 97 % | HEART RATE: 84 BPM | WEIGHT: 204.2 LBS | DIASTOLIC BLOOD PRESSURE: 86 MMHG

## 2024-05-20 DIAGNOSIS — Z01.818 PREOPERATIVE EXAMINATION: Primary | ICD-10-CM

## 2024-05-20 DIAGNOSIS — E66.3 OVERWEIGHT WITH BODY MASS INDEX (BMI) OF 27 TO 27.9 IN ADULT: ICD-10-CM

## 2024-05-20 DIAGNOSIS — E78.5 TYPE 2 DIABETES MELLITUS WITH HYPERLIPIDEMIA (HCC): ICD-10-CM

## 2024-05-20 DIAGNOSIS — I15.2 HYPERTENSION ASSOCIATED WITH TYPE 2 DIABETES MELLITUS (HCC): ICD-10-CM

## 2024-05-20 DIAGNOSIS — Z71.9 HEALTH EDUCATION/COUNSELING: ICD-10-CM

## 2024-05-20 DIAGNOSIS — E11.69 TYPE 2 DIABETES MELLITUS WITH HYPERLIPIDEMIA (HCC): ICD-10-CM

## 2024-05-20 DIAGNOSIS — M25.551 CHRONIC PAIN OF RIGHT HIP: ICD-10-CM

## 2024-05-20 DIAGNOSIS — I25.10 CORONARY ARTERY DISEASE DUE TO TYPE 2 DIABETES MELLITUS (HCC): ICD-10-CM

## 2024-05-20 DIAGNOSIS — E29.1 HYPOGONADISM IN MALE: ICD-10-CM

## 2024-05-20 DIAGNOSIS — E11.59 HYPERTENSION ASSOCIATED WITH TYPE 2 DIABETES MELLITUS (HCC): ICD-10-CM

## 2024-05-20 DIAGNOSIS — G89.29 CHRONIC PAIN OF RIGHT HIP: ICD-10-CM

## 2024-05-20 DIAGNOSIS — E11.59 CORONARY ARTERY DISEASE DUE TO TYPE 2 DIABETES MELLITUS (HCC): ICD-10-CM

## 2024-05-20 DIAGNOSIS — Z95.5 STATUS POST INSERTION OF DRUG-ELUTING STENT INTO LEFT ANTERIOR DESCENDING (LAD) ARTERY: ICD-10-CM

## 2024-05-20 LAB
ANION GAP SERPL CALCULATED.3IONS-SCNC: 13 MMOL/L (ref 3–16)
APTT BLD: 26.3 SEC (ref 22.1–36.4)
BASOPHILS # BLD: 0 K/UL (ref 0–0.2)
BASOPHILS NFR BLD: 0.6 %
BUN SERPL-MCNC: 22 MG/DL (ref 7–20)
CALCIUM SERPL-MCNC: 10.4 MG/DL (ref 8.3–10.6)
CHLORIDE SERPL-SCNC: 100 MMOL/L (ref 99–110)
CO2 SERPL-SCNC: 27 MMOL/L (ref 21–32)
CREAT SERPL-MCNC: 1 MG/DL (ref 0.8–1.3)
DEPRECATED RDW RBC AUTO: 13.2 % (ref 12.4–15.4)
EOSINOPHIL # BLD: 0.2 K/UL (ref 0–0.6)
EOSINOPHIL NFR BLD: 4 %
GFR SERPLBLD CREATININE-BSD FMLA CKD-EPI: 84 ML/MIN/{1.73_M2}
GLUCOSE SERPL-MCNC: 127 MG/DL (ref 70–99)
HCT VFR BLD AUTO: 46.3 % (ref 40.5–52.5)
HGB BLD-MCNC: 15.4 G/DL (ref 13.5–17.5)
INR PPP: 0.92 (ref 0.85–1.15)
LYMPHOCYTES # BLD: 1.3 K/UL (ref 1–5.1)
LYMPHOCYTES NFR BLD: 23.8 %
MCH RBC QN AUTO: 30.1 PG (ref 26–34)
MCHC RBC AUTO-ENTMCNC: 33.4 G/DL (ref 31–36)
MCV RBC AUTO: 90.2 FL (ref 80–100)
MONOCYTES # BLD: 0.5 K/UL (ref 0–1.3)
MONOCYTES NFR BLD: 8.8 %
NEUTROPHILS # BLD: 3.3 K/UL (ref 1.7–7.7)
NEUTROPHILS NFR BLD: 62.8 %
PLATELET # BLD AUTO: 207 K/UL (ref 135–450)
PMV BLD AUTO: 9.7 FL (ref 5–10.5)
POTASSIUM SERPL-SCNC: 5.4 MMOL/L (ref 3.5–5.1)
PROTHROMBIN TIME: 12.5 SEC (ref 11.9–14.9)
RBC # BLD AUTO: 5.13 M/UL (ref 4.2–5.9)
SODIUM SERPL-SCNC: 140 MMOL/L (ref 136–145)
WBC # BLD AUTO: 5.3 K/UL (ref 4–11)

## 2024-05-20 PROCEDURE — 3044F HG A1C LEVEL LT 7.0%: CPT | Performed by: FAMILY MEDICINE

## 2024-05-20 PROCEDURE — 3075F SYST BP GE 130 - 139MM HG: CPT | Performed by: FAMILY MEDICINE

## 2024-05-20 PROCEDURE — 93000 ELECTROCARDIOGRAM COMPLETE: CPT | Performed by: FAMILY MEDICINE

## 2024-05-20 PROCEDURE — 99213 OFFICE O/P EST LOW 20 MIN: CPT | Performed by: FAMILY MEDICINE

## 2024-05-20 PROCEDURE — 3079F DIAST BP 80-89 MM HG: CPT | Performed by: FAMILY MEDICINE

## 2024-05-21 ENCOUNTER — TELEPHONE (OUTPATIENT)
Dept: CARDIOLOGY CLINIC | Age: 64
End: 2024-05-21

## 2024-05-21 NOTE — TELEPHONE ENCOUNTER
CARDIAC CLEARANCE      What type of procedure are you having?  Total right hip replacement   Which physician is performing your procedure?  Dr. Misael Gates  When is your procedure scheduled for?  06/10/24  Where are you having this procedure?  [facility]  Are you taking Blood Thinners?              If so what? (Name/dose/frequesncy)  Asprin 81 MG              Does the surgeon want you to stop your blood thinner?  If so for how long?  7 days prior  Phone Number and Contact Name for Physicians office:  [place] [phone] Opt#3  Fax number to send information:  [phone]    2/26 procedure was cancelled so updated clearance is requested

## 2024-05-29 ENCOUNTER — TELEPHONE (OUTPATIENT)
Dept: FAMILY MEDICINE CLINIC | Age: 64
End: 2024-05-29

## 2024-05-29 NOTE — TELEPHONE ENCOUNTER
I spoke to Denise (LPN) about this that the pt is ok to move fwd with surgery.    Shadi Zamora MD  Family Medicine  [facility]

## 2024-05-29 NOTE — TELEPHONE ENCOUNTER
[facility] called stating they need in writing that pt is cleared by pcp it has to state medically cleared by pcp

## 2024-06-04 NOTE — TELEPHONE ENCOUNTER
Doretha from [facility] called to request the clearance on the Pt.  Pt procedure is on 06/10.  Please fax to:    Dr. Misael Gates  Phone:  [phone] option 3  Fax:  [phone]    Thank you

## 2024-06-05 NOTE — TELEPHONE ENCOUNTER
Per last visit with Dr. Garay his note included Pre-op Clearance: He is okay to proceed with surgery, He is able to accomplish >4 Metabolic Equivalents of exercise without symptoms of angina or dyspnea.     Please Fax letter.  Thank You

## 2024-07-30 NOTE — PROGRESS NOTES
[] Dinnertime:   [] Hasn't checked glucose readings frequently.  [x] No reported glucose readings.  -Glucose reading(s):  [] Low:  [] Max:  [] Other:  -Neuropathy symptoms: [] Yes [x] No  -Gastroparesis symptoms: [] Yes [x] No   -Visual disturbances: [] Yes [x] No  -Eye exam:   -Last one -  [] Wears glasses/contact lenses   -Medication adherence: [x] Yes [] No [] N/A  -Other comments:     HTN:  BP Readings from Last 3 Encounters:   08/07/24 120/60   05/20/24 130/86   05/15/24 118/74     -Patient has not been checking BP readings regularly.   -Patient denied issues with frequent headache, chest pain, shortness of breath, palpitations, malaise, etc.      No Known Allergies    Current Outpatient Medications on File Prior to Visit   Medication Sig Dispense Refill    XIGDUO XR 5-1000 MG TB24 Take 1,000 mg by mouth in the morning and at bedtime 180 tablet 3    olmesartan (BENICAR) 40 MG tablet Take 1 tablet by mouth nightly 90 tablet 3    rosuvastatin (CRESTOR) 40 MG tablet TAKE ONE TABLET BY MOUTH ONCE NIGHTLY 90 tablet 3    glipiZIDE (GLUCOTROL XL) 5 MG extended release tablet Take 1 tablet by mouth daily 90 tablet 3    aspirin 81 MG chewable tablet Take 1 tablet by mouth daily       No current facility-administered medications on file prior to visit.        Family History   Problem Relation Age of Onset    Cancer Mother     Diabetes Father     Kidney Disease Father     Heart Attack Father     Heart Attack Brother        Social History     Tobacco Use    Smoking status: Never    Smokeless tobacco: Current     Types: Snuff   Substance Use Topics    Alcohol use: Yes     Comment: occassionaly        Lab Results   Component Value Date    WBC 5.3 05/20/2024    HGB 15.4 05/20/2024    HCT 46.3 05/20/2024    MCV 90.2 05/20/2024     05/20/2024         Chemistry        Component Value Date/Time     05/20/2024 1102    K 5.4 (H) 05/20/2024 1102    K 4.7 08/23/2022 0528     05/20/2024 1102    CO2 27 05/20/2024

## 2024-08-07 ENCOUNTER — OFFICE VISIT (OUTPATIENT)
Dept: FAMILY MEDICINE CLINIC | Age: 64
End: 2024-08-07
Payer: COMMERCIAL

## 2024-08-07 VITALS — DIASTOLIC BLOOD PRESSURE: 60 MMHG | WEIGHT: 204.8 LBS | BODY MASS INDEX: 27.78 KG/M2 | SYSTOLIC BLOOD PRESSURE: 120 MMHG

## 2024-08-07 DIAGNOSIS — Z96.641 STATUS POST HIP REPLACEMENT, RIGHT: ICD-10-CM

## 2024-08-07 DIAGNOSIS — I25.10 CORONARY ARTERY DISEASE DUE TO TYPE 2 DIABETES MELLITUS (HCC): ICD-10-CM

## 2024-08-07 DIAGNOSIS — E11.59 CORONARY ARTERY DISEASE DUE TO TYPE 2 DIABETES MELLITUS (HCC): ICD-10-CM

## 2024-08-07 DIAGNOSIS — Z95.5 STATUS POST INSERTION OF DRUG-ELUTING STENT INTO LEFT ANTERIOR DESCENDING (LAD) ARTERY: ICD-10-CM

## 2024-08-07 DIAGNOSIS — M17.11 ARTHRITIS OF RIGHT KNEE: ICD-10-CM

## 2024-08-07 DIAGNOSIS — E11.59 HYPERTENSION ASSOCIATED WITH TYPE 2 DIABETES MELLITUS (HCC): Primary | ICD-10-CM

## 2024-08-07 DIAGNOSIS — E11.69 TYPE 2 DIABETES MELLITUS WITH HYPERLIPIDEMIA (HCC): ICD-10-CM

## 2024-08-07 DIAGNOSIS — E11.59 HYPERTENSION ASSOCIATED WITH TYPE 2 DIABETES MELLITUS (HCC): ICD-10-CM

## 2024-08-07 DIAGNOSIS — E78.5 TYPE 2 DIABETES MELLITUS WITH HYPERLIPIDEMIA (HCC): ICD-10-CM

## 2024-08-07 DIAGNOSIS — I15.2 HYPERTENSION ASSOCIATED WITH TYPE 2 DIABETES MELLITUS (HCC): ICD-10-CM

## 2024-08-07 DIAGNOSIS — M79.674 PAIN OF RIGHT GREAT TOE: ICD-10-CM

## 2024-08-07 DIAGNOSIS — I15.2 HYPERTENSION ASSOCIATED WITH TYPE 2 DIABETES MELLITUS (HCC): Primary | ICD-10-CM

## 2024-08-07 LAB — HBA1C MFR BLD: 7 %

## 2024-08-07 PROCEDURE — 3044F HG A1C LEVEL LT 7.0%: CPT | Performed by: FAMILY MEDICINE

## 2024-08-07 PROCEDURE — 3074F SYST BP LT 130 MM HG: CPT | Performed by: FAMILY MEDICINE

## 2024-08-07 PROCEDURE — 3078F DIAST BP <80 MM HG: CPT | Performed by: FAMILY MEDICINE

## 2024-08-07 PROCEDURE — 99213 OFFICE O/P EST LOW 20 MIN: CPT | Performed by: FAMILY MEDICINE

## 2024-08-07 PROCEDURE — 83036 HEMOGLOBIN GLYCOSYLATED A1C: CPT | Performed by: FAMILY MEDICINE

## 2024-08-07 SDOH — ECONOMIC STABILITY: FOOD INSECURITY: WITHIN THE PAST 12 MONTHS, YOU WORRIED THAT YOUR FOOD WOULD RUN OUT BEFORE YOU GOT MONEY TO BUY MORE.: NEVER TRUE

## 2024-08-07 SDOH — ECONOMIC STABILITY: FOOD INSECURITY: WITHIN THE PAST 12 MONTHS, THE FOOD YOU BOUGHT JUST DIDN'T LAST AND YOU DIDN'T HAVE MONEY TO GET MORE.: NEVER TRUE

## 2024-08-07 SDOH — ECONOMIC STABILITY: INCOME INSECURITY: HOW HARD IS IT FOR YOU TO PAY FOR THE VERY BASICS LIKE FOOD, HOUSING, MEDICAL CARE, AND HEATING?: NOT HARD AT ALL

## 2024-08-07 SDOH — ECONOMIC STABILITY: HOUSING INSECURITY
IN THE LAST 12 MONTHS, WAS THERE A TIME WHEN YOU DID NOT HAVE A STEADY PLACE TO SLEEP OR SLEPT IN A SHELTER (INCLUDING NOW)?: NO

## 2024-08-07 ASSESSMENT — ENCOUNTER SYMPTOMS
BLOOD IN STOOL: 0
CONSTIPATION: 0
DIARRHEA: 0
NAUSEA: 0
SHORTNESS OF BREATH: 0
ABDOMINAL DISTENTION: 0
CHEST TIGHTNESS: 0
WHEEZING: 0
RHINORRHEA: 0
SINUS PRESSURE: 0
COUGH: 0
TROUBLE SWALLOWING: 0
ABDOMINAL PAIN: 0
VOMITING: 0
BACK PAIN: 0

## 2024-11-06 ENCOUNTER — OFFICE VISIT (OUTPATIENT)
Dept: FAMILY MEDICINE CLINIC | Age: 64
End: 2024-11-06
Payer: COMMERCIAL

## 2024-11-06 VITALS
DIASTOLIC BLOOD PRESSURE: 62 MMHG | TEMPERATURE: 97.2 F | SYSTOLIC BLOOD PRESSURE: 138 MMHG | WEIGHT: 216.4 LBS | OXYGEN SATURATION: 95 % | HEART RATE: 53 BPM | BODY MASS INDEX: 29.35 KG/M2

## 2024-11-06 DIAGNOSIS — I25.10 CORONARY ARTERY DISEASE DUE TO TYPE 2 DIABETES MELLITUS (HCC): ICD-10-CM

## 2024-11-06 DIAGNOSIS — E11.59 HYPERTENSION ASSOCIATED WITH TYPE 2 DIABETES MELLITUS (HCC): Primary | ICD-10-CM

## 2024-11-06 DIAGNOSIS — E11.69 TYPE 2 DIABETES MELLITUS WITH HYPERLIPIDEMIA (HCC): ICD-10-CM

## 2024-11-06 DIAGNOSIS — Z96.641 STATUS POST HIP REPLACEMENT, RIGHT: ICD-10-CM

## 2024-11-06 DIAGNOSIS — I15.2 HYPERTENSION ASSOCIATED WITH TYPE 2 DIABETES MELLITUS (HCC): Primary | ICD-10-CM

## 2024-11-06 DIAGNOSIS — E11.59 CORONARY ARTERY DISEASE DUE TO TYPE 2 DIABETES MELLITUS (HCC): ICD-10-CM

## 2024-11-06 DIAGNOSIS — E78.5 TYPE 2 DIABETES MELLITUS WITH HYPERLIPIDEMIA (HCC): ICD-10-CM

## 2024-11-06 DIAGNOSIS — E66.3 OVERWEIGHT WITH BODY MASS INDEX (BMI) OF 29 TO 29.9 IN ADULT: ICD-10-CM

## 2024-11-06 DIAGNOSIS — Z95.5 STATUS POST INSERTION OF DRUG-ELUTING STENT INTO LEFT ANTERIOR DESCENDING (LAD) ARTERY: ICD-10-CM

## 2024-11-06 LAB — HBA1C MFR BLD: 7.8 %

## 2024-11-06 PROCEDURE — 3051F HG A1C>EQUAL 7.0%<8.0%: CPT | Performed by: FAMILY MEDICINE

## 2024-11-06 PROCEDURE — 3078F DIAST BP <80 MM HG: CPT | Performed by: FAMILY MEDICINE

## 2024-11-06 PROCEDURE — 3075F SYST BP GE 130 - 139MM HG: CPT | Performed by: FAMILY MEDICINE

## 2024-11-06 PROCEDURE — 99214 OFFICE O/P EST MOD 30 MIN: CPT | Performed by: FAMILY MEDICINE

## 2024-11-06 PROCEDURE — 83036 HEMOGLOBIN GLYCOSYLATED A1C: CPT | Performed by: FAMILY MEDICINE

## 2024-11-06 RX ORDER — GLIPIZIDE 10 MG/1
10 TABLET, FILM COATED, EXTENDED RELEASE ORAL DAILY
Qty: 90 TABLET | Refills: 3 | Status: SHIPPED | OUTPATIENT
Start: 2024-11-06

## 2024-11-06 ASSESSMENT — ENCOUNTER SYMPTOMS
WHEEZING: 0
BLOOD IN STOOL: 0
CONSTIPATION: 0
NAUSEA: 0
BACK PAIN: 0
TROUBLE SWALLOWING: 0
SHORTNESS OF BREATH: 0
CHEST TIGHTNESS: 0
RHINORRHEA: 0
COUGH: 0
DIARRHEA: 0
VOMITING: 0
ABDOMINAL DISTENTION: 0
SINUS PRESSURE: 0
ABDOMINAL PAIN: 0

## 2024-11-07 ENCOUNTER — TELEPHONE (OUTPATIENT)
Dept: ADMINISTRATIVE | Age: 64
End: 2024-11-07

## 2024-11-07 NOTE — TELEPHONE ENCOUNTER
Submitted PA for glipiZIDE ER 10MG er tablets  Via CM Key: [id number] STATUS: PENDING.    Follow up done daily; if no decision with in three days we will refax.  If another three days goes by with no decision will call the insurance for status.

## 2024-11-08 NOTE — TELEPHONE ENCOUNTER
APPROVAL for glipiZIDE ER 10MG er tablets 11/07/24-11/07/25; letter attached.    If this requires a response please respond to the pool ( P MHCX [facility] MEDICATION PRE-AUTH).      Thank you please advise patient.

## 2025-01-15 NOTE — PROGRESS NOTES
Kvng Garza   64 y.o. male   1960    HPI:    Patient was last seen by me on 11/6/2024.  During our last office visit, the main issue(s) that we focused on were:     Hypertension associated with type 2 diabetes mellitus (HCC)  Comments:  BP - controlled on current regiment. DM - uncontrolled. Will continue Xigduo XR and increase Glipizide XL from 5 to 10 mg.  Orders:  -     glipiZIDE (GLUCOTROL XL) 10 MG extended release tablet; Take 1 tablet by mouth daily    Reason(s) for visit:   Chief Complaint   Patient presents with    Follow-up Chronic Condition    Hypertension    Diabetes     -Interval history-    [x] New health issue(s)/concern(s)/update(s):    Pt reported of weaker urinary stream. Otherwise no other BPH issues.    Pt plans to retire around 67 yo.    [] No new significant health event(s)/problem(s) occurred since our last office visit.    [x] No new health issues/concerns discussed during today's office visit.    [] Other noteworthy comment(s):     -Chronic health issue(s)-    CAD:  -S/P PCI of LAD on 8/23/2022.     Updates:  -He has been compliant with his Aspirin.  -Patient denied issues with frequent headache, chest pain, shortness of breath, palpitations, malaise, etc at rest or with exertion.     Type II diabetes mellitus:  -Last A1c =      Lab Results   Component Value Date    LABA1C 7.8 11/06/2024    LABA1C 7.0 08/07/2024    LABA1C 6.5 05/15/2024           Latest Ref Rng & Units 5/20/2024    11:02 AM 2/15/2024     9:16 AM 1/24/2024     9:37 AM 10/13/2023     8:06 AM 4/13/2023     9:48 PM   Labs Renal   BUN 7 - 20 mg/dL 22  18  21  23  23    Cr 0.8 - 1.3 mg/dL 1.0  1.0  0.9  1.2  1.2    K 3.5 - 5.1 mmol/L 5.4  5.1  5.0  5.6  5.9    Na 136 - 145 mmol/L 140  142  141  138  142        Lab Results   Component Value Date/Time    ALBCREAT 101.9 07/29/2022 09:49 AM       Lab Results   Component Value Date    CHOL 132 01/24/2024    TRIG 94 01/24/2024    HDL 46 01/24/2024    LDL 67 01/24/2024    VLDL

## 2025-02-05 ENCOUNTER — OFFICE VISIT (OUTPATIENT)
Dept: FAMILY MEDICINE CLINIC | Age: 65
End: 2025-02-05

## 2025-02-05 VITALS — SYSTOLIC BLOOD PRESSURE: 125 MMHG | BODY MASS INDEX: 28.97 KG/M2 | DIASTOLIC BLOOD PRESSURE: 75 MMHG | WEIGHT: 213.6 LBS

## 2025-02-05 DIAGNOSIS — E11.69 TYPE 2 DIABETES MELLITUS WITH HYPERLIPIDEMIA (HCC): ICD-10-CM

## 2025-02-05 DIAGNOSIS — Z12.5 SCREENING FOR MALIGNANT NEOPLASM OF PROSTATE: ICD-10-CM

## 2025-02-05 DIAGNOSIS — Z71.9 HEALTH EDUCATION/COUNSELING: ICD-10-CM

## 2025-02-05 DIAGNOSIS — I25.10 CORONARY ARTERY DISEASE DUE TO TYPE 2 DIABETES MELLITUS (HCC): ICD-10-CM

## 2025-02-05 DIAGNOSIS — I15.2 HYPERTENSION ASSOCIATED WITH TYPE 2 DIABETES MELLITUS (HCC): ICD-10-CM

## 2025-02-05 DIAGNOSIS — E11.59 CORONARY ARTERY DISEASE DUE TO TYPE 2 DIABETES MELLITUS (HCC): Primary | ICD-10-CM

## 2025-02-05 DIAGNOSIS — E11.59 HYPERTENSION ASSOCIATED WITH TYPE 2 DIABETES MELLITUS (HCC): ICD-10-CM

## 2025-02-05 DIAGNOSIS — I25.10 CORONARY ARTERY DISEASE DUE TO TYPE 2 DIABETES MELLITUS (HCC): Primary | ICD-10-CM

## 2025-02-05 DIAGNOSIS — E78.5 TYPE 2 DIABETES MELLITUS WITH HYPERLIPIDEMIA (HCC): ICD-10-CM

## 2025-02-05 DIAGNOSIS — E11.59 CORONARY ARTERY DISEASE DUE TO TYPE 2 DIABETES MELLITUS (HCC): ICD-10-CM

## 2025-02-05 LAB
ALBUMIN SERPL-MCNC: 4.5 G/DL (ref 3.4–5)
ALBUMIN/GLOB SERPL: 2 {RATIO} (ref 1.1–2.2)
ALP SERPL-CCNC: 66 U/L (ref 40–129)
ALT SERPL-CCNC: 19 U/L (ref 10–40)
ANION GAP SERPL CALCULATED.3IONS-SCNC: 10 MMOL/L (ref 3–16)
AST SERPL-CCNC: 22 U/L (ref 15–37)
BACTERIA URNS QL MICRO: NORMAL /HPF
BILIRUB SERPL-MCNC: 0.3 MG/DL (ref 0–1)
BILIRUB UR QL STRIP.AUTO: NEGATIVE
BUN SERPL-MCNC: 19 MG/DL (ref 7–20)
CALCIUM SERPL-MCNC: 9.7 MG/DL (ref 8.3–10.6)
CHLORIDE SERPL-SCNC: 105 MMOL/L (ref 99–110)
CHOLEST SERPL-MCNC: 101 MG/DL (ref 0–199)
CLARITY UR: CLEAR
CO2 SERPL-SCNC: 27 MMOL/L (ref 21–32)
COLOR UR: YELLOW
CREAT SERPL-MCNC: 1 MG/DL (ref 0.8–1.3)
CREAT UR-MCNC: 130 MG/DL (ref 39–259)
EPI CELLS #/AREA URNS AUTO: 0 /HPF (ref 0–5)
EST. AVERAGE GLUCOSE BLD GHB EST-MCNC: 165.7 MG/DL
GFR SERPLBLD CREATININE-BSD FMLA CKD-EPI: 84 ML/MIN/{1.73_M2}
GLUCOSE SERPL-MCNC: 125 MG/DL (ref 70–99)
GLUCOSE UR STRIP.AUTO-MCNC: >=1000 MG/DL
HBA1C MFR BLD: 7.4 %
HDLC SERPL-MCNC: 29 MG/DL (ref 40–60)
HGB UR QL STRIP.AUTO: NEGATIVE
HYALINE CASTS #/AREA URNS AUTO: 0 /LPF (ref 0–8)
KETONES UR STRIP.AUTO-MCNC: NEGATIVE MG/DL
LDLC SERPL CALC-MCNC: 51 MG/DL
LEUKOCYTE ESTERASE UR QL STRIP.AUTO: NEGATIVE
MICROALBUMIN UR DL<=1MG/L-MCNC: 17.9 MG/DL
MICROALBUMIN/CREAT UR: 137.7 MG/G (ref 0–30)
NITRITE UR QL STRIP.AUTO: NEGATIVE
PH UR STRIP.AUTO: 5.5 [PH] (ref 5–8)
POTASSIUM SERPL-SCNC: 5.7 MMOL/L (ref 3.5–5.1)
PROT SERPL-MCNC: 6.7 G/DL (ref 6.4–8.2)
PROT UR STRIP.AUTO-MCNC: 30 MG/DL
PSA SERPL DL<=0.01 NG/ML-MCNC: 3.73 NG/ML (ref 0–4)
RBC CLUMPS #/AREA URNS AUTO: 1 /HPF (ref 0–4)
SODIUM SERPL-SCNC: 142 MMOL/L (ref 136–145)
SP GR UR STRIP.AUTO: 1.04 (ref 1–1.03)
TRIGL SERPL-MCNC: 106 MG/DL (ref 0–150)
UA COMPLETE W REFLEX CULTURE PNL UR: ABNORMAL
UA DIPSTICK W REFLEX MICRO PNL UR: YES
URN SPEC COLLECT METH UR: ABNORMAL
UROBILINOGEN UR STRIP-ACNC: 1 E.U./DL
VLDLC SERPL CALC-MCNC: 21 MG/DL
WBC #/AREA URNS AUTO: 1 /HPF (ref 0–5)

## 2025-02-05 ASSESSMENT — ENCOUNTER SYMPTOMS
RHINORRHEA: 0
BACK PAIN: 0
DIARRHEA: 0
COUGH: 0
SINUS PRESSURE: 0
VOMITING: 0
SHORTNESS OF BREATH: 0
NAUSEA: 0
CHEST TIGHTNESS: 0
TROUBLE SWALLOWING: 0
ABDOMINAL PAIN: 0
CONSTIPATION: 0
ABDOMINAL DISTENTION: 0
WHEEZING: 0
BLOOD IN STOOL: 0

## 2025-02-07 ENCOUNTER — PATIENT MESSAGE (OUTPATIENT)
Dept: FAMILY MEDICINE CLINIC | Age: 65
End: 2025-02-07

## 2025-02-07 ENCOUNTER — OFFICE VISIT (OUTPATIENT)
Dept: FAMILY MEDICINE CLINIC | Age: 65
End: 2025-02-07

## 2025-02-07 VITALS
HEART RATE: 91 BPM | SYSTOLIC BLOOD PRESSURE: 120 MMHG | OXYGEN SATURATION: 96 % | DIASTOLIC BLOOD PRESSURE: 80 MMHG | BODY MASS INDEX: 28.75 KG/M2 | WEIGHT: 212 LBS

## 2025-02-07 DIAGNOSIS — E87.5 HYPERKALEMIA: ICD-10-CM

## 2025-02-07 DIAGNOSIS — I25.10 CORONARY ARTERY DISEASE DUE TO TYPE 2 DIABETES MELLITUS (HCC): ICD-10-CM

## 2025-02-07 DIAGNOSIS — R59.0 ANTERIOR CERVICAL ADENOPATHY: Primary | ICD-10-CM

## 2025-02-07 DIAGNOSIS — J01.90 ACUTE NON-RECURRENT SINUSITIS, UNSPECIFIED LOCATION: ICD-10-CM

## 2025-02-07 DIAGNOSIS — E11.59 HYPERTENSION ASSOCIATED WITH TYPE 2 DIABETES MELLITUS (HCC): ICD-10-CM

## 2025-02-07 DIAGNOSIS — R80.9 TYPE 2 DIABETES MELLITUS WITH ALBUMINURIA (HCC): ICD-10-CM

## 2025-02-07 DIAGNOSIS — N18.1 CKD STAGE 1 DUE TO TYPE 2 DIABETES MELLITUS (HCC): ICD-10-CM

## 2025-02-07 DIAGNOSIS — E11.59 CORONARY ARTERY DISEASE DUE TO TYPE 2 DIABETES MELLITUS (HCC): ICD-10-CM

## 2025-02-07 DIAGNOSIS — I15.2 HYPERTENSION ASSOCIATED WITH TYPE 2 DIABETES MELLITUS (HCC): ICD-10-CM

## 2025-02-07 DIAGNOSIS — J02.9 SORE THROAT: ICD-10-CM

## 2025-02-07 DIAGNOSIS — E11.22 CKD STAGE 1 DUE TO TYPE 2 DIABETES MELLITUS (HCC): ICD-10-CM

## 2025-02-07 DIAGNOSIS — E11.29 TYPE 2 DIABETES MELLITUS WITH ALBUMINURIA (HCC): ICD-10-CM

## 2025-02-07 LAB — S PYO AG THROAT QL: NORMAL

## 2025-02-07 RX ORDER — DEXAMETHASONE 2 MG/1
TABLET ORAL
Qty: 5 TABLET | Refills: 0 | Status: SHIPPED | OUTPATIENT
Start: 2025-02-07

## 2025-02-07 RX ORDER — SEMAGLUTIDE 0.68 MG/ML
0.25 INJECTION, SOLUTION SUBCUTANEOUS WEEKLY
Qty: 6 ML | Refills: 0 | Status: SHIPPED | OUTPATIENT
Start: 2025-02-07

## 2025-02-07 SDOH — ECONOMIC STABILITY: FOOD INSECURITY: WITHIN THE PAST 12 MONTHS, THE FOOD YOU BOUGHT JUST DIDN'T LAST AND YOU DIDN'T HAVE MONEY TO GET MORE.: NEVER TRUE

## 2025-02-07 SDOH — ECONOMIC STABILITY: FOOD INSECURITY: WITHIN THE PAST 12 MONTHS, YOU WORRIED THAT YOUR FOOD WOULD RUN OUT BEFORE YOU GOT MONEY TO BUY MORE.: NEVER TRUE

## 2025-02-07 ASSESSMENT — ENCOUNTER SYMPTOMS
ABDOMINAL PAIN: 0
CHEST TIGHTNESS: 0
ABDOMINAL DISTENTION: 0
NAUSEA: 0
WHEEZING: 0
CONSTIPATION: 0
TROUBLE SWALLOWING: 0
VOMITING: 0
BLOOD IN STOOL: 0
BACK PAIN: 0
SINUS PRESSURE: 1
SHORTNESS OF BREATH: 0
COUGH: 1
DIARRHEA: 0
RHINORRHEA: 1

## 2025-02-07 ASSESSMENT — PATIENT HEALTH QUESTIONNAIRE - PHQ9
SUM OF ALL RESPONSES TO PHQ QUESTIONS 1-9: 0
SUM OF ALL RESPONSES TO PHQ QUESTIONS 1-9: 0
1. LITTLE INTEREST OR PLEASURE IN DOING THINGS: NOT AT ALL
SUM OF ALL RESPONSES TO PHQ QUESTIONS 1-9: 0
SUM OF ALL RESPONSES TO PHQ9 QUESTIONS 1 & 2: 0
2. FEELING DOWN, DEPRESSED OR HOPELESS: NOT AT ALL
SUM OF ALL RESPONSES TO PHQ QUESTIONS 1-9: 0

## 2025-02-07 NOTE — PROGRESS NOTES
Kvng Garza   64 y.o. male   1960    HPI:    Patient was last seen by me on 2/5/2025.      Reason(s) for visit:   Chief Complaint   Patient presents with    Sore Throat    Facial Swelling     Right side of jaw swelling      -Interval history-    [x] New health issue(s)/concern(s)/update(s):    Pt woke up yesterday with right sided medial jaw pain.  He reported of tenderness of the area upon palpation and some dysphagia.  He reported of 1 week hx of sinus pressure, post-nasal drip, and productive cough, which has improved, but not resolved.  He denied fever, chills, myalgia.  His household members are fine.  He has no hx of recurrent sinus infection or overall illnesses.    We also reviewed lab work below.    Orders Only on 02/05/2025   Component Date Value Ref Range Status    Sodium 02/05/2025 142  136 - 145 mmol/L Final    Potassium 02/05/2025 5.7 (H)  3.5 - 5.1 mmol/L Final    Chloride 02/05/2025 105  99 - 110 mmol/L Final    CO2 02/05/2025 27  21 - 32 mmol/L Final    Anion Gap 02/05/2025 10  3 - 16 Final    Glucose 02/05/2025 125 (H)  70 - 99 mg/dL Final    BUN 02/05/2025 19  7 - 20 mg/dL Final    Creatinine 02/05/2025 1.0  0.8 - 1.3 mg/dL Final    Est, Glom Filt Rate 02/05/2025 84  >60 Final    Comment: Pediatric calculator link  https://www.kidney.org/professionals/kdoqi/gfr_calculatorped  Effective Oct 3, 2022  These results are not intended for use in patients  <18 years of age.  eGFR results are calculated without  a race factor using the 2021 CKD-EPI equation.  Careful  clinical correlation is recommended, particularly when  comparing to results calculated using previous equations.  The CKD-EPI equation is less accurate in patients with  extremes of muscle mass, extra-renal metabolism of  creatinine, excessive creatinine ingestion, or following  therapy that affects renal tubular secretion.      Calcium 02/05/2025 9.7  8.3 - 10.6 mg/dL Final    Total Protein 02/05/2025 6.7  6.4 - 8.2 g/dL Final

## 2025-02-11 ENCOUNTER — TELEPHONE (OUTPATIENT)
Dept: ADMINISTRATIVE | Age: 65
End: 2025-02-11

## 2025-02-11 NOTE — TELEPHONE ENCOUNTER
Submitted PA for Ozempic (0.25 or 0.5 MG/DOSE) 2MG/3ML pen-injectors  Via [facility] [id number] STATUS: PENDING.    Follow up done daily; if no decision with in three days we will refax.  If another three days goes by with no decision will call the insurance for status.

## 2025-02-12 ENCOUNTER — PATIENT MESSAGE (OUTPATIENT)
Dept: FAMILY MEDICINE CLINIC | Age: 65
End: 2025-02-12

## 2025-02-12 NOTE — TELEPHONE ENCOUNTER
APPROVAL for Ozempic (0.25 or 0.5 MG/DOSE) 2MG/3ML pen-injectors 02/11/25-02/11/26; letter attached.    If this requires a response please respond to the pool ( P MHCX PSC MEDICATION PRE-AUTH).      Thank you please advise patient.

## 2025-03-27 DIAGNOSIS — E11.29 TYPE 2 DIABETES MELLITUS WITH ALBUMINURIA (HCC): ICD-10-CM

## 2025-03-27 DIAGNOSIS — E11.59 CORONARY ARTERY DISEASE DUE TO TYPE 2 DIABETES MELLITUS (HCC): ICD-10-CM

## 2025-03-27 DIAGNOSIS — I25.10 CORONARY ARTERY DISEASE DUE TO TYPE 2 DIABETES MELLITUS (HCC): ICD-10-CM

## 2025-03-27 DIAGNOSIS — N18.1 CKD STAGE 1 DUE TO TYPE 2 DIABETES MELLITUS (HCC): ICD-10-CM

## 2025-03-27 DIAGNOSIS — E11.22 CKD STAGE 1 DUE TO TYPE 2 DIABETES MELLITUS (HCC): ICD-10-CM

## 2025-03-27 DIAGNOSIS — E11.59 HYPERTENSION ASSOCIATED WITH TYPE 2 DIABETES MELLITUS: ICD-10-CM

## 2025-03-27 DIAGNOSIS — I15.2 HYPERTENSION ASSOCIATED WITH TYPE 2 DIABETES MELLITUS: ICD-10-CM

## 2025-03-27 DIAGNOSIS — R80.9 TYPE 2 DIABETES MELLITUS WITH ALBUMINURIA (HCC): ICD-10-CM

## 2025-03-27 RX ORDER — SEMAGLUTIDE 0.68 MG/ML
0.5 INJECTION, SOLUTION SUBCUTANEOUS WEEKLY
Qty: 6 ML | Refills: 0 | Status: SHIPPED | OUTPATIENT
Start: 2025-03-27

## 2025-03-27 NOTE — TELEPHONE ENCOUNTER
Pt called for a refill stated he is out and pharmacy told him it should last till 4-13-24.    Semaglutide,0.25 or 0.5MG/DOS, (OZEMPIC, 0.25 OR 0.5 MG/DOSE,) 2 MG/3ML SOPN [[id number]]    Order Details  Dose: 0.25 mg Route: SubCUTAneous Frequency: WEEKLY   Dispense Quantity: 6 mL Refills: 0            [facility] [id number] - [place], [place] - [address] [place] [address] [phone] -  [phone]  [address] [place] [id number]  Phone: [phone]  Fax: [phone]

## 2025-03-27 NOTE — TELEPHONE ENCOUNTER
Please let pt know that I went ahead and increased him to the next dose (0.5 mg) of Ozempic. The initial dose is the \"primer dose\".    Shadi Zamora MD  Family Medicine  [facility]

## 2025-04-22 NOTE — PROGRESS NOTES
Kvng Garza   64 y.o. male   1960    HPI:    Patient was last seen by me on 2/7/2025.  During our last office visit, the main issue(s) that we focused on were:     Anterior cervical adenopathy  Comments:  Assured pt that mass palpated is likely a benign and reactive lymphadenopathy 2/2 to infection. Advised to if issue persists > 4 weeks then to return to me. No other palpable lymph nodes on exam.     Acute non-recurrent sinusitis, unspecified location  -     amoxicillin-clavulanate (AUGMENTIN) 875-125 MG per tablet; Take 1 tablet by mouth in the morning and 1 tablet in the evening. Do all this for 7 days.  -     dexAMETHasone (DECADRON) 2 MG tablet; Take 5 tablets PO x1     Sore throat  Comments:  Strep throat test - negative  Orders:  -     POCT rapid strep A     CKD stage 1 due to type 2 diabetes mellitus (HCC)  -     Semaglutide,0.25 or 0.5MG/DOS, (OZEMPIC, 0.25 OR 0.5 MG/DOSE,) 2 MG/3ML SOPN; Inject 0.25 mg into the skin once a week     Coronary artery disease due to type 2 diabetes mellitus (HCC)  -     Semaglutide,0.25 or 0.5MG/DOS, (OZEMPIC, 0.25 OR 0.5 MG/DOSE,) 2 MG/3ML SOPN; Inject 0.25 mg into the skin once a week     Hypertension associated with type 2 diabetes mellitus (HCC)  -     Semaglutide,0.25 or 0.5MG/DOS, (OZEMPIC, 0.25 OR 0.5 MG/DOSE,) 2 MG/3ML SOPN; Inject 0.25 mg into the skin once a week     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 He recently had it aspirated on

## 2025-04-27 DIAGNOSIS — I25.10 CORONARY ARTERY DISEASE DUE TO TYPE 2 DIABETES MELLITUS (HCC): ICD-10-CM

## 2025-04-27 DIAGNOSIS — E11.69 TYPE 2 DIABETES MELLITUS WITH HYPERLIPIDEMIA (HCC): ICD-10-CM

## 2025-04-27 DIAGNOSIS — E78.5 TYPE 2 DIABETES MELLITUS WITH HYPERLIPIDEMIA (HCC): ICD-10-CM

## 2025-04-27 DIAGNOSIS — E11.59 CORONARY ARTERY DISEASE DUE TO TYPE 2 DIABETES MELLITUS (HCC): ICD-10-CM

## 2025-04-28 RX ORDER — ROSUVASTATIN CALCIUM 40 MG/1
40 TABLET, COATED ORAL NIGHTLY
Qty: 90 TABLET | Refills: 3 | OUTPATIENT
Start: 2025-04-28

## 2025-04-30 ENCOUNTER — OFFICE VISIT (OUTPATIENT)
Dept: FAMILY MEDICINE CLINIC | Age: 65
End: 2025-04-30
Payer: COMMERCIAL

## 2025-04-30 VITALS
TEMPERATURE: 97.1 F | BODY MASS INDEX: 27.78 KG/M2 | WEIGHT: 204.8 LBS | SYSTOLIC BLOOD PRESSURE: 106 MMHG | DIASTOLIC BLOOD PRESSURE: 64 MMHG

## 2025-04-30 DIAGNOSIS — E11.22 CKD STAGE 1 DUE TO TYPE 2 DIABETES MELLITUS (HCC): ICD-10-CM

## 2025-04-30 DIAGNOSIS — E11.69 TYPE 2 DIABETES MELLITUS WITH HYPERLIPIDEMIA (HCC): ICD-10-CM

## 2025-04-30 DIAGNOSIS — E87.5 HYPERKALEMIA: ICD-10-CM

## 2025-04-30 DIAGNOSIS — Z71.9 HEALTH EDUCATION/COUNSELING: ICD-10-CM

## 2025-04-30 DIAGNOSIS — E78.5 TYPE 2 DIABETES MELLITUS WITH HYPERLIPIDEMIA (HCC): ICD-10-CM

## 2025-04-30 DIAGNOSIS — I25.10 CORONARY ARTERY DISEASE DUE TO TYPE 2 DIABETES MELLITUS (HCC): Primary | ICD-10-CM

## 2025-04-30 DIAGNOSIS — N18.1 CKD STAGE 1 DUE TO TYPE 2 DIABETES MELLITUS (HCC): ICD-10-CM

## 2025-04-30 DIAGNOSIS — E11.59 CORONARY ARTERY DISEASE DUE TO TYPE 2 DIABETES MELLITUS (HCC): Primary | ICD-10-CM

## 2025-04-30 DIAGNOSIS — Z76.89 ENCOUNTER FOR WEIGHT MANAGEMENT: ICD-10-CM

## 2025-04-30 DIAGNOSIS — E66.3 OVERWEIGHT WITH BODY MASS INDEX (BMI) 25.0-29.9: ICD-10-CM

## 2025-04-30 PROCEDURE — 3074F SYST BP LT 130 MM HG: CPT | Performed by: FAMILY MEDICINE

## 2025-04-30 PROCEDURE — 3051F HG A1C>EQUAL 7.0%<8.0%: CPT | Performed by: FAMILY MEDICINE

## 2025-04-30 PROCEDURE — 99214 OFFICE O/P EST MOD 30 MIN: CPT | Performed by: FAMILY MEDICINE

## 2025-04-30 PROCEDURE — 3078F DIAST BP <80 MM HG: CPT | Performed by: FAMILY MEDICINE

## 2025-04-30 RX ORDER — OLMESARTAN MEDOXOMIL 40 MG/1
40 TABLET ORAL NIGHTLY
Qty: 90 TABLET | Refills: 3 | Status: SHIPPED | OUTPATIENT
Start: 2025-04-30

## 2025-04-30 RX ORDER — DAPAGLIFLOZIN AND METFORMIN HYDROCHLORIDE 5; 1000 MG/1; MG/1
1000 TABLET, FILM COATED, EXTENDED RELEASE ORAL 2 TIMES DAILY
Qty: 180 TABLET | Refills: 3 | Status: SHIPPED | OUTPATIENT
Start: 2025-04-30

## 2025-04-30 RX ORDER — ROSUVASTATIN CALCIUM 40 MG/1
40 TABLET, COATED ORAL NIGHTLY
Qty: 90 TABLET | Refills: 3 | Status: SHIPPED | OUTPATIENT
Start: 2025-04-30

## 2025-04-30 ASSESSMENT — ENCOUNTER SYMPTOMS
SHORTNESS OF BREATH: 0
CHEST TIGHTNESS: 0
NAUSEA: 0
WHEEZING: 0
ABDOMINAL DISTENTION: 0
BLOOD IN STOOL: 0
VOMITING: 0
BACK PAIN: 0
DIARRHEA: 0
RHINORRHEA: 0
ABDOMINAL PAIN: 0
TROUBLE SWALLOWING: 0
SINUS PRESSURE: 0
CONSTIPATION: 0
COUGH: 0

## 2025-04-30 NOTE — PATIENT INSTRUCTIONS
Please contact me right around the time of 4th injection to give me an update on how you're doing on your weight loss drug (Tirzepatide/Semaglutide, etc). Please also be aware to give the pharmacy time to fill the medication. If you're doing well on the current dose of your medication then I can increase you to the next dose. If you're having any side effects of some kind then let me know what those side effects are, how severe they are, and how frequently you experience those side effect(s). I may then elect to have you continue the current dose (or possibly lower the dose) depending on the severity and frequency of your side effects. The higher dose, the more effective the medication will be with controlling your sugar and promoting weight loss. I would recommend administering either the thigh or arm (alternating the injection site) rather than the abdomen to avoid having gastrointestinal (GI) side effects.

## 2025-05-07 DIAGNOSIS — E87.5 HYPERKALEMIA: ICD-10-CM

## 2025-05-07 DIAGNOSIS — E78.5 TYPE 2 DIABETES MELLITUS WITH HYPERLIPIDEMIA (HCC): ICD-10-CM

## 2025-05-07 DIAGNOSIS — E11.69 TYPE 2 DIABETES MELLITUS WITH HYPERLIPIDEMIA (HCC): ICD-10-CM

## 2025-05-07 DIAGNOSIS — N18.1 CKD STAGE 1 DUE TO TYPE 2 DIABETES MELLITUS (HCC): ICD-10-CM

## 2025-05-07 DIAGNOSIS — E11.22 CKD STAGE 1 DUE TO TYPE 2 DIABETES MELLITUS (HCC): ICD-10-CM

## 2025-05-07 LAB
CREAT UR-MCNC: 111 MG/DL (ref 39–259)
EST. AVERAGE GLUCOSE BLD GHB EST-MCNC: 145.6 MG/DL
HBA1C MFR BLD: 6.7 %
MICROALBUMIN UR DL<=1MG/L-MCNC: 5.38 MG/DL
MICROALBUMIN/CREAT UR: 48.5 MG/G (ref 0–30)

## 2025-05-08 LAB
ALBUMIN SERPL-MCNC: 4.5 G/DL (ref 3.4–5)
ALBUMIN/GLOB SERPL: 2.3 {RATIO} (ref 1.1–2.2)
ALP SERPL-CCNC: 56 U/L (ref 40–129)
ALT SERPL-CCNC: 21 U/L (ref 10–40)
ANION GAP SERPL CALCULATED.3IONS-SCNC: 12 MMOL/L (ref 3–16)
AST SERPL-CCNC: 22 U/L (ref 15–37)
BILIRUB SERPL-MCNC: 0.3 MG/DL (ref 0–1)
BUN SERPL-MCNC: 23 MG/DL (ref 7–20)
CALCIUM SERPL-MCNC: 9.7 MG/DL (ref 8.3–10.6)
CHLORIDE SERPL-SCNC: 103 MMOL/L (ref 99–110)
CO2 SERPL-SCNC: 26 MMOL/L (ref 21–32)
CREAT SERPL-MCNC: 1.1 MG/DL (ref 0.8–1.3)
GFR SERPLBLD CREATININE-BSD FMLA CKD-EPI: 75 ML/MIN/{1.73_M2}
GLUCOSE SERPL-MCNC: 86 MG/DL (ref 70–99)
MAGNESIUM SERPL-MCNC: 1.77 MG/DL (ref 1.8–2.4)
POTASSIUM SERPL-SCNC: 4.8 MMOL/L (ref 3.5–5.1)
PROT SERPL-MCNC: 6.5 G/DL (ref 6.4–8.2)
SODIUM SERPL-SCNC: 141 MMOL/L (ref 136–145)

## 2025-05-10 ENCOUNTER — RESULTS FOLLOW-UP (OUTPATIENT)
Dept: FAMILY MEDICINE CLINIC | Age: 65
End: 2025-05-10

## 2025-06-02 NOTE — PROGRESS NOTES
Kvng Garza   64 y.o. male   1960    HPI:    Patient was last seen by me on 4/30/2025.  During our last office visit, the main issue(s) that we focused on were:   -Type II DM + overweight - increased Ozempic from 0.5 mg to 1 mg qwk.    Reason(s) for visit:   Chief Complaint   Patient presents with    Discuss Labs     -Interval history-    [x] New health issue(s)/concern(s)/update(s):    Since starting Ozempic 1 Mg, pt has tried 1 month worth of it and no side effects. He has noticed that his appetite has been curbed, his glucose has gone down, he has cut back on his Xigduo XR from BID to QD.    Pt informed me that he hasn't taken Aspirin in over a year despite him knowing that he has a hx of CAD.    [] No new significant health event(s)/problem(s) occurred since our last office visit.    [] No new health issues/concerns discussed during today's office visit.    [x] Other noteworthy comment(s):     Pt plans to retire Aug 2026.    -Chronic health issue(s)-     CAD:  -S/P PCI of LAD on 8/23/2022.     Updates:  -He has been compliant with his Aspirin.  -Patient denied issues with frequent headache, chest pain, shortness of breath, palpitations, malaise, etc at rest or with exertion.     Type II diabetes mellitus:  -Last A1c =      Lab Results   Component Value Date    LABA1C 6.7 05/07/2025    LABA1C 7.4 02/05/2025    LABA1C 7.8 11/06/2024           Latest Ref Rng & Units 5/7/2025     8:11 AM 2/5/2025     8:42 AM 5/20/2024    11:02 AM 2/15/2024     9:16 AM 1/24/2024     9:37 AM   Labs Renal   BUN 7 - 20 mg/dL 23  19  22  18  21    Cr 0.8 - 1.3 mg/dL 1.1  1.0  1.0  1.0  0.9    K 3.5 - 5.1 mmol/L 4.8  5.7  5.4  5.1  5.0    Na 136 - 145 mmol/L 141  142  140  142  141        Lab Results   Component Value Date    ALBCREAT 48.5 (H) 05/07/2025    ALBCREAT 137.7 (H) 02/05/2025    ALBCREAT 101.9 (H) 07/29/2022       Lab Results   Component Value Date    CHOL 101 02/05/2025    TRIG 106 02/05/2025    HDL 29 (L) 02/05/2025

## 2025-06-04 ENCOUNTER — OFFICE VISIT (OUTPATIENT)
Dept: FAMILY MEDICINE CLINIC | Age: 65
End: 2025-06-04

## 2025-06-04 VITALS
DIASTOLIC BLOOD PRESSURE: 64 MMHG | WEIGHT: 201.8 LBS | HEIGHT: 72 IN | OXYGEN SATURATION: 97 % | TEMPERATURE: 96.8 F | BODY MASS INDEX: 27.33 KG/M2 | SYSTOLIC BLOOD PRESSURE: 122 MMHG | HEART RATE: 74 BPM

## 2025-06-04 DIAGNOSIS — N18.1 CKD STAGE 1 DUE TO TYPE 2 DIABETES MELLITUS (HCC): ICD-10-CM

## 2025-06-04 DIAGNOSIS — E16.2 HYPOGLYCEMIA: ICD-10-CM

## 2025-06-04 DIAGNOSIS — E11.22 CKD STAGE 1 DUE TO TYPE 2 DIABETES MELLITUS (HCC): ICD-10-CM

## 2025-06-04 DIAGNOSIS — E78.5 TYPE 2 DIABETES MELLITUS WITH HYPERLIPIDEMIA (HCC): ICD-10-CM

## 2025-06-04 DIAGNOSIS — I25.10 CORONARY ARTERY DISEASE DUE TO TYPE 2 DIABETES MELLITUS (HCC): Primary | ICD-10-CM

## 2025-06-04 DIAGNOSIS — E11.69 TYPE 2 DIABETES MELLITUS WITH HYPERLIPIDEMIA (HCC): ICD-10-CM

## 2025-06-04 DIAGNOSIS — E11.59 CORONARY ARTERY DISEASE DUE TO TYPE 2 DIABETES MELLITUS (HCC): Primary | ICD-10-CM

## 2025-06-04 RX ORDER — ASPIRIN 81 MG/1
81 TABLET ORAL NIGHTLY
Qty: 90 TABLET | Refills: 3 | Status: SHIPPED | OUTPATIENT
Start: 2025-06-04

## 2025-06-04 ASSESSMENT — ENCOUNTER SYMPTOMS
DIARRHEA: 0
ABDOMINAL PAIN: 0
ABDOMINAL DISTENTION: 0
WHEEZING: 0
RHINORRHEA: 0
SHORTNESS OF BREATH: 0
VOMITING: 0
CHEST TIGHTNESS: 0
TROUBLE SWALLOWING: 0
SINUS PRESSURE: 0
BACK PAIN: 0
BLOOD IN STOOL: 0
COUGH: 0
NAUSEA: 0
CONSTIPATION: 0

## 2025-07-21 DIAGNOSIS — E11.22 CKD STAGE 1 DUE TO TYPE 2 DIABETES MELLITUS (HCC): ICD-10-CM

## 2025-07-21 DIAGNOSIS — E66.3 OVERWEIGHT WITH BODY MASS INDEX (BMI) 25.0-29.9: ICD-10-CM

## 2025-07-21 DIAGNOSIS — E78.5 TYPE 2 DIABETES MELLITUS WITH HYPERLIPIDEMIA (HCC): ICD-10-CM

## 2025-07-21 DIAGNOSIS — E11.69 TYPE 2 DIABETES MELLITUS WITH HYPERLIPIDEMIA (HCC): ICD-10-CM

## 2025-07-21 DIAGNOSIS — I25.10 CORONARY ARTERY DISEASE DUE TO TYPE 2 DIABETES MELLITUS (HCC): ICD-10-CM

## 2025-07-21 DIAGNOSIS — Z76.89 ENCOUNTER FOR WEIGHT MANAGEMENT: ICD-10-CM

## 2025-07-21 DIAGNOSIS — N18.1 CKD STAGE 1 DUE TO TYPE 2 DIABETES MELLITUS (HCC): ICD-10-CM

## 2025-07-21 DIAGNOSIS — E11.59 CORONARY ARTERY DISEASE DUE TO TYPE 2 DIABETES MELLITUS (HCC): ICD-10-CM

## 2025-07-21 RX ORDER — SEMAGLUTIDE 1.34 MG/ML
INJECTION, SOLUTION SUBCUTANEOUS
Qty: 9 ML | Refills: 0 | Status: SHIPPED | OUTPATIENT
Start: 2025-07-21

## 2025-07-24 DIAGNOSIS — I25.10 CORONARY ARTERY DISEASE DUE TO TYPE 2 DIABETES MELLITUS (HCC): ICD-10-CM

## 2025-07-24 DIAGNOSIS — E11.59 CORONARY ARTERY DISEASE DUE TO TYPE 2 DIABETES MELLITUS (HCC): ICD-10-CM

## 2025-07-24 DIAGNOSIS — E78.5 TYPE 2 DIABETES MELLITUS WITH HYPERLIPIDEMIA (HCC): ICD-10-CM

## 2025-07-24 DIAGNOSIS — E11.69 TYPE 2 DIABETES MELLITUS WITH HYPERLIPIDEMIA (HCC): ICD-10-CM

## 2025-07-24 DIAGNOSIS — E11.22 CKD STAGE 1 DUE TO TYPE 2 DIABETES MELLITUS (HCC): ICD-10-CM

## 2025-07-24 DIAGNOSIS — N18.1 CKD STAGE 1 DUE TO TYPE 2 DIABETES MELLITUS (HCC): ICD-10-CM

## 2025-07-24 RX ORDER — DAPAGLIFLOZIN AND METFORMIN HYDROCHLORIDE 5; 1000 MG/1; MG/1
TABLET, FILM COATED, EXTENDED RELEASE ORAL
Qty: 180 TABLET | Refills: 3 | OUTPATIENT
Start: 2025-07-24

## 2025-08-18 ENCOUNTER — TELEPHONE (OUTPATIENT)
Dept: CARDIOLOGY CLINIC | Age: 65
End: 2025-08-18

## 2025-08-18 ENCOUNTER — PATIENT MESSAGE (OUTPATIENT)
Dept: FAMILY MEDICINE CLINIC | Age: 65
End: 2025-08-18

## 2025-08-19 DIAGNOSIS — R06.02 SHORTNESS OF BREATH: Primary | ICD-10-CM

## 2025-08-22 DIAGNOSIS — I25.10 CORONARY ARTERY DISEASE DUE TO TYPE 2 DIABETES MELLITUS (HCC): ICD-10-CM

## 2025-08-22 DIAGNOSIS — E11.22 CKD STAGE 1 DUE TO TYPE 2 DIABETES MELLITUS (HCC): ICD-10-CM

## 2025-08-22 DIAGNOSIS — E78.5 TYPE 2 DIABETES MELLITUS WITH HYPERLIPIDEMIA (HCC): ICD-10-CM

## 2025-08-22 DIAGNOSIS — E11.59 CORONARY ARTERY DISEASE DUE TO TYPE 2 DIABETES MELLITUS (HCC): ICD-10-CM

## 2025-08-22 DIAGNOSIS — N18.1 CKD STAGE 1 DUE TO TYPE 2 DIABETES MELLITUS (HCC): ICD-10-CM

## 2025-08-22 DIAGNOSIS — E11.69 TYPE 2 DIABETES MELLITUS WITH HYPERLIPIDEMIA (HCC): ICD-10-CM

## 2025-08-22 LAB
CREAT UR-MCNC: 146 MG/DL (ref 39–259)
MICROALBUMIN UR DL<=1MG/L-MCNC: 5.34 MG/DL
MICROALBUMIN/CREAT UR: 36.6 MG/G (ref 0–30)

## 2025-08-23 LAB
ALBUMIN SERPL-MCNC: 4.4 G/DL (ref 3.4–5)
ALBUMIN/GLOB SERPL: 2.3 {RATIO} (ref 1.1–2.2)
ALP SERPL-CCNC: 54 U/L (ref 40–129)
ALT SERPL-CCNC: 22 U/L (ref 10–40)
ANION GAP SERPL CALCULATED.3IONS-SCNC: 10 MMOL/L (ref 3–16)
AST SERPL-CCNC: 25 U/L (ref 15–37)
BILIRUB SERPL-MCNC: <0.2 MG/DL (ref 0–1)
BUN SERPL-MCNC: 17 MG/DL (ref 7–20)
CALCIUM SERPL-MCNC: 9.9 MG/DL (ref 8.3–10.6)
CHLORIDE SERPL-SCNC: 102 MMOL/L (ref 99–110)
CO2 SERPL-SCNC: 27 MMOL/L (ref 21–32)
CREAT SERPL-MCNC: 1.1 MG/DL (ref 0.8–1.3)
EST. AVERAGE GLUCOSE BLD GHB EST-MCNC: 131.2 MG/DL
GFR SERPLBLD CREATININE-BSD FMLA CKD-EPI: 74 ML/MIN/{1.73_M2}
GLUCOSE SERPL-MCNC: 68 MG/DL (ref 70–99)
HBA1C MFR BLD: 6.2 %
POTASSIUM SERPL-SCNC: 5.5 MMOL/L (ref 3.5–5.1)
PROT SERPL-MCNC: 6.3 G/DL (ref 6.4–8.2)
SODIUM SERPL-SCNC: 139 MMOL/L (ref 136–145)

## 2025-08-26 ENCOUNTER — HOSPITAL ENCOUNTER (OUTPATIENT)
Age: 65
Discharge: HOME | End: 2025-08-28
Attending: STUDENT IN AN ORGANIZED HEALTH CARE EDUCATION/TRAINING PROGRAM
Payer: COMMERCIAL

## 2025-08-26 VITALS — BODY MASS INDEX: 27.09 KG/M2 | HEIGHT: 72 IN | WEIGHT: 200 LBS

## 2025-08-26 DIAGNOSIS — R06.02 SHORTNESS OF BREATH: ICD-10-CM

## 2025-08-26 LAB
ECHO BSA: 2.15 M2
NUC STRESS EJECTION FRACTION: 46 %
NUC STRESS LV EDV: 170 ML (ref 67–155)
NUC STRESS LV ESV: 92 ML (ref 22–58)
NUC STRESS LV MASS: 176 G
STRESS BASELINE DIAS BP: 88 MMHG
STRESS BASELINE HR: 60 BPM
STRESS BASELINE SYS BP: 149 MMHG
STRESS ESTIMATED WORKLOAD: 10.1 METS
STRESS EXERCISE DUR MIN: 7 MIN
STRESS EXERCISE DUR SEC: 25 SEC
STRESS PEAK DIAS BP: 58 MMHG
STRESS PEAK SYS BP: 181 MMHG
STRESS PERCENT HR ACHIEVED: 97 %
STRESS POST PEAK HR: 150 BPM
STRESS RATE PRESSURE PRODUCT: ABNORMAL BPM*MMHG
STRESS TARGET HR: 155 BPM
TID: 1

## 2025-08-26 PROCEDURE — 93017 CV STRESS TEST TRACING ONLY: CPT

## 2025-08-26 PROCEDURE — 78452 HT MUSCLE IMAGE SPECT MULT: CPT | Performed by: INTERNAL MEDICINE

## 2025-08-26 PROCEDURE — 93016 CV STRESS TEST SUPVJ ONLY: CPT | Performed by: INTERNAL MEDICINE

## 2025-08-26 PROCEDURE — A9502 TC99M TETROFOSMIN: HCPCS | Performed by: STUDENT IN AN ORGANIZED HEALTH CARE EDUCATION/TRAINING PROGRAM

## 2025-08-26 PROCEDURE — 3430000000 HC RX DIAGNOSTIC RADIOPHARMACEUTICAL: Performed by: STUDENT IN AN ORGANIZED HEALTH CARE EDUCATION/TRAINING PROGRAM

## 2025-08-26 PROCEDURE — 78452 HT MUSCLE IMAGE SPECT MULT: CPT

## 2025-08-26 PROCEDURE — 93018 CV STRESS TEST I&R ONLY: CPT | Performed by: INTERNAL MEDICINE

## 2025-08-26 RX ADMIN — TETROFOSMIN 10.3 MILLICURIE: 1.38 INJECTION, POWDER, LYOPHILIZED, FOR SOLUTION INTRAVENOUS at 08:53

## 2025-08-26 RX ADMIN — TETROFOSMIN 33.7 MILLICURIE: 1.38 INJECTION, POWDER, LYOPHILIZED, FOR SOLUTION INTRAVENOUS at 09:45

## 2025-08-27 ENCOUNTER — OFFICE VISIT (OUTPATIENT)
Dept: FAMILY MEDICINE CLINIC | Age: 65
End: 2025-08-27
Payer: COMMERCIAL

## 2025-08-27 VITALS
DIASTOLIC BLOOD PRESSURE: 74 MMHG | WEIGHT: 198.4 LBS | SYSTOLIC BLOOD PRESSURE: 138 MMHG | TEMPERATURE: 96.7 F | BODY MASS INDEX: 26.91 KG/M2

## 2025-08-27 DIAGNOSIS — E11.69 TYPE 2 DIABETES MELLITUS WITH HYPERLIPIDEMIA (HCC): ICD-10-CM

## 2025-08-27 DIAGNOSIS — E78.5 TYPE 2 DIABETES MELLITUS WITH HYPERLIPIDEMIA (HCC): ICD-10-CM

## 2025-08-27 DIAGNOSIS — I25.10 CORONARY ARTERY DISEASE DUE TO TYPE 2 DIABETES MELLITUS (HCC): Primary | ICD-10-CM

## 2025-08-27 DIAGNOSIS — E11.22 CKD STAGE 1 DUE TO TYPE 2 DIABETES MELLITUS (HCC): ICD-10-CM

## 2025-08-27 DIAGNOSIS — N18.1 CKD STAGE 1 DUE TO TYPE 2 DIABETES MELLITUS (HCC): ICD-10-CM

## 2025-08-27 DIAGNOSIS — E11.59 CORONARY ARTERY DISEASE DUE TO TYPE 2 DIABETES MELLITUS (HCC): Primary | ICD-10-CM

## 2025-08-27 PROCEDURE — 3078F DIAST BP <80 MM HG: CPT | Performed by: FAMILY MEDICINE

## 2025-08-27 PROCEDURE — 99213 OFFICE O/P EST LOW 20 MIN: CPT | Performed by: FAMILY MEDICINE

## 2025-08-27 PROCEDURE — 3044F HG A1C LEVEL LT 7.0%: CPT | Performed by: FAMILY MEDICINE

## 2025-08-27 PROCEDURE — 1123F ACP DISCUSS/DSCN MKR DOCD: CPT | Performed by: FAMILY MEDICINE

## 2025-08-27 PROCEDURE — 3075F SYST BP GE 130 - 139MM HG: CPT | Performed by: FAMILY MEDICINE

## 2025-08-27 ASSESSMENT — ENCOUNTER SYMPTOMS
VOMITING: 0
ABDOMINAL PAIN: 0
NAUSEA: 0
BLOOD IN STOOL: 0
SINUS PRESSURE: 0
ABDOMINAL DISTENTION: 0
COUGH: 0
TROUBLE SWALLOWING: 0
SHORTNESS OF BREATH: 0
WHEEZING: 0
CHEST TIGHTNESS: 0
RHINORRHEA: 0
CONSTIPATION: 0
DIARRHEA: 0
BACK PAIN: 0

## 2025-08-27 ASSESSMENT — PATIENT HEALTH QUESTIONNAIRE - PHQ9
1. LITTLE INTEREST OR PLEASURE IN DOING THINGS: NOT AT ALL
2. FEELING DOWN, DEPRESSED OR HOPELESS: NOT AT ALL
SUM OF ALL RESPONSES TO PHQ QUESTIONS 1-9: 0